# Patient Record
Sex: MALE | Race: WHITE | NOT HISPANIC OR LATINO | Employment: OTHER | URBAN - NONMETROPOLITAN AREA
[De-identification: names, ages, dates, MRNs, and addresses within clinical notes are randomized per-mention and may not be internally consistent; named-entity substitution may affect disease eponyms.]

---

## 2017-01-03 ENCOUNTER — OFFICE VISIT (OUTPATIENT)
Dept: CARDIAC SURGERY | Facility: CLINIC | Age: 67
End: 2017-01-03

## 2017-01-03 VITALS
SYSTOLIC BLOOD PRESSURE: 148 MMHG | WEIGHT: 234 LBS | OXYGEN SATURATION: 94 % | HEART RATE: 77 BPM | DIASTOLIC BLOOD PRESSURE: 64 MMHG | HEIGHT: 71 IN | TEMPERATURE: 97.1 F | BODY MASS INDEX: 32.76 KG/M2

## 2017-01-03 DIAGNOSIS — G62.9 NEUROPATHY: ICD-10-CM

## 2017-01-03 DIAGNOSIS — I73.9 PVD (PERIPHERAL VASCULAR DISEASE) (HCC): Primary | ICD-10-CM

## 2017-01-03 PROBLEM — I10 ESSENTIAL HYPERTENSION: Status: ACTIVE | Noted: 2017-01-03

## 2017-01-03 PROBLEM — F32.9 REACTIVE DEPRESSION: Status: ACTIVE | Noted: 2017-01-03

## 2017-01-03 PROBLEM — E78.2 MIXED HYPERLIPIDEMIA: Status: ACTIVE | Noted: 2017-01-03

## 2017-01-03 PROCEDURE — 99213 OFFICE O/P EST LOW 20 MIN: CPT | Performed by: NURSE PRACTITIONER

## 2017-01-03 RX ORDER — GABAPENTIN 100 MG/1
100 CAPSULE ORAL NIGHTLY
Qty: 30 CAPSULE | Refills: 0 | Status: SHIPPED | OUTPATIENT
Start: 2017-01-03 | End: 2017-01-30 | Stop reason: DRUGHIGH

## 2017-01-03 RX ORDER — ESCITALOPRAM OXALATE 20 MG/1
10 TABLET ORAL DAILY
COMMUNITY

## 2017-01-03 NOTE — LETTER
January 3, 2017     LAURA Bain  [address] [facility] [address]    Patient: Conrado Cazares   YOB: 1950   Date of Visit: 1/3/2017       Dear LAURA Juarez:    Conrado Cazares was in my office today. Below are the relevant portions of my assessment and plan of care.      Independent Review of Radiographic Studies:    01/03/17   ASHWIN:  RIGHT .82  triphasic.  LEFT .70  triphasic.  1. PVD (peripheral vascular disease)  Moderate stable disease.  Continue medical management.  Conrado Cazares is to continue beta blocker, antiplatelet, and statin therapy.  ACE/ARB Lisinopril   Pletal  FU 6 mths unless increase symptoms  If signs and symptoms of ischemia should occur including but not limited to pale/blue discoloration of limb, increasing pain with ambulation or at rest, or a non-healing wound. Patient is to notify [facility] center for immediate evaluation.    2. Neuropathy  Add low dose gabapentin.  30 days only  To notify us if effective.    Detailed discussion regarding risks, benefits, and treatment plan.  Patient understands, agrees, and wishes to proceed with plan.                 If you have questions, please do not hesitate to call me. I look forward to following Conrado along with you.         Sincerely,        LAURA Floyd        CC: No Recipients

## 2017-01-03 NOTE — PATIENT INSTRUCTIONS
Vascular Test:  ASHWIN:  Moderate disease with triphasic waveforms (normal)   Continue aspirin and Pletal  Lopressor & Zestril     For neuropathy:  Add Neurontin or gabapentin at bedtime.  Inform of effectiveness  FU 6 mths unless increase symptoms  If signs and symptoms of ischemia should occur including but not limited to pale/blue discoloration of limb, increasing pain with ambulation or at rest, or a non-healing wound. Patient is to notify [facility] center for immediate evaluation.

## 2017-01-03 NOTE — PROGRESS NOTES
Subjective   Patient ID: Conrado Cazares is a 66 y.o. male is here today for follow-up for PVD.  CC:  Denies claudication.  CO feet burning &  leg numbness with sensation intact Denies any non healing sores or color changes.   PCP:  Rae Deras  History of Present Illness  66yr man with HTN, dyslipidemia, PVD, GERD.  smokes Ecig.  mild difficulty walking, calves cramp at 89yds, hips lock up, rest few minutes and goes on, progressive over years.  burning feet.   smokes vapor cigarette.  no new problems.    Lower extremity Arterial physiologic report (THG):  ASHWIN:  RIGHT .66 tri/biphasic. LEFT .56 tri/biphasic.  12/21/2015 ASHWIN:  RIGHT .80 triphasic.  LEFT .88 triphasic.  6/27/2016 ASHWIN:  RIGHT .88 triphasic.  LEFT .79 triphasic.  01/03/17   ASHWIN:  RIGHT .82  triphasic.  LEFT .70  triphasic.    The following portions of the patient's history were reviewed and updated as appropriate: allergies, current medications, past family history, past medical history, past social history, past surgical history and problem list.    Current Outpatient Prescriptions:   •  amLODIPine (NORVASC) 10 MG tablet, Take 10 mg by mouth Daily., Disp: , Rfl:   •  aspirin 325 MG tablet, Take 325 mg by mouth Daily., Disp: , Rfl:   •  atorvastatin (LIPITOR) 20 MG tablet, Take 20 mg by mouth Every Night., Disp: , Rfl:   •  cilostazol (PLETAL) 100 MG tablet, TAKE ONE TABLET BY MOUTH TWICE DAILY, Disp: 60 tablet, Rfl: 5  •  escitalopram (LEXAPRO) 20 MG tablet, Take 20 mg by mouth Daily., Disp: , Rfl:   •  lisinopril (PRINIVIL,ZESTRIL) 20 MG tablet, Take 20 mg by mouth Daily., Disp: , Rfl:   •  metoprolol tartrate (LOPRESSOR) 25 MG tablet, Take 25 mg by mouth Daily., Disp: , Rfl:   •  omega-3 acid ethyl esters (LOVAZA) 1 G capsule, Take 2 g by mouth 2 (Two) Times a Day., Disp: , Rfl:   •  ranitidine (ZANTAC) 150 MG tablet, Take 150 mg by mouth Every Night., Disp: , Rfl:   •  gabapentin (NEURONTIN) 100 MG capsule, Take 1 capsule by  mouth Every Night., Disp: 30 capsule, Rfl: 0    Review of Systems   Constitution: Negative for fever, weakness and malaise/fatigue.   HENT: Negative for headaches, hoarse voice and nosebleeds.    Eyes: Negative for visual disturbance.   Respiratory: Negative for cough, hemoptysis and shortness of breath.    Hematologic/Lymphatic: Does not bruise/bleed easily.   Skin: Negative for color change and flushing.   Musculoskeletal: Positive for muscle weakness (hips and legs ) and myalgias. Negative for joint swelling.   Gastrointestinal: Negative for change in bowel habit, heartburn, hematemesis, melena and nausea.   Genitourinary: Negative for hematuria.   Neurological: Positive for numbness (feet ) and paresthesias (burning feet). Negative for brief paralysis, disturbances in coordination, light-headedness, loss of balance and sensory change.   Psychiatric/Behavioral: Positive for depression. Negative for altered mental status.        Objective   Physical Exam   Constitutional: He is oriented to person, place, and time. He appears well-nourished.   HENT:   Head: Normocephalic.   Eyes: Conjunctivae are normal. Pupils are equal, round, and reactive to light.   Neck: No JVD present. No tracheal deviation present.   Cardiovascular: Normal rate, regular rhythm, normal heart sounds and intact distal pulses.    Pulses:       Carotid pulses are 1+ on the right side with bruit, and 1+ on the left side.       Radial pulses are 2+ on the right side, and 2+ on the left side.        Dorsalis pedis pulses are 2+ on the right side, and 2+ on the left side.        Posterior tibial pulses are 2+ on the right side, and 2+ on the left side.   Triphasic waveforms    Pulmonary/Chest: Effort normal and breath sounds normal.   Musculoskeletal: He exhibits no edema or tenderness.   Neurological: He is alert and oriented to person, place, and time.   Skin: Skin is warm and dry. No erythema. No pallor.   LE:  W/d/p cap refil<3 sec no edema   Light pink  post toes  No paleness  Intact mobility and sensation      Nursing note and vitals reviewed.      Assessment/Plan   Independent Review of Radiographic Studies:    01/03/17   ASHWIN:  RIGHT .82  triphasic.  LEFT .70  triphasic.  1. PVD (peripheral vascular disease)  Moderate stable disease.  Continue medical management.  Conrado Cazares is to continue beta blocker, antiplatelet, and statin therapy.  ACE/ARB Lisinopril   Pletal  FU 6 mths unless increase symptoms  If signs and symptoms of ischemia should occur including but not limited to pale/blue discoloration of limb, increasing pain with ambulation or at rest, or a non-healing wound. Patient is to notify [facility] V[facility] center for immediate evaluation.    2. Neuropathy  Add low dose gabapentin.  30 days only  To notify us if effective.    Detailed discussion regarding risks, benefits, and treatment plan.  Patient understands, agrees, and wishes to proceed with plan.

## 2017-01-03 NOTE — MR AVS SNAPSHOT
Conrado Cazares   1/3/2017 1:20 PM   Office Visit    Dept Phone:  [phone]   Encounter #:  [id number]    Provider:  LAURA Qureshi   Department:  [facility] CARDIOTHORACIC AND VASCULAR SURGERY                Your Full Care Plan              Today's Medication Changes          These changes are accurate as of: 1/3/17  2:02 PM.  If you have any questions, ask your nurse or doctor.               New Medication(s)Ordered:     gabapentin 100 MG capsule   Commonly known as:  NEURONTIN   Take 1 capsule by mouth Every Night.   Started by:  LAURA Qureshi         Medication(s)that have changed:     escitalopram 20 MG tablet   Commonly known as:  LEXAPRO   Take 20 mg by mouth Daily.   What changed:  Another medication with the same name was removed. Continue taking this medication, and follow the directions you see here.   Changed by:  LAURA Qureshi            Where to Get Your Medications      These medications were sent to [address] [facility] [phone] [facility] [phone] [place] [address] [place] [address]     Phone:  [phone]     gabapentin 100 MG capsule                  Your Updated Medication List          This list is accurate as of: 1/3/17  2:02 PM.  Always use your most recent med list.                amLODIPine 10 MG tablet   Commonly known as:  NORVASC       aspirin 325 MG tablet       atorvastatin 20 MG tablet   Commonly known as:  LIPITOR       cilostazol 100 MG tablet   Commonly known as:  PLETAL   TAKE ONE TABLET BY MOUTH TWICE DAILY       escitalopram 20 MG tablet   Commonly known as:  LEXAPRO       gabapentin 100 MG capsule   Commonly known as:  NEURONTIN   Take 1 capsule by mouth Every Night.       lisinopril 20 MG tablet   Commonly known as:  PRINIVIL,ZESTRIL       LOVAZA 1 G capsule   Generic drug:  omega-3 acid ethyl esters       metoprolol tartrate 25 MG tablet   Commonly known as:   LOPRESSOR       raNITIdine 150 MG tablet   Commonly known as:  ZANTAC               We Performed the Following     Ankle Brachial Index       You Were Diagnosed With        Codes Comments    PVD (peripheral vascular disease)    -  Primary ICD-10-CM: I73.9  ICD-9-CM: 443.9     Neuropathy     ICD-10-CM: G62.9  ICD-9-CM: 355.9       Instructions    Vascular Test:  ASHWIN:  Moderate disease with triphasic waveforms (normal)   Continue aspirin and Pletal  Lopressor & Zestril     For neuropathy:  Add Neurontin or gabapentin at bedtime.  Inform of effectiveness  FU 6 mths unless increase symptoms  If signs and symptoms of ischemia should occur including but not limited to pale/blue discoloration of limb, increasing pain with ambulation or at rest, or a non-healing wound. Patient is to notify Heart and Vascular center for immediate evaluation.         Patient Instructions History      Upcoming Appointments     Visit Type Date Time Department    FOLLOW UP 1/3/2017  1:20 PM [facility]      [facility]     [facility] [employer] allows you to send messages to your doctor, view your test results, renew your prescriptions, schedule appointments, and more. To sign up, go to [employer] and click on the Sign Up Now link in the New User? box. Enter your [employer] Activation Code exactly as it appears below along with the last four digits of your Social Security Number and your Date of Birth () to complete the sign-up process. If you do not sign up before the expiration date, you must request a new code.    [employer] Activation Code: [email or web address]  Expires: 2017 12:59 PM    If you have questions, you can email [email or web address] or call [phone] to talk to our [employer] staff. Remember, [employer] is NOT to be used for urgent needs. For medical emergencies, dial 911.               Other Info from Your Visit           Allergies     No Known Allergies      Reason for Visit     Peripheral  "Vascular Disease 6 month f/u      Vital Signs     Blood Pressure Pulse Temperature Height Weight Oxygen Saturation    148/64 (BP Location: Left arm) 77 97.1 °F (36.2 °C) (Oral) 71\" (180.3 cm) 234 lb (106 kg) 94%    Body Mass Index Smoking Status                32.64 kg/m2 Current Every Day Smoker          Problems and Diagnoses Noted     High blood pressure    Mixed hyperlipidemia    Neuropathy    PVD (peripheral vascular disease)    Reactive depression        "

## 2017-01-13 DIAGNOSIS — I65.23 BILATERAL CAROTID ARTERY STENOSIS: Primary | ICD-10-CM

## 2017-01-30 RX ORDER — GABAPENTIN 100 MG
CAPSULE ORAL
Qty: 90 CAPSULE | Refills: 2 | Status: SHIPPED | OUTPATIENT
Start: 2017-01-30 | End: 2017-02-03 | Stop reason: SDUPTHER

## 2017-02-03 RX ORDER — GABAPENTIN 100 MG
CAPSULE ORAL
Qty: 90 CAPSULE | Refills: 2 | Status: SHIPPED | OUTPATIENT
Start: 2017-02-03 | End: 2017-05-04

## 2017-04-05 RX ORDER — CILOSTAZOL 100 MG/1
TABLET ORAL
Qty: 60 TABLET | Refills: 0 | Status: CANCELLED | OUTPATIENT
Start: 2017-04-05

## 2017-04-05 RX ORDER — CILOSTAZOL 100 MG/1
100 TABLET ORAL 2 TIMES DAILY
Qty: 60 TABLET | Refills: 5 | Status: SHIPPED | OUTPATIENT
Start: 2017-04-05 | End: 2017-11-01 | Stop reason: SDUPTHER

## 2017-05-11 RX ORDER — GABAPENTIN 100 MG/1
100 CAPSULE ORAL 2 TIMES DAILY
Qty: 90 CAPSULE | Refills: 2 | Status: SHIPPED | OUTPATIENT
Start: 2017-05-11 | End: 2018-05-11

## 2017-05-15 RX ORDER — GABAPENTIN 100 MG/1
CAPSULE ORAL
Qty: 90 CAPSULE | Refills: 0 | OUTPATIENT
Start: 2017-05-15

## 2017-07-05 DIAGNOSIS — I73.9 PVD (PERIPHERAL VASCULAR DISEASE) (HCC): Primary | ICD-10-CM

## 2017-07-06 ENCOUNTER — OFFICE VISIT (OUTPATIENT)
Dept: CARDIAC SURGERY | Facility: CLINIC | Age: 67
End: 2017-07-06

## 2017-07-06 VITALS
OXYGEN SATURATION: 98 % | HEART RATE: 55 BPM | WEIGHT: 231.2 LBS | HEIGHT: 71 IN | SYSTOLIC BLOOD PRESSURE: 140 MMHG | DIASTOLIC BLOOD PRESSURE: 65 MMHG | BODY MASS INDEX: 32.37 KG/M2 | TEMPERATURE: 96.9 F

## 2017-07-06 DIAGNOSIS — I73.9 PVD (PERIPHERAL VASCULAR DISEASE) (HCC): Primary | ICD-10-CM

## 2017-07-06 PROCEDURE — 99213 OFFICE O/P EST LOW 20 MIN: CPT | Performed by: NURSE PRACTITIONER

## 2017-07-06 RX ORDER — PRIMIDONE 250 MG/1
TABLET ORAL 2 TIMES DAILY
COMMUNITY
Start: 2017-07-01 | End: 2022-09-14

## 2017-07-06 RX ORDER — ROPINIROLE 1 MG/1
2 TABLET, FILM COATED ORAL NIGHTLY
COMMUNITY
Start: 2017-07-01 | End: 2022-09-14

## 2017-07-06 RX ORDER — CLONAZEPAM 1 MG/1
0.5 TABLET ORAL NIGHTLY PRN
Status: ON HOLD | COMMUNITY
Start: 2017-07-01 | End: 2020-02-04

## 2017-07-06 RX ORDER — PROPRANOLOL HYDROCHLORIDE 160 MG/1
160 CAPSULE, EXTENDED RELEASE ORAL DAILY
COMMUNITY
Start: 2017-07-01 | End: 2022-08-29

## 2017-07-06 RX ORDER — HYDROCHLOROTHIAZIDE 25 MG/1
TABLET ORAL
COMMUNITY
Start: 2017-07-03 | End: 2018-01-18 | Stop reason: ALTCHOICE

## 2017-07-06 RX ORDER — LISINOPRIL 5 MG/1
TABLET ORAL
COMMUNITY
Start: 2017-07-01 | End: 2018-01-18 | Stop reason: ALTCHOICE

## 2017-07-06 NOTE — PATIENT INSTRUCTIONS
Vascular Studies:  Stable Mild disease both legs with triphasic waveforms  RIGHT 0.86  Left 0.70  Medications that reduce vascular disease:  Aspirin Pletal Lipitor  Secondary medications:  Metoprolol and lisinopril  Gabapentin  If desire CVTS to manage will require every 90 day visit.  If desire neurologist, Dr Herrera may manage.   Repeat studies in 6 months unless symptoms occur  If signs and symptoms of ischemia should occur including but not limited to pale/blue discoloration of limb, increasing pain with ambulation or at rest, or a non-healing wound. Patient is to notify Heart and Vascular center for immediate evaluation.  Recommend vit D level be obtain

## 2017-07-08 LAB
BH CV LOWER ARTERIAL LEFT ABI RATIO: 0.7
BH CV LOWER ARTERIAL LEFT DORSALIS PEDIS SYS MAX: 102 MMHG
BH CV LOWER ARTERIAL LEFT POST TIBIAL SYS MAX: 103 MMHG
BH CV LOWER ARTERIAL RIGHT ABI RATIO: 0.86
BH CV LOWER ARTERIAL RIGHT DORSALIS PEDIS SYS MAX: 102 MMHG
BH CV LOWER ARTERIAL RIGHT POST TIBIAL SYS MAX: 127 MMHG
UPPER ARTERIAL LEFT ARM BRACHIAL SYS MAX: 145 MMHG
UPPER ARTERIAL RIGHT ARM BRACHIAL SYS MAX: 147 MMHG

## 2017-07-14 NOTE — PROGRESS NOTES
Subjective   Patient ID: Conrado Cazares is a 67 y.o. male is here today for follow-up for PVD.  CC:  Denies claudication.  CO feet burning &  leg numbness with sensation intact Denies any non healing sores or color changes.  CIFUENTES with 20 min ambulation.   PCP:  Rae Deras  History of Present Illness  67yr man with HTN, dyslipidemia, PVD, GERD.  smokes Ecig.  mild difficulty walking, calves cramp at 89yds, hips lock up, rest few minutes and goes on, progressive over years.  burning feet.   smokes vapor cigarette.  no new problems.    Lower extremity Arterial physiologic report (THG):  ASHWIN:  RIGHT .66 tri/biphasic. LEFT .56 tri/biphasic.  12/21/2015 ASHWIN:  RIGHT .80 triphasic.  LEFT .88 triphasic.  6/27/2016 ASHWIN:  RIGHT .88 triphasic.  LEFT .79 triphasic.  01/03/17   ASHWIN:  RIGHT .82  triphasic.  LEFT .70  Triphasic.  07/06/17  ASHWIN:  RIGHT .86  triphasic.  LEFT .70  Triphasic.    The following portions of the patient's history were reviewed and updated as appropriate: allergies, current medications, past family history, past medical history, past social history, past surgical history and problem list.    Current Outpatient Prescriptions:   •  amLODIPine (NORVASC) 10 MG tablet, Take 10 mg by mouth Daily., Disp: , Rfl:   •  aspirin 325 MG tablet, Take 325 mg by mouth Daily., Disp: , Rfl:   •  atorvastatin (LIPITOR) 20 MG tablet, Take 20 mg by mouth Every Night., Disp: , Rfl:   •  cilostazol (PLETAL) 100 MG tablet, Take 1 tablet by mouth 2 (Two) Times a Day., Disp: 60 tablet, Rfl: 5  •  clonazePAM (KlonoPIN) 1 MG tablet, , Disp: , Rfl:   •  escitalopram (LEXAPRO) 20 MG tablet, Take 20 mg by mouth Daily., Disp: , Rfl:   •  gabapentin (NEURONTIN) 100 MG capsule, Take 1 capsule by mouth 2 (Two) Times a Day. Take 1 capsule in am and 2 capsule at bedtime, Disp: 90 capsule, Rfl: 2  •  hydrochlorothiazide (HYDRODIURIL) 25 MG tablet, , Disp: , Rfl:   •  lisinopril (PRINIVIL,ZESTRIL) 5 MG tablet, , Disp: ,  Rfl:   •  metoprolol tartrate (LOPRESSOR) 25 MG tablet, Take 25 mg by mouth Daily., Disp: , Rfl:   •  omega-3 acid ethyl esters (LOVAZA) 1 G capsule, Take 2 g by mouth 2 (Two) Times a Day., Disp: , Rfl:   •  primidone (MYSOLINE) 250 MG tablet, , Disp: , Rfl:   •  propranolol LA (INDERAL LA) 120 MG 24 hr capsule, , Disp: , Rfl:   •  ranitidine (ZANTAC) 150 MG tablet, Take 150 mg by mouth Every Night., Disp: , Rfl:   •  rOPINIRole (REQUIP) 1 MG tablet, , Disp: , Rfl:     Review of Systems   Constitution: Negative for fever, weakness and malaise/fatigue.   HENT: Negative for headaches, hoarse voice and nosebleeds.    Eyes: Negative for visual disturbance.   Cardiovascular: Positive for dyspnea on exertion (after walking 20 minutes ).   Respiratory: Negative for cough, hemoptysis and shortness of breath.    Hematologic/Lymphatic: Does not bruise/bleed easily.   Skin: Negative for color change and flushing.   Musculoskeletal: Positive for muscle weakness (hips and legs ) and myalgias. Negative for joint swelling.   Gastrointestinal: Negative for change in bowel habit, heartburn, hematemesis, melena and nausea.   Genitourinary: Negative for hematuria.   Neurological: Positive for numbness (feet ) and paresthesias (burning feet). Negative for brief paralysis, disturbances in coordination, light-headedness, loss of balance and sensory change.   Psychiatric/Behavioral: Negative for altered mental status. Depression: with anger issues.         Objective   Physical Exam   Constitutional: He is oriented to person, place, and time. He appears well-nourished.   HENT:   Head: Normocephalic.   Eyes: Conjunctivae are normal. Pupils are equal, round, and reactive to light.   Neck: No JVD present. No tracheal deviation present.   Cardiovascular: Normal rate, regular rhythm, normal heart sounds and intact distal pulses.    Pulses:       Carotid pulses are 1+ on the right side with bruit, and 1+ on the left side.       Radial pulses are  2+ on the right side, and 2+ on the left side.        Dorsalis pedis pulses are 2+ on the right side, and 2+ on the left side.        Posterior tibial pulses are 2+ on the right side, and 2+ on the left side.   Triphasic waveforms    Pulmonary/Chest: Effort normal and breath sounds normal.   Musculoskeletal: He exhibits edema (dependent). He exhibits no tenderness.       Neurological Sensory Findings -  Unaltered sharp/dull right ankle/foot discrimination and unaltered sharp/dull left ankle/foot discrimination.    Vascular Status -  His exam exhibits right foot vasculature normal and right foot edema. His exam exhibits left foot edema. His exam exhibits left foot vasculature abnormal (mild to moderate reduction ).   Skin Integrity  -  His right foot skin is intact.     Conrado 's left foot skin is intact. .  Neurological: He is alert and oriented to person, place, and time.   Skin: Skin is warm and dry. No erythema. No pallor.   LE:  W/d/p cap refil<3 sec no edema  Light pink  post toes  No paleness Warm  Intact mobility and sensation      Nursing note and vitals reviewed.      Assessment/Plan   Independent Review of Radiographic Studies:    07/06/17  ASHWIN:  RIGHT .86  triphasic.  LEFT .70  Triphasic.  1. PVD (peripheral vascular disease)  Moderate stable disease.  Continue medical management.  Conrado Cazares is to continue beta blocker, antiplatelet, and statin therapy.  ACE/ARB Lisinopril   Pletal  FU 6 mths unless increase symptoms  If signs and symptoms of ischemia should occur including but not limited to pale/blue discoloration of limb, increasing pain with ambulation or at rest, or a non-healing wound. Patient is to notify [facility] V[facility] center for immediate evaluation.    2. Neuropathy  Add low dose gabapentin, is effective.  Now managed by neurologist, Dr Herrera.     Detailed discussion regarding risks, benefits, and treatment plan.  Patient understands, agrees, and wishes to proceed with plan.

## 2017-10-31 RX ORDER — CILOSTAZOL 100 MG/1
TABLET ORAL
Qty: 60 TABLET | Refills: 5 | Status: CANCELLED | OUTPATIENT
Start: 2017-10-31

## 2017-11-01 RX ORDER — CILOSTAZOL 100 MG/1
100 TABLET ORAL 2 TIMES DAILY
Qty: 60 TABLET | Refills: 5 | Status: SHIPPED | OUTPATIENT
Start: 2017-11-01 | End: 2018-05-04 | Stop reason: SDUPTHER

## 2018-01-18 ENCOUNTER — OFFICE VISIT (OUTPATIENT)
Dept: CARDIAC SURGERY | Facility: CLINIC | Age: 68
End: 2018-01-18

## 2018-01-18 VITALS
HEIGHT: 71 IN | HEART RATE: 55 BPM | WEIGHT: 231.4 LBS | OXYGEN SATURATION: 99 % | SYSTOLIC BLOOD PRESSURE: 140 MMHG | TEMPERATURE: 96.7 F | DIASTOLIC BLOOD PRESSURE: 74 MMHG | BODY MASS INDEX: 32.4 KG/M2

## 2018-01-18 DIAGNOSIS — I73.9 PVD (PERIPHERAL VASCULAR DISEASE) (HCC): Primary | ICD-10-CM

## 2018-01-18 PROCEDURE — 99213 OFFICE O/P EST LOW 20 MIN: CPT | Performed by: NURSE PRACTITIONER

## 2018-01-18 NOTE — PATIENT INSTRUCTIONS
Vascular Tests:  Triphasic or normal resting blood flow to both feet  Venous congestion and swelling of both feet.  Sheeba boots till Sunday or Monday  Then Rebecca hose  Start with Large and progress to medium  If swelling and weight increases notify Dr Deras or our office  Repeat vascular tests in 6 mths unless  If signs and symptoms of ischemia should occur including but not limited to pale/blue discoloration of limb, increasing pain with ambulation or at rest, or a non-healing wound. Patient is to notify [facility] and V[facility] center for immediate evaluation.  Recommend continue medical management with antiplatelet therapy, and statin therapy.  Secondary ACE/BB  Vitamin D has been shown to promote healthy lining of arteries.  Check level and replace as needed.   Recommend healthy life style:   Continue not smoking cigarettes. Walking total of 45 minutes per day in minimum of 15 minute intervals   Avoid  Going  barefoot.  Non perfumed cream for for dry skin  REBECCA Hose Large or medium   Elevate legs 4 x a day 10 minutes at a time Ankle bone higher than hip bone.  Do toe heel exercises with elevation

## 2018-01-18 NOTE — PROGRESS NOTES
Subjective   Patient ID: Conrado Cazares is a 67 y.o. male is here today for follow-up for PVD.  CC:  Denies claudication.  CO feet burning &  leg numbness with sensation intact Denies any non healing sores or color changes.  Having swelling of lower legs and feet.  Has had weight gain over holidays, did increase food intake.  PCP:  Taihr Johnson  [place]  History of Present Illness  67yr man with HTN, dyslipidemia, PVD, GERD.  smokes Ecig.  mild difficulty walking, calves cramp at 89yds, hips lock up, rest few minutes and goes on, progressive over years.  burning feet.   smokes vapor cigarette.  no new problems except leg swelling has increased.     Lower extremity Arterial physiologic report (THG):  ASHWIN:  RIGHT .66 tri/biphasic. LEFT .56 tri/biphasic.  12/21/2015 ASHWIN:  RIGHT .80 triphasic.  LEFT .88 triphasic.  6/27/2016 ASHWIN:  RIGHT .88 triphasic.  LEFT .79 triphasic.  01/03/17   ASHWIN:  RIGHT .82  triphasic.  LEFT .70  Triphasic.  07/06/17  ASHWIN:  RIGHT .86  triphasic.  LEFT .70  Triphasic.  01/18/18   ASHWIN:  RIGHT 1.01  Triphasic.  LEFT .88  Triphasic.      The following portions of the patient's history were reviewed and updated as appropriate: allergies, current medications, past family history, past medical history, past social history, past surgical history and problem list.  See HPI  Allergies   Allergen Reactions   • Dopamine Dizziness     Dropped BP            Current Outpatient Prescriptions:   •  amLODIPine (NORVASC) 10 MG tablet, Take 10 mg by mouth Daily., Disp: , Rfl:   •  aspirin 325 MG tablet, Take 325 mg by mouth Daily., Disp: , Rfl:   •  atorvastatin (LIPITOR) 20 MG tablet, Take 20 mg by mouth Every Night., Disp: , Rfl:   •  cilostazol (PLETAL) 100 MG tablet, Take 1 tablet by mouth 2 (Two) Times a Day., Disp: 60 tablet, Rfl: 5  •  clonazePAM (KlonoPIN) 1 MG tablet, , Disp: , Rfl:   •  escitalopram (LEXAPRO) 20 MG tablet, Take 20 mg by mouth Daily., Disp: , Rfl:   •  gabapentin  (NEURONTIN) 100 MG capsule, Take 1 capsule by mouth 2 (Two) Times a Day. Take 1 capsule in am and 2 capsule at bedtime, Disp: 90 capsule, Rfl: 2  •  omega-3 acid ethyl esters (LOVAZA) 1 G capsule, Take 2 g by mouth 2 (Two) Times a Day., Disp: , Rfl:   •  primidone (MYSOLINE) 250 MG tablet, Take  by mouth Every Night., Disp: , Rfl:   •  propranolol LA (INDERAL LA) 120 MG 24 hr capsule, 120 mg Daily., Disp: , Rfl:   •  ranitidine (ZANTAC) 150 MG tablet, Take 150 mg by mouth Every Night., Disp: , Rfl:   •  metoprolol tartrate (LOPRESSOR) 25 MG tablet, Take 25 mg by mouth Daily., Disp: , Rfl:   •  rOPINIRole (REQUIP) 1 MG tablet, Take 2 mg by mouth Every Night., Disp: , Rfl:     Review of Systems   Constitution: Positive for weight gain. Negative for fever, weakness and malaise/fatigue.   HENT: Negative for hoarse voice and nosebleeds.    Eyes: Negative for visual disturbance.   Cardiovascular: Negative for dyspnea on exertion.   Respiratory: Negative for cough, hemoptysis and shortness of breath.    Hematologic/Lymphatic: Does not bruise/bleed easily.   Skin: Negative for color change and flushing.   Musculoskeletal: Positive for muscle weakness and myalgias. Negative for joint swelling.   Gastrointestinal: Negative for change in bowel habit, heartburn, hematemesis, melena and nausea.   Genitourinary: Negative for hematuria.   Neurological: Positive for numbness (feet ) and paresthesias (burning feet). Negative for brief paralysis, disturbances in coordination, headaches, light-headedness, loss of balance and sensory change.   Psychiatric/Behavioral: Negative for altered mental status. Depression: with anger issues.    Allergic/Immunologic: Negative for hives.        Objective   Physical Exam   Constitutional: He is oriented to person, place, and time. He appears well-nourished.   HENT:   Head: Normocephalic.   Eyes: Conjunctivae are normal. Pupils are equal, round, and reactive to light.   Neck: No JVD present. No  tracheal deviation present.   Cardiovascular: Normal rate, regular rhythm, normal heart sounds and intact distal pulses.    Pulses:       Carotid pulses are 1+ on the right side with bruit, and 1+ on the left side.       Radial pulses are 2+ on the right side, and 2+ on the left side.        Dorsalis pedis pulses are 2+ on the right side, and 2+ on the left side.        Posterior tibial pulses are 2+ on the right side, and 2+ on the left side.   Triphasic waveforms    Pulmonary/Chest: Effort normal and breath sounds normal.   Abdominal: Soft. Bowel sounds are normal.   Musculoskeletal: He exhibits edema (dependent). He exhibits no tenderness.       Neurological Sensory Findings -  Unaltered sharp/dull right ankle/foot discrimination and unaltered sharp/dull left ankle/foot discrimination.    Vascular Status -  His exam exhibits right foot vasculature normal and right foot edema. His exam exhibits left foot edema. His exam exhibits left foot vasculature abnormal.   Skin Integrity  -  His right foot skin is intact.     Conrado 's left foot skin is intact. .  Neurological: He is alert and oriented to person, place, and time.   Skin: Skin is warm and dry. No erythema. No pallor.   LE:  W/d/p cap refil<3 sec no edema  Light pink anterior toes, mild cyanosis posterior toes   No paleness Warm  Intact mobility and sensation to moderate touch.  Does not feel tissue.      Psychiatric: His behavior is normal.   Nursing note and vitals reviewed.      Assessment/Plan   Independent Review of Radiographic Studies:    01/18/18   ASHWIN:  RIGHT 1.01  Triphasic.  LEFT .88  Triphasic.  1. PVD (peripheral vascular disease)  Stable mild  disease.  Continue medical management.  Conrado Cazares is to continue beta blocker, antiplatelet, and statin therapy.  ACE/ARB Lisinopril   Pletal  Increase walking  Not smoking  Consider reduction in vapor   FU 6 mths unless increase symptoms  If signs and symptoms of ischemia should occur including but  not limited to pale/blue discoloration of limb, increasing pain with ambulation or at rest, or a non-healing wound. Patient is to notify [facility] for immediate evaluation.    2.  Dependent Edema  Venous disease, weight gain, and norvasc therapy contributes.  Unna Boots till Sunday or Monday then REBECCA hose  Start with Large and progress to medium  If swelling and weight increases notify Dr Deras or our office  Elevate legs 4 x a day 10 minutes at a time Ankle bone higher than hip bone.  Do toe heel exercises with elevation      Detailed discussion regarding risks, benefits, and treatment plan.  Patient understands, agrees, and wishes to proceed with plan.

## 2018-05-07 RX ORDER — CILOSTAZOL 100 MG/1
TABLET ORAL
Qty: 60 TABLET | Refills: 5 | Status: SHIPPED | OUTPATIENT
Start: 2018-05-07 | End: 2022-05-13

## 2018-07-19 ENCOUNTER — OFFICE VISIT (OUTPATIENT)
Dept: CARDIAC SURGERY | Facility: CLINIC | Age: 68
End: 2018-07-19

## 2018-07-19 VITALS
TEMPERATURE: 98.5 F | WEIGHT: 231 LBS | DIASTOLIC BLOOD PRESSURE: 70 MMHG | OXYGEN SATURATION: 99 % | BODY MASS INDEX: 32.34 KG/M2 | HEART RATE: 53 BPM | SYSTOLIC BLOOD PRESSURE: 162 MMHG | HEIGHT: 71 IN

## 2018-07-19 DIAGNOSIS — I73.9 PVD (PERIPHERAL VASCULAR DISEASE) (HCC): Primary | ICD-10-CM

## 2018-07-19 PROCEDURE — 99213 OFFICE O/P EST LOW 20 MIN: CPT | Performed by: NURSE PRACTITIONER

## 2018-07-19 RX ORDER — LISINOPRIL 10 MG/1
20 TABLET ORAL DAILY
Status: ON HOLD | COMMUNITY
End: 2020-02-04

## 2018-07-19 RX ORDER — GABAPENTIN 100 MG/1
100 CAPSULE ORAL 3 TIMES DAILY
COMMUNITY

## 2018-07-19 NOTE — PATIENT INSTRUCTIONS
Vascular Tests:  Normal blood flow to R leg foot.  Minimal reduction to Left toes  Medications that reduce vascular disease:  Vit D, aspirin, Lipitor, pletal  Lisinopril and Inderal  Continue smoking reduction with vapor cigarettes.  All nicotine products can inflame arteries.  Edema  Elevate legs   Walk 3-4 times a day 10-15 minutes per day  Early morning exercise helps weight loss   Heart healthy diet  Low salt  Consistent carbohydrate   Repeat 6 mths unless  If signs and symptoms of ischemia should occur including but not limited to pale/blue discoloration of limb, increasing pain with ambulation or at rest, or a non-healing wound. Patient is to notify [facility] center for immediate evaluation.   Elevate legs 4 x a day 10 minutes at a time Ankle bone higher than hip bone.  Do toe heel exercises with elevation

## 2018-07-19 NOTE — PROGRESS NOTES
Subjective   Patient ID: Conrado Cazares is a 68 y.o. male is here today for follow-up for PVD.  CC:  Denies claudication.  CO feet burning &  leg numbness with sensation intact, mild improvement Denies any non healing sores or color changes.  Swelling of lower legs and feet.      PCP:  Tahir Deras  History of Present Illness  68yr man with HTN, dyslipidemia, PVD, GERD.  smokes Ecig.  mild difficulty walking, calves cramp at 89yds, hips lock up, rest few minutes and goes on, progressive over years.  burning feet.   smokes vapor cigarette.  Leg swelling has increased.  Waling without cane.   Thinks anxiety depression bettered controlled.      Lower extremity Arterial physiologic report (THG):  ASHWIN:  RIGHT .66 tri/biphasic. LEFT .56 tri/biphasic.  12/21/2015 ASHWIN:  RIGHT .80 triphasic.  LEFT .88 triphasic.  6/27/2016 ASHWIN:  RIGHT .88 triphasic.  LEFT .79 triphasic.  01/03/17   ASHWIN:  RIGHT .82  triphasic.  LEFT .70  Triphasic.  07/06/17  ASHWIN:  RIGHT .86  triphasic.  LEFT .70  Triphasic.  01/18/18   ASHWIN:  RIGHT 1.01  Triphasic.  LEFT .88  Triphasic.  07/19/18   ASHWIN:  RIGHT 0.96  Triphasic.  LEFT .79  Triphasic.      The following portions of the patient's history were reviewed and updated as appropriate: allergies, current medications, past family history, past medical history, past social history, past surgical history and problem list.  See HPI  Allergies   Allergen Reactions   • Dopamine Dizziness     Dropped BP            Current Outpatient Prescriptions:   •  aspirin 325 MG tablet, Take 325 mg by mouth Daily., Disp: , Rfl:   •  atorvastatin (LIPITOR) 20 MG tablet, Take 20 mg by mouth Every Night., Disp: , Rfl:   •  Cholecalciferol (VITAMIN D3) 1000 units capsule, Take  by mouth., Disp: , Rfl:   •  cilostazol (PLETAL) 100 MG tablet, TAKE ONE TABLET BY MOUTH TWICE DAILY, Disp: 60 tablet, Rfl: 5  •  clonazePAM (KlonoPIN) 1 MG tablet, , Disp: , Rfl:   •  escitalopram (LEXAPRO) 20 MG tablet, Take  20 mg by mouth Daily., Disp: , Rfl:   •  gabapentin (NEURONTIN) 100 MG capsule, Take 100 mg by mouth 3 (Three) Times a Day., Disp: , Rfl:   •  lisinopril (PRINIVIL,ZESTRIL) 10 MG tablet, Take 10 mg by mouth Daily., Disp: , Rfl:   •  omega-3 acid ethyl esters (LOVAZA) 1 G capsule, Take 2 g by mouth 2 (Two) Times a Day., Disp: , Rfl:   •  primidone (MYSOLINE) 250 MG tablet, Take  by mouth Every Night. 1/2 tab. AM 1 tab. PM, Disp: , Rfl:   •  propranolol LA (INDERAL LA) 120 MG 24 hr capsule, 120 mg Daily., Disp: , Rfl:   •  ranitidine (ZANTAC) 150 MG tablet, Take 150 mg by mouth Every Night., Disp: , Rfl:   •  rOPINIRole (REQUIP) 1 MG tablet, Take 2 mg by mouth Every Night., Disp: , Rfl:     Review of Systems   Constitution: Negative for fever, weakness, malaise/fatigue and weight gain.   HENT: Negative for hoarse voice and nosebleeds.    Eyes: Negative for visual disturbance.   Cardiovascular: Positive for leg swelling. Negative for chest pain, claudication, cyanosis and dyspnea on exertion.        LE:  N Open sores  N color changes  N coldness.           Y numbness or paresthesias     Respiratory: Negative for cough, hemoptysis and shortness of breath.    Hematologic/Lymphatic: Does not bruise/bleed easily.   Skin: Negative for color change and flushing.   Musculoskeletal: Positive for muscle weakness and myalgias. Negative for joint swelling.   Gastrointestinal: Negative for abdominal pain, anorexia, change in bowel habit, heartburn, hematemesis, melena and nausea.   Genitourinary: Negative for hematuria.   Neurological: Positive for numbness (feet ) and paresthesias (burning feet). Negative for brief paralysis, disturbances in coordination, headaches, light-headedness, loss of balance, sensory change and tremors.   Psychiatric/Behavioral: Negative for altered mental status. Depression: with anger issues.    Allergic/Immunologic: Negative for hives.        Objective   Physical Exam   Constitutional: He is oriented  to person, place, and time. He appears well-nourished.   Body mass index is 32.22 kg/m².     HENT:   Head: Normocephalic.   Mouth/Throat: Oropharynx is clear and moist.   Eyes: Pupils are equal, round, and reactive to light. Conjunctivae are normal.   Neck: Neck supple. No JVD present. No tracheal deviation present.   Cardiovascular: Normal rate, regular rhythm, normal heart sounds and intact distal pulses.    Pulses:       Carotid pulses are 1+ on the right side, and 1+ on the left side.       Radial pulses are 2+ on the right side, and 2+ on the left side.        Dorsalis pedis pulses are 2+ on the right side, and 2+ on the left side.        Posterior tibial pulses are 2+ on the right side, and 2+ on the left side.   Triphasic waveforms    Pulmonary/Chest: Effort normal and breath sounds normal. No respiratory distress.   Abdominal: Soft. Bowel sounds are normal. He exhibits no distension.   Musculoskeletal: He exhibits edema (dependent). He exhibits no tenderness.       Neurological Sensory Findings -  Altered sharp/dull right ankle/foot discrimination and altered sharp/dull left ankle/foot discrimination.  Vascular Status -  His right foot exhibits abnormal foot edema. His right foot exhibits normal foot vasculature . His left foot exhibits abnormal foot edema. His left foot exhibits normal foot vasculature .  Skin Integrity  -  His right foot skin is intact.His left foot skin is intact..  Neurological: He is alert and oriented to person, place, and time. No cranial nerve deficit.   Skin: Skin is warm and dry. Capillary refill takes less than 2 seconds. No erythema. No pallor.   LE:  W/d/p cap refil<3 sec no edema  Light pink anterior toes, mild cyanosis posterior toes   No paleness Warm  Intact mobility and sensation to moderate touch.  Does not feel light touch      Psychiatric: His behavior is normal.   Nursing note and vitals reviewed.    Vitals:    07/19/18 1043   BP: 162/70   Pulse: 53   Temp: 98.5 °F  (36.9 °C)   SpO2: 99%     1    07/19/18  1043   Weight: 105 kg (231 lb)         Assessment/Plan   Independent Review of Radiographic Studies:    07/19/18   ASHWIN:  RIGHT 0.96  Triphasic.  LEFT .79  Triphasic.  1. PVD (peripheral vascular disease)  Stable mild  disease.  Continue medical management.  Conrado Cazares is to continue beta blocker, antiplatelet, and statin therapy.  ACE/ARB Lisinopril   Pletal  Increase walking  Not smoking  Consider reduction in vapor  All nicotine products can inflame arteries.  I advised Conrado of the risks of continuing to use tobacco, and I provided him with tobacco cessation educational materials in the After Visit Summary.   During this visit, I spent 2 minutes counseling the patient regarding tobacco cessation.    FU 6 mths unless increase symptoms  If signs and symptoms of ischemia should occur including but not limited to pale/blue discoloration of limb, increasing pain with ambulation or at rest, or a non-healing wound. Patient is to notify [facility] center for immediate evaluation.    2.  Dependent Edema  Unna boots and hose ineffective he reports.   Seeing Dr Deras today regarding edema    Elevate legs 4 x a day 10 minutes at a time Ankle bone higher than hip bone.  Do toe heel exercises with elevation    3.  Obesity Class I  Patient's Body mass index is 32.22 kg/m². BMI is above normal parameters. Recommendations include: nutrition counseling   Heart healthy diet  Low salt  Consistent carbohydrate .        Detailed discussion regarding risks, benefits, and treatment plan.  Patient understands, agrees, and wishes to proceed with plan.

## 2019-01-24 ENCOUNTER — OFFICE VISIT (OUTPATIENT)
Dept: CARDIAC SURGERY | Facility: CLINIC | Age: 69
End: 2019-01-24

## 2019-01-24 VITALS
HEIGHT: 71 IN | HEART RATE: 54 BPM | SYSTOLIC BLOOD PRESSURE: 135 MMHG | TEMPERATURE: 97.4 F | DIASTOLIC BLOOD PRESSURE: 80 MMHG | WEIGHT: 237 LBS | OXYGEN SATURATION: 98 % | BODY MASS INDEX: 33.18 KG/M2

## 2019-01-24 DIAGNOSIS — I73.9 PVD (PERIPHERAL VASCULAR DISEASE) (HCC): Primary | ICD-10-CM

## 2019-01-24 DIAGNOSIS — E78.2 MIXED HYPERLIPIDEMIA: ICD-10-CM

## 2019-01-24 PROCEDURE — 99214 OFFICE O/P EST MOD 30 MIN: CPT | Performed by: NURSE PRACTITIONER

## 2019-01-24 RX ORDER — BUMETANIDE 2 MG/1
2 TABLET ORAL DAILY
COMMUNITY
End: 2021-03-19

## 2019-01-24 NOTE — PATIENT INSTRUCTIONS
The results of your vascular studies of your right leg shows mild disease with good  circulation, in the left leg shows moderatedisease with fair circulation.  Continue aspirin, atorvastatin, Vit D3, cilostezal, lisinopril, and pronanolol for vascular disease.  If signs and symptoms of ischemia should occur including but not limited to pale/blue discoloration of limb, increasing pain with ambulation or at rest, or a non-healing wound. Patient is to notify [facility] center for immediate evaluation.

## 2019-01-28 NOTE — PROGRESS NOTES
CVTS Office Progress Note     Subjective   Patient ID: Conrado Cazares is a 68 y.o. male is here today for follow-up PVD.     Chief Complaint:    Chief Complaint   Patient presents with   • Peripheral Vascular Disease     6 month follow up        PCP:  Yohana Saenz APRN  Cardiology:  Dr. Menon  Nephrology:  Dr. Acosta     History of Present Illness  Mr. Cazares is a pleasant 68 year old male with a history of PVD, HTN, HLD, Neuropathy, depression, Vitamin D deficiency, GERD, and Tobacco Depedendence Syndrome.  He currently uses vaporizer in place of cigarettes.  He established with [facility] for lifestyle limiting claudication in 2015.  Initial ASHWIN showed 0.66 tri/bi of the RIGHT, and 0.56 tri/bi of the LEFT.  Has since, imroved considerably with medical management of vascular disease with the addition of pletal.  He currently does not report walking distance limitations or claudication at this time.  He has carotid bruit on exam but states he has had carotid duplex in [place] with minimal stenosis.      Peripheral Vascular Studies  06/2014 ASHWIN: 0.66 tri/bi of the RIGHT, and 0.56 tri/bi of the LEFT  12/21/2015 ASHWIN:  RIGHT .80 triphasic.  LEFT .88 triphasic.  6/27/2016 ASHWIN:  RIGHT .88 triphasic.  LEFT .79 triphasic.  01/03/17   ASHWIN:  RIGHT .82  triphasic.  LEFT .70  Triphasic.  07/06/17  ASHWIN:  RIGHT .86  triphasic.  LEFT .70  Triphasic.  01/18/18   ASHWIN:  RIGHT 1.01  Triphasic.  LEFT .88  Triphasic.  07/19/18   ASHWIN:  RIGHT 0.96  Triphasic.  LEFT .79  Triphasic.  01/24/19 ASHWIN: RIGHT 1.08 Triphasic. LEFT 0.78 Biphasic from femoral to distal       The following portions of the patient's history were reviewed and updated as appropriate: allergies, current medications, past family history, past medical history, past social history, past surgical history and problem list.  Recent images independently reviewed.  Available laboratory values reviewed.      Past Medical History:   Diagnosis Date   • Dyslipidemia     • Essential (primary) hypertension    • Essential hypertension    • Hyperlipidemia    • Nicotine dependence      other tobacco product, uncomplicated - vapor cig      • Other atherosclerosis of native arteries of extremities, bilateral legs (CMS/HCC)     with intermittent claudication   • Tobacco dependence syndrome      History reviewed. No pertinent surgical history.  Family History   Problem Relation Age of Onset   • Heart disease Other      Social History     Tobacco Use   • Smoking status: Current Every Day Smoker     Types: Electronic Cigarette   • Smokeless tobacco: Never Used   Substance Use Topics   • Alcohol use: No   • Drug use: No       ALLERGIES:   Dopamine    MEDICATIONS:      Current Outpatient Medications:   •  aspirin 325 MG tablet, Take 325 mg by mouth Daily., Disp: , Rfl:   •  atorvastatin (LIPITOR) 20 MG tablet, Take 20 mg by mouth Every Night., Disp: , Rfl:   •  bumetanide (BUMEX) 2 MG tablet, Take 2 mg by mouth Daily., Disp: , Rfl:   •  Cholecalciferol (VITAMIN D3) 1000 units capsule, Take  by mouth., Disp: , Rfl:   •  cilostazol (PLETAL) 100 MG tablet, TAKE ONE TABLET BY MOUTH TWICE DAILY, Disp: 60 tablet, Rfl: 5  •  clonazePAM (KlonoPIN) 1 MG tablet, , Disp: , Rfl:   •  escitalopram (LEXAPRO) 20 MG tablet, Take 20 mg by mouth Daily., Disp: , Rfl:   •  gabapentin (NEURONTIN) 100 MG capsule, Take 100 mg by mouth 3 (Three) Times a Day., Disp: , Rfl:   •  lisinopril (PRINIVIL,ZESTRIL) 10 MG tablet, Take 10 mg by mouth Daily., Disp: , Rfl:   •  omega-3 acid ethyl esters (LOVAZA) 1 G capsule, Take 2 g by mouth 2 (Two) Times a Day., Disp: , Rfl:   •  primidone (MYSOLINE) 250 MG tablet, Take  by mouth Every Night. 1/2 tab. AM 1 tab. PM, Disp: , Rfl:   •  propranolol LA (INDERAL LA) 120 MG 24 hr capsule, 120 mg Daily., Disp: , Rfl:   •  ranitidine (ZANTAC) 150 MG tablet, Take 150 mg by mouth Every Night., Disp: , Rfl:   •  rOPINIRole (REQUIP) 1 MG tablet, Take 2 mg by mouth Every Night., Disp: ,  Rfl:     Review of Systems   Constitution: Negative for chills, decreased appetite, fever, weakness and weight loss.   HENT: Negative for congestion, nosebleeds and sore throat.    Eyes: Negative for blurred vision, visual disturbance and visual halos.   Cardiovascular: Positive for leg swelling. Negative for chest pain, claudication and dyspnea on exertion.   Respiratory: Negative for cough, shortness of breath, sputum production and wheezing.    Endocrine: Negative for cold intolerance and polyuria.   Hematologic/Lymphatic: Negative for bleeding problem. Does not bruise/bleed easily.   Skin: Positive for unusual hair distribution. Negative for flushing and nail changes.   Musculoskeletal: Positive for arthritis and joint pain. Negative for back pain.   Gastrointestinal: Negative for bloating, abdominal pain, hematemesis, melena, nausea and vomiting.   Genitourinary: Negative for flank pain and hematuria.   Neurological: Negative for brief paralysis, difficulty with concentration, focal weakness, light-headedness, loss of balance, numbness and paresthesias.        No amaurosis fugax, TIA, or CVA.     Psychiatric/Behavioral: Negative for altered mental status, depression, substance abuse and suicidal ideas.   Allergic/Immunologic: Negative for hives and persistent infections.     Vitals:    01/24/19 1402   BP: 135/80   Pulse: 54   Temp: 97.4 °F (36.3 °C)   SpO2: 98%      Objective      Body mass index is 33.05 kg/m².  Physical Exam   Constitutional: He is oriented to person, place, and time. He appears well-developed and well-nourished.   HENT:   Head: Normocephalic and atraumatic.   Mouth/Throat: Oropharynx is clear and moist.   Eyes: Conjunctivae and EOM are normal. Pupils are equal, round, and reactive to light.   Neck: Normal range of motion. Neck supple. No JVD present.   Cardiovascular: Normal rate, regular rhythm, normal heart sounds and intact distal pulses.   Pulses:       Carotid pulses are 1+ on the right  side with bruit, and 1+ on the left side.       Dorsalis pedis pulses are 1+ on the right side, and 1+ on the left side.        Posterior tibial pulses are 1+ on the right side, and 1+ on the left side.   Venous stasis   Pulmonary/Chest: Effort normal and breath sounds normal. No stridor. No respiratory distress.   Abdominal: Soft. Bowel sounds are normal.   Musculoskeletal: Normal range of motion. He exhibits no edema or tenderness.   Lymphadenopathy:     He has no cervical adenopathy.   Neurological: He is alert and oriented to person, place, and time. No cranial nerve deficit. Coordination normal.   Skin: Skin is warm and dry. Capillary refill takes less than 2 seconds.   Venous staining present   Psychiatric: He has a normal mood and affect. His behavior is normal. Judgment normal.   Nursing note and vitals reviewed.        Assessment & Plan     Independent Review of Studies  01/24/19 ASHWIN: RIGHT 1.08 Triphasic. LEFT 0.78 Biphasic from femoral to distal    Peripheral Vascular Disease  Results of todays ASHWIN shows moderate disease of the left leg and mild of the right.  Currently, patient does not complain of claudication, and can ambulate unhindered.  Would recommend repeat ASHWIN in 12 months for surveillance of PVD.  Medical management includes ACE/ARB, BB, STATIN, Anti-Platelet, and VIT D if known deficiency.  We have discussed the benefit of improving conditioning as a means to improve walking distance and claudication symptoms.  Lifestyle changes as below.    Venous Stasis  Would recommend compression stockings, weight reduction, and routine ambulation/exercise    Lifestyle Modifications  Recommended lifestyle modifications to reduce the progression of vascular disease include increasing moderate activity to 150 minutes per week, avoidance of smoking, optimizing blood pressure control, strict control of diabetes, and management of hyperlipidemia.  Choose a diet that emphasizes intake of vegetables, fruits, and  whole grains; includes low-fat dairy products, poultry, fish, legumes, nontropical vegetable oils, and nuts; and limits intake of sweets, sugar-sweetened beverages, and red meats.  Limit alcohol intake to one drink per day in females and 2 drinks per day in men.      Class 1 Obesity  We have discussed the benefits of weight loss as it relates to long term morbidity and disease prevention.  Interventions discussed included self-monitoring of caloric intake, increasing physical activity/exercise, goal setting, stimulus control, consultation with dietitian, and non-food rewards.          Detailed discussion regarding risks, benefits, and treatment plan. Images independently reviewed. Patient understands, agrees, and wishes to proceed with plan.

## 2019-08-01 ENCOUNTER — OFFICE VISIT (OUTPATIENT)
Dept: CARDIAC SURGERY | Facility: CLINIC | Age: 69
End: 2019-08-01

## 2019-08-01 VITALS
DIASTOLIC BLOOD PRESSURE: 82 MMHG | SYSTOLIC BLOOD PRESSURE: 168 MMHG | BODY MASS INDEX: 31.84 KG/M2 | TEMPERATURE: 98.1 F | HEART RATE: 50 BPM | OXYGEN SATURATION: 98 % | WEIGHT: 227.4 LBS | HEIGHT: 71 IN

## 2019-08-01 DIAGNOSIS — Z78.9 ELECTRONIC CIGARETTE USE: ICD-10-CM

## 2019-08-01 DIAGNOSIS — Z87.891 STOPPED SMOKING WITH GREATER THAN 40 PACK YEAR HISTORY: ICD-10-CM

## 2019-08-01 DIAGNOSIS — I25.10 ARTERIOSCLEROTIC CARDIOVASCULAR DISEASE (ASCVD): ICD-10-CM

## 2019-08-01 DIAGNOSIS — E78.2 MIXED HYPERLIPIDEMIA: ICD-10-CM

## 2019-08-01 DIAGNOSIS — I73.9 PVD (PERIPHERAL VASCULAR DISEASE) (HCC): Primary | ICD-10-CM

## 2019-08-01 DIAGNOSIS — R09.89 LEFT CAROTID BRUIT: ICD-10-CM

## 2019-08-01 DIAGNOSIS — Z12.2 ENCOUNTER FOR SCREENING FOR LUNG CANCER: ICD-10-CM

## 2019-08-01 PROCEDURE — 99214 OFFICE O/P EST MOD 30 MIN: CPT | Performed by: NURSE PRACTITIONER

## 2019-08-01 RX ORDER — NITROGLYCERIN 0.4 MG/1
0.4 TABLET SUBLINGUAL
COMMUNITY

## 2019-08-01 RX ORDER — CLONIDINE HYDROCHLORIDE 0.1 MG/1
0.1 TABLET ORAL
COMMUNITY
End: 2020-09-23

## 2019-08-01 RX ORDER — AMLODIPINE BESYLATE 5 MG/1
5 TABLET ORAL DAILY
COMMUNITY
End: 2021-03-19

## 2019-08-01 NOTE — PATIENT INSTRUCTIONS
The results of your vascular studies of your right leg shows mild disease with good  circulation, in the left leg shows milddisease with good circulation.  Follow up in one year for carotid ultrasound and repeat ASHWIN.  Establish with Dr. Browning.   Recommended lifestyle modifications to reduce the progression of vascular disease include increasing moderate activity to 150 minutes per week, avoidance of smoking, optimizing blood pressure control, strict control of diabetes, and management of hyperlipidemia.  Choose a diet that emphasizes intake of vegetables, fruits, and whole grains; includes low-fat dairy products, poultry, fish, legumes, nontropical vegetable oils, and nuts; and limits intake of sweets, sugar-sweetened beverages, and red meats.  Limit alcohol intake to one drink per day in females and 2 drinks per day in men.  Consider low dose ct scan for lung cancer screening.

## 2019-08-01 NOTE — PROGRESS NOTES
CVTS Office Progress Note     Subjective   Patient ID: Conrado Cazares is a 69 y.o. male is here today for follow-up PVD.     Chief Complaint:    Chief Complaint   Patient presents with   • Peripheral Vascular Disease     6 mo f/u       PCP:  Yohana Saenz APRN  Cardiology:  Dr. Menon  Nephrology:  Dr. Acosta     History of Present Illness  Mr. Cazares is a pleasant 68 year old male with a history of PVD, HTN, HLD, Neuropathy, depression, Vitamin D deficiency, GERD, and Tobacco Depedendence Syndrome, CAD with history of CABG, he denies recent PCI.  Recently underwent ischemic evaluation with stress test by cardiologist in [place] patient reports studies were negative.  He currently uses vaporizer in place of cigarettes.  He established with [facility] for lifestyle limiting claudication in 2015.  Initial ASHWIN showed 0.66 tri/bi of the RIGHT, and 0.56 tri/bi of the LEFT.  Has since, imroved considerably with medical management of vascular disease with the addition of pletal.  He currently does not report walking distance limitations or claudication at this time.  He has carotid bruit on exam but states he has had carotid duplex in [place] with minimal stenosis will plan follow up in one year.    [facility]    06/2014 ASHWIN: 0.66 tri/bi of the RIGHT, and 0.56 tri/bi of the LEFT  12/21/2015 ASHWIN:  RIGHT .80 triphasic.  LEFT .88 triphasic.  6/27/2016 ASHWIN:  RIGHT .88 triphasic.  LEFT .79 triphasic.  01/03/17   ASHWIN:  RIGHT .82  triphasic.  LEFT .70  Triphasic.  07/06/17  ASHWIN:  RIGHT .86  triphasic.  LEFT .70  Triphasic.  01/18/18   ASHWIN:  RIGHT 1.01  Triphasic.  LEFT .88  Triphasic.  07/19/18   ASHWIN:  RIGHT 0.96  Triphasic.  LEFT .79  Triphasic.  01/24/19 ASHWIN: RIGHT 1.08 Triphasic. LEFT 0.78 Biphasic from femoral to distal  8/1/2019 ASHWIN: Right 0.91 triphasic.  Left 0.80 triphasic.       The following portions of the patient's history were reviewed and updated as appropriate: allergies, current  medications, past family history, past medical history, past social history, past surgical history and problem list.  Recent images independently reviewed.  Available laboratory values reviewed.      Past Medical History:   Diagnosis Date   • Dyslipidemia    • Essential (primary) hypertension    • Essential hypertension    • Hyperlipidemia    • Nicotine dependence      other tobacco product, uncomplicated - vapor cig      • Other atherosclerosis of native arteries of extremities, bilateral legs (CMS/HCC)     with intermittent claudication   • Tobacco dependence syndrome      History reviewed. No pertinent surgical history.  Family History   Problem Relation Age of Onset   • Heart disease Other      Social History     Tobacco Use   • Smoking status: Current Every Day Smoker     Types: Electronic Cigarette   • Smokeless tobacco: Never Used   Substance Use Topics   • Alcohol use: No   • Drug use: No       ALLERGIES:   Dopamine    MEDICATIONS:      Current Outpatient Medications:   •  amLODIPine (NORVASC) 5 MG tablet, Take 5 mg by mouth Daily., Disp: , Rfl:   •  aspirin 325 MG tablet, Take 325 mg by mouth Daily., Disp: , Rfl:   •  atorvastatin (LIPITOR) 20 MG tablet, Take 20 mg by mouth Every Night., Disp: , Rfl:   •  bumetanide (BUMEX) 2 MG tablet, Take 2 mg by mouth Daily., Disp: , Rfl:   •  Cholecalciferol (VITAMIN D3) 1000 units capsule, Take  by mouth., Disp: , Rfl:   •  cilostazol (PLETAL) 100 MG tablet, TAKE ONE TABLET BY MOUTH TWICE DAILY, Disp: 60 tablet, Rfl: 5  •  clonazePAM (KlonoPIN) 1 MG tablet, , Disp: , Rfl:   •  CloNIDine (CATAPRES) 0.1 MG tablet, Take 0.1 mg by mouth every night at bedtime. Take 1 tablet by mouth nightly for bp over 150, Disp: , Rfl:   •  escitalopram (LEXAPRO) 20 MG tablet, Take 20 mg by mouth Daily., Disp: , Rfl:   •  gabapentin (NEURONTIN) 100 MG capsule, Take 100 mg by mouth 3 (Three) Times a Day., Disp: , Rfl:   •  lisinopril (PRINIVIL,ZESTRIL) 10 MG tablet, Take 20 mg by mouth  Daily., Disp: , Rfl:   •  nitroglycerin (NITROSTAT) 0.4 MG SL tablet, Place 0.4 mg under the tongue Every 5 (Five) Minutes As Needed for Chest Pain. Take no more than 3 doses in 15 minutes., Disp: , Rfl:   •  omega-3 acid ethyl esters (LOVAZA) 1 G capsule, Take 2 g by mouth 2 (Two) Times a Day., Disp: , Rfl:   •  primidone (MYSOLINE) 250 MG tablet, Take  by mouth Every Night. 1/2 tab. AM 1 tab. PM, Disp: , Rfl:   •  propranolol LA (INDERAL LA) 160 MG 24 hr capsule, 120 mg Daily., Disp: , Rfl:   •  ranitidine (ZANTAC) 150 MG tablet, Take 150 mg by mouth Every Night., Disp: , Rfl:   •  rOPINIRole (REQUIP) 1 MG tablet, Take 2 mg by mouth Every Night., Disp: , Rfl:     Review of Systems   Constitution: Negative for chills, decreased appetite, fever, weakness and weight loss.   HENT: Negative for congestion, nosebleeds and sore throat.    Eyes: Negative for blurred vision, visual disturbance and visual halos.   Cardiovascular: Positive for leg swelling. Negative for chest pain, claudication and dyspnea on exertion.   Respiratory: Negative for cough, shortness of breath, sputum production and wheezing.    Endocrine: Negative for cold intolerance and polyuria.   Hematologic/Lymphatic: Negative for bleeding problem. Does not bruise/bleed easily.   Skin: Positive for unusual hair distribution. Negative for flushing and nail changes.   Musculoskeletal: Positive for arthritis and joint pain. Negative for back pain.   Gastrointestinal: Negative for bloating, abdominal pain, hematemesis, melena, nausea and vomiting.   Genitourinary: Negative for flank pain and hematuria.   Neurological: Negative for brief paralysis, difficulty with concentration, focal weakness, light-headedness, loss of balance, numbness and paresthesias.        No amaurosis fugax, TIA, or CVA.     Psychiatric/Behavioral: Negative for altered mental status, depression, substance abuse and suicidal ideas.   Allergic/Immunologic: Negative for hives and persistent  infections.     Vitals:    08/01/19 1326   BP: 168/82   Pulse: 50   Temp: 98.1 °F (36.7 °C)   SpO2: 98%      Objective   Temp:  [98.1 °F (36.7 °C)] 98.1 °F (36.7 °C)  Heart Rate:  [50] 50  BP: (168)/(82) 168/82  Body mass index is 31.72 kg/m².  Physical Exam   Constitutional: He is oriented to person, place, and time. He appears well-developed and well-nourished.   HENT:   Head: Normocephalic and atraumatic.   Mouth/Throat: Oropharynx is clear and moist.   Eyes: Conjunctivae and EOM are normal. Pupils are equal, round, and reactive to light.   Neck: Normal range of motion. Neck supple. No JVD present.   Cardiovascular: Normal rate, regular rhythm, normal heart sounds and intact distal pulses.   Pulses:       Carotid pulses are 1+ on the right side with bruit, and 1+ on the left side.       Dorsalis pedis pulses are 1+ on the right side, and 1+ on the left side.        Posterior tibial pulses are 1+ on the right side, and 1+ on the left side.   Venous stasis   Pulmonary/Chest: Effort normal and breath sounds normal. No stridor. No respiratory distress.   Abdominal: Soft. Bowel sounds are normal.   Musculoskeletal: Normal range of motion. He exhibits no edema or tenderness.   Lymphadenopathy:     He has no cervical adenopathy.   Neurological: He is alert and oriented to person, place, and time. No cranial nerve deficit. Coordination normal.   Skin: Skin is warm and dry. Capillary refill takes less than 2 seconds.   Venous staining present   Psychiatric: He has a normal mood and affect. His behavior is normal. Judgment normal.   Nursing note and vitals reviewed.        Assessment & Plan     Independent Review of Studies  8/1/2019 ASHWIN: Right 0.91 triphasic.  Left 0.80 triphasic.    Peripheral Vascular Disease  Results of todays ASHWIN shows moderate disease of the left leg and mild of the right.  Currently, patient does not complain of claudication, and can ambulate unhindered.  Would recommend repeat ASHWIN in 12 months for  surveillance of PVD.  Medical management includes ACE/ARB, BB, STATIN, Anti-Platelet, and VIT D if known deficiency.  We have discussed the benefit of improving conditioning as a means to improve walking distance and claudication symptoms.  Lifestyle changes as below.    Right Carotid Bruit  Carotid Duplex at next visit    ASCVD  Patient reports history of CAD.  Patient had CABG in 2007 at [facility], will obtain records.  Patient previously established with cardiology in [place] and recently underwent stress test which patient reported was negative.  However he reports his cardiologist is retiring.  Patient does report recent history of exertional dyspnea, and chest tightness.  We reviewed the symptoms of acute coronary syndrome and patient was instructed to visit the emergency department if the symptoms do not resolve with rest.  Established with Dr. Browning for new cardiologist.    Lung Cancer Screening  >30PY smoking history.  Meets criteria for LDCT, patient understands and wishes to enroll in annual surveillance for lung cancer.      Asymptomatic bradycardia  Continue to monitor heart rate with administration of beta-blockers.  Follow-up with cardiology as necessary.    Lifestyle Modifications  Recommended lifestyle modifications to reduce the progression of vascular disease include increasing moderate activity to 150 minutes per week, avoidance of smoking, optimizing blood pressure control, strict control of diabetes, and management of hyperlipidemia.  Choose a diet that emphasizes intake of vegetables, fruits, and whole grains; includes low-fat dairy products, poultry, fish, legumes, nontropical vegetable oils, and nuts; and limits intake of sweets, sugar-sweetened beverages, and red meats.  Limit alcohol intake to one drink per day in females and 2 drinks per day in men.      Class 1 Obesity  We have discussed the benefits of weight loss as it relates to long term morbidity and disease prevention.   Interventions discussed included self-monitoring of caloric intake, increasing physical activity/exercise, goal setting, stimulus control, consultation with dietitian, and non-food rewards.      Tobacco Dependence Syndrome  Currently no longer smoking cigarettes.  Patient is using e-cigarette.  We have discussed that E cigarettes are not well studied in the long-term health benefits are unknown.  Advised cessation and the use of nicotine replacement therapy in the form of patches gum or lozenges        Detailed discussion regarding risks, benefits, and treatment plan. Images independently reviewed. Patient understands, agrees, and wishes to proceed with plan.

## 2019-08-06 ENCOUNTER — HOSPITAL ENCOUNTER (OUTPATIENT)
Dept: CT IMAGING | Facility: HOSPITAL | Age: 69
Discharge: HOME | End: 2019-08-06
Admitting: NURSE PRACTITIONER

## 2019-08-06 DIAGNOSIS — Z87.891 STOPPED SMOKING WITH GREATER THAN 40 PACK YEAR HISTORY: ICD-10-CM

## 2019-08-06 PROCEDURE — G0297 LDCT FOR LUNG CA SCREEN: HCPCS

## 2019-09-06 ENCOUNTER — OFFICE VISIT (OUTPATIENT)
Dept: CARDIOLOGY | Facility: CLINIC | Age: 69
End: 2019-09-06

## 2019-09-06 VITALS
OXYGEN SATURATION: 98 % | HEART RATE: 58 BPM | BODY MASS INDEX: 32.13 KG/M2 | SYSTOLIC BLOOD PRESSURE: 150 MMHG | DIASTOLIC BLOOD PRESSURE: 70 MMHG | HEIGHT: 71 IN | WEIGHT: 229.5 LBS

## 2019-09-06 DIAGNOSIS — Z87.891 FORMER SMOKER: ICD-10-CM

## 2019-09-06 DIAGNOSIS — E66.09 CLASS 1 OBESITY DUE TO EXCESS CALORIES WITHOUT SERIOUS COMORBIDITY WITH BODY MASS INDEX (BMI) OF 32.0 TO 32.9 IN ADULT: ICD-10-CM

## 2019-09-06 DIAGNOSIS — E78.2 MIXED HYPERLIPIDEMIA: ICD-10-CM

## 2019-09-06 DIAGNOSIS — Z78.9 ELECTRONIC CIGARETTE USE: ICD-10-CM

## 2019-09-06 DIAGNOSIS — I25.10 CORONARY ARTERY DISEASE INVOLVING NATIVE CORONARY ARTERY OF NATIVE HEART WITHOUT ANGINA PECTORIS: Primary | ICD-10-CM

## 2019-09-06 DIAGNOSIS — I25.10 ARTERIOSCLEROTIC CARDIOVASCULAR DISEASE (ASCVD): Primary | ICD-10-CM

## 2019-09-06 DIAGNOSIS — R06.09 DYSPNEA ON EXERTION: ICD-10-CM

## 2019-09-06 PROCEDURE — 99204 OFFICE O/P NEW MOD 45 MIN: CPT | Performed by: INTERNAL MEDICINE

## 2019-09-06 PROCEDURE — 93005 ELECTROCARDIOGRAM TRACING: CPT | Performed by: INTERNAL MEDICINE

## 2019-09-06 NOTE — PROGRESS NOTES
Cardiovascular Medicine      Vinay Browning M.D., Ph.D., [facility]           Thank you for asking me to see Conrado Cazares for ASCAD.    History of Present Illness  This is a 69 y.o. male with:    1. ASCAD with remote CABG, 2007  2. Risks: Obese, former smoker, PAD, HLD, HTN, CKD    Conrado Cazraes is a 69 y.o. male who presents for evaluation of atherosclerotic coronary artery disease. He does have known ASCAD. He had CABG in 2007. He did not have a MI. He was laving left arm pain and left shoulder pain. He was a . He attributed the pain to this. Nevertheless, he did see cardiology in 2007. He saw a cardiologist who ordered an ischemia evaluation. He started on a TST, but he tells me this was discontinued. He had a LHC and was told he had MV ASCAD. He tells me he had a LIMA and possibly a SVG to circumflex. Recent history: taking medications as instructed, no medication side effects noted, no TIA's, no chest pain on exertion, no dyspnea on exertion and no swelling of ankles. Patient's symptoms have been stable. Medication side effects include: none. He has very mild dyspnea and LE edema. He is pretty physically active. He likes to be outside. He presents today because his cardiologist is retiring. He has not had a recent TTE. He has had two stress tests since being seen in [place]. These were normal. He does have PAD. He does use Pletal. He does have CKD and does have proteinuria. This has been attributed to the cause of his LE edema. He is on Bumex with nephrology.     The following portions of the patient's history were reviewed and updated as appropriate: allergies, current medications, past family history, past medical history, past social history, past surgical history and problem list.      Review of Systems - Review of Systems   Cardiovascular: Positive for dyspnea on exertion and leg swelling. Negative for chest pain, claudication, cyanosis, irregular heartbeat, near-syncope, orthopnea,  palpitations, paroxysmal nocturnal dyspnea and syncope.   Respiratory: Positive for shortness of breath. Negative for cough, hemoptysis and sleep disturbances due to breathing.         All other systems were reviewed and were negative.    family history includes Heart disease in his other.     reports that he has been smoking electronic cigarette.  He has never used smokeless tobacco. He reports that he does not drink alcohol or use drugs.    Allergies   Allergen Reactions   • Dopamine Dizziness     Dropped BP          Current Outpatient Medications:   •  amLODIPine (NORVASC) 5 MG tablet, Take 5 mg by mouth Daily., Disp: , Rfl:   •  aspirin 325 MG tablet, Take 325 mg by mouth Daily., Disp: , Rfl:   •  atorvastatin (LIPITOR) 20 MG tablet, Take 20 mg by mouth Every Night., Disp: , Rfl:   •  bumetanide (BUMEX) 2 MG tablet, Take 2 mg by mouth Daily., Disp: , Rfl:   •  Cholecalciferol (VITAMIN D3) 1000 units capsule, Take  by mouth., Disp: , Rfl:   •  cilostazol (PLETAL) 100 MG tablet, TAKE ONE TABLET BY MOUTH TWICE DAILY, Disp: 60 tablet, Rfl: 5  •  clonazePAM (KlonoPIN) 1 MG tablet, , Disp: , Rfl:   •  CloNIDine (CATAPRES) 0.1 MG tablet, Take 0.1 mg by mouth every night at bedtime. Take 1 tablet by mouth nightly for bp over 150, Disp: , Rfl:   •  escitalopram (LEXAPRO) 20 MG tablet, Take 20 mg by mouth Daily., Disp: , Rfl:   •  gabapentin (NEURONTIN) 100 MG capsule, Take 100 mg by mouth 3 (Three) Times a Day., Disp: , Rfl:   •  lisinopril (PRINIVIL,ZESTRIL) 10 MG tablet, Take 20 mg by mouth Daily., Disp: , Rfl:   •  nitroglycerin (NITROSTAT) 0.4 MG SL tablet, Place 0.4 mg under the tongue Every 5 (Five) Minutes As Needed for Chest Pain. Take no more than 3 doses in 15 minutes., Disp: , Rfl:   •  omega-3 acid ethyl esters (LOVAZA) 1 G capsule, Take 2 g by mouth 2 (Two) Times a Day., Disp: , Rfl:   •  primidone (MYSOLINE) 250 MG tablet, Take  by mouth Every Night. 1/2 tab. AM 1 tab. PM, Disp: , Rfl:   •  propranolol LA  "(INDERAL LA) 160 MG 24 hr capsule, 120 mg Daily., Disp: , Rfl:   •  ranitidine (ZANTAC) 150 MG tablet, Take 150 mg by mouth Every Night., Disp: , Rfl:   •  rOPINIRole (REQUIP) 1 MG tablet, Take 2 mg by mouth Every Night., Disp: , Rfl:       Physical Exam:  Vitals:    09/06/19 0905   BP: 150/70   BP Location: Left arm   Patient Position: Sitting   Cuff Size: Adult   Pulse: 58   SpO2: 98%   Weight: 104 kg (229 lb 8 oz)   Height: 180.3 cm (71\")   PainSc: 0-No pain     Pulse Ox: Normal  on room air  General: alert, appears stated age and cooperative     Body Habitus: obese    HEENT: Head: Normocephalic, no lesions, without obvious abnormality. No arcus senilis, xanthelasma or xanthomas.    Neuro: alert, oriented x3  speech normal in context and clarity  memory intact grossly  Pulses: 2+ and symmetric  JVP: Volume/Pulsation: Normal.  Normal waveforms.   Appropriate inspiratory decrease.  No Kussmaul's. No Janis's.   Carotid Exam: no bruit normal pulsation bilaterally   Carotid Volume: normal.     Subclavian Bruit: absent  Vertebral Bruit: absent  Respirations: no increased work of breathing   Chest:  scar midline sternotomy    Pulmonary:Normal     Precordium: Normal impulses. P2 is not palpable.  RV Heave: absent  LV Heave: absent  [place]:  normal size and placement  Palpable S4: absent.  Heart rate: bradycardic    Heart Rhythm: regular     Heart Sounds: S1: normal intensity  S2: normal intensity  S3: absent   S4: absent  Opening Snap: absent    A2-OS:  no  Pericardial Rub:  Absent   Ejection click: None      Murmurs:  absent   Extremity: moves all extremities equally.       DATA REVIEWED:     EKG. I personally reviewed and interpreted the EKG.           Lab Results   Component Value Date    HGBA1C 6.7 (H) 07/15/2019     No results found for: DDIMER  No results found for: ALT  Lab Results   Component Value Date    HGBA1C 6.7 (H) 07/15/2019    HGBA1C 7.1 (H) 04/17/2019       Assessment/Plan     1. Arteriosclerotic " "cardiovascular disease (ASCVD). No anginal symptoms. The patient has been revascularized.  Revascularization:  CABG.   -Propranolol  -ASA--DOSE reduce 81mg  -Lipitor (atorvastatin)  -SL nitro prn  -Call 911 immediately for concerning symptoms.    2. Cardiac Risk Assessments:  -Continue follow-up visits with PCP for monitoring of labs; Dietary changes: Increase soluble fiber: A printed copy of a low fat, low cholesterol diet was given; Reduce saturated fat, \"trans\" monounsaturated fatty acids, and cholesterol; Exercise changes:  Encouraged daily aerobic activity.    -Essential HTN is a significant risk factor for stroke, heart disease and vascular disease. I've recommended the patient continue current medications, if any, as prescribed by the primary care provider. I recommended they have close follow-up for ongoing mgmt of this and the medical comorbidities associated with HTN with their PCP.    The patient's BMI is Body mass index is 32.01 kg/m²..  This places the patient in weight class:  Obese Class I: 30-34.9kg/m2.   -Weight loss hand-out  -Exercise intervention:   Hand out, increase aerobic activity  cut out extra servings, decrease soda or juice intake, eat breakfast, eat more fruits and vegetables, family to eat at dinner table more often, have 3 meals a day, increase physical activity, increase water intake, keep TV off during meals, plan meals, reduce fast food intake, reduce portion size and reduce screen time  -Close PCP follow-up for Body mass index is 32.01 kg/m²..       -Conrado Cazares is a current electronic cigarettes user.  He uses a bottle of liquid per month. This does contain nicotine.  I have educated him on the risk of diseases from using tobacco products such as cancer, COPD and heart diease.   I advised him to quit and he is not willing to quit. I spent 3  minutes.  -1-800-QUIT-NOW information was provided.     Screening:  -AAA screening recommended at 65.    3. Dyspnea and LE edema. No " overt elevated filling pressures on exam today. I would like to assess his LV EF.   -TTE    Return in about 1 month (around 10/6/2019).

## 2019-09-06 NOTE — PATIENT INSTRUCTIONS
Preventing Unhealthy Weight Gain, Adult  Staying at a healthy weight is important to your overall health. When fat builds up in your body, you may become overweight or obese. Being overweight or obese increases your risk of developing certain health problems, such as heart disease, diabetes, sleeping problems, joint problems, and some types of cancer.  Unhealthy weight gain is often the result of making unhealthy food choices or not getting enough exercise. You can make changes to your lifestyle to prevent obesity and stay as healthy as possible.  What nutrition changes can be made?    Eat only as much as your body needs. To do this:  Pay attention to signs that you are hungry or full. Stop eating as soon as you feel full.  If you feel hungry, try drinking water first before eating. Drink enough water so your urine is clear or pale yellow.  Eat smaller portions. Pay attention to portion sizes when eating out.  Look at serving sizes on food labels. Most foods contain more than one serving per container.  Eat the recommended number of calories for your gender and activity level. For most active people, a daily total of 2,000 calories is appropriate. If you are trying to lose weight or are not very active, you may need to eat fewer calories. Talk with your health care provider or a diet and nutrition specialist (dietitian) about how many calories you need each day.  Choose healthy foods, such as:  Fruits and vegetables. At each meal, try to fill at least half of your plate with fruits and vegetables.  Whole grains, such as whole-wheat bread, brown rice, and quinoa.  Lean meats, such as chicken or fish.  Other healthy proteins, such as beans, eggs, or tofu.  Healthy fats, such as nuts, seeds, fatty fish, and olive oil.  Low-fat or fat-free dairy products.  Check food labels, and avoid food and drinks that:  Are high in calories.  Have added sugar.  Are high in sodium.  Have saturated fats or trans fats.  Cook foods in  healthier ways, such as by baking, broiling, or grilling.  Make a meal plan for the week, and shop with a grocery list to help you stay on track with your purchases. Try to avoid going to the grocery store when you are hungry.  When grocery shopping, try to shop around the outside of the store first, where the fresh foods are. Doing this helps you to avoid prepackaged foods, which can be high in sugar, salt (sodium), and fat.  What lifestyle changes can be made?    Exercise for 30 or more minutes on 5 or more days each week. Exercising may include brisk walking, yard work, biking, running, swimming, and team sports like basketball and soccer. Ask your health care provider which exercises are safe for you.  Do muscle-strengthening activities, such as lifting weights or using resistance bands, on 2 or more days a week.  Do not use any products that contain nicotine or tobacco, such as cigarettes and e-cigarettes. If you need help quitting, ask your health care provider.  Limit alcohol intake to no more than 1 drink a day for nonpregnant women and 2 drinks a day for men. One drink equals 12 oz of beer, 5 oz of wine, or 1½ oz of hard liquor.  Try to get 7-9 hours of sleep each night.  What other changes can be made?  Keep a food and activity journal to keep track of:  What you ate and how many calories you had. Remember to count the calories in sauces, dressings, and side dishes.  Whether you were active, and what exercises you did.  Your calorie, weight, and activity goals.  Check your weight regularly. Track any changes. If you notice you have gained weight, make changes to your diet or activity routine.  Avoid taking weight-loss medicines or supplements. Talk to your health care provider before starting any new medicine or supplement.  Talk to your health care provider before trying any new diet or exercise plan.  Why are these changes important?  Eating healthy, staying active, and having healthy habits can help you  to prevent obesity. Those changes also:  Help you manage stress and emotions.  Help you connect with friends and family.  Improve your self-esteem.  Improve your sleep.  Prevent long-term health problems.  What can happen if changes are not made?  Being obese or overweight can cause you to develop joint or bone problems, which can make it hard for you to stay active or do activities you enjoy. Being obese or overweight also puts stress on your heart and lungs and can lead to health problems like diabetes, heart disease, and some cancers.  Where to find more information  Talk with your health care provider or a dietitian about healthy eating and healthy lifestyle choices. You may also find information from:  [place] Department of Agriculture, MyPlate: www.choosemyplate.gov  American Heart Association: www.heart.org  Centers for Disease Control and Prevention: www.cdc.gov  Summary  Staying at a healthy weight is important to your overall health. It helps you to prevent certain diseases and health problems, such as heart disease, diabetes, joint problems, sleep disorders, and some types of cancer.  Being obese or overweight can cause you to develop joint or bone problems, which can make it hard for you to stay active or do activities you enjoy.  You can prevent unhealthy weight gain by eating a healthy diet, exercising regularly, not smoking, limiting alcohol, and getting enough sleep.  Talk with your health care provider or a dietitian for guidance about healthy eating and healthy lifestyle choices.  This information is not intended to replace advice given to you by your health care provider. Make sure you discuss any questions you have with your health care provider.  Document Released: 12/19/2017 Document Revised: 09/28/2018 Document Reviewed: 01/24/2018  [employer] Interactive Patient Education © 2019 [employer]  Steps to Quit Smoking    Smoking tobacco can be harmful to your health and can affect almost every organ  in your body. Smoking puts you, and those around you, at risk for developing many serious chronic diseases. Quitting smoking is difficult, but it is one of the best things that you can do for your health. It is never too late to quit.  What are the benefits of quitting smoking?  When you quit smoking, you lower your risk of developing serious diseases and conditions, such as:  Lung cancer or lung disease, such as COPD.  Heart disease.  Stroke.  Heart attack.  Infertility.  Osteoporosis and bone fractures.  Additionally, symptoms such as coughing, wheezing, and shortness of breath may get better when you quit. You may also find that you get sick less often because your body is stronger at fighting off colds and infections. If you are pregnant, quitting smoking can help to reduce your chances of having a baby of low birth weight.  How do I get ready to quit?  When you decide to quit smoking, create a plan to make sure that you are successful. Before you quit:  Pick a date to quit. Set a date within the next two weeks to give you time to prepare.  Write down the reasons why you are quitting. Keep this list in places where you will see it often, such as on your bathroom mirror or in your car or wallet.  Identify the people, places, things, and activities that make you want to smoke (triggers) and avoid them. Make sure to take these actions:  Throw away all cigarettes at home, at work, and in your car.  Throw away smoking accessories, such as ashtrays and lighters.  Clean your car and make sure to empty the ashtray.  Clean your home, including curtains and carpets.  Tell your family, friends, and coworkers that you are quitting. Support from your loved ones can make quitting easier.  Talk with your health care provider about your options for quitting smoking.  Find out what treatment options are covered by your health insurance.  What strategies can I use to quit smoking?  Talk with your healthcare provider about  different strategies to quit smoking. Some strategies include:  Quitting smoking altogether instead of gradually lessening how much you smoke over a period of time. Research shows that quitting “cold turkey” is more successful than gradually quitting.  Attending in-person counseling to help you build problem-solving skills. You are more likely to have success in quitting if you attend several counseling sessions. Even short sessions of 10 minutes can be effective.  Finding resources and support systems that can help you to quit smoking and remain smoke-free after you quit. These resources are most helpful when you use them often. They can include:  Online chats with a counselor.  Telephone quitlines.  Printed self-help materials.  Support groups or group counseling.  Text messaging programs.  Mobile phone applications.  Taking medicines to help you quit smoking. (If you are pregnant or breastfeeding, talk with your health care provider first.) Some medicines contain nicotine and some do not. Both types of medicines help with cravings, but the medicines that include nicotine help to relieve withdrawal symptoms. Your health care provider may recommend:  Nicotine patches, gum, or lozenges.  Nicotine inhalers or sprays.  Non-nicotine medicine that is taken by mouth.  Talk with your health care provider about combining strategies, such as taking medicines while you are also receiving in-person counseling. Using these two strategies together makes you more likely to succeed in quitting than if you used either strategy on its own.  If you are pregnant or breastfeeding, talk with your health care provider about finding counseling or other support strategies to quit smoking. Do not take medicine to help you quit smoking unless told to do so by your health care provider.  What things can I do to make it easier to quit?  Quitting smoking might feel overwhelming at first, but there is a lot that you can do to make it easier.  Take these important actions:  Reach out to your family and friends and ask that they support and encourage you during this time. Call telephone quitlines, reach out to support groups, or work with a counselor for support.  Ask people who smoke to avoid smoking around you.  Avoid places that trigger you to smoke, such as bars, parties, or smoke-break areas at work.  Spend time around people who do not smoke.  Lessen stress in your life, because stress can be a smoking trigger for some people. To lessen stress, try:  Exercising regularly.  Deep-breathing exercises.  Yoga.  Meditating.  Performing a body scan. This involves closing your eyes, scanning your body from head to toe, and noticing which parts of your body are particularly tense. Purposefully relax the muscles in those areas.  Download or purchase mobile phone or tablet apps (applications) that can help you stick to your quit plan by providing reminders, tips, and encouragement. There are many free apps, such as QuitGuide from the CDC (Centers for Disease Control and Prevention). You can find other support for quitting smoking (smoking cessation) through smokefree.gov and other websites.  How will I feel when I quit smoking?  Within the first 24 hours of quitting smoking, you may start to feel some withdrawal symptoms. These symptoms are usually most noticeable 2-3 days after quitting, but they usually do not last beyond 2-3 weeks. Changes or symptoms that you might experience include:  Mood swings.  Restlessness, anxiety, or irritation.  Difficulty concentrating.  Dizziness.  Strong cravings for sugary foods in addition to nicotine.  Mild weight gain.  Constipation.  Nausea.  Coughing or a sore throat.  Changes in how your medicines work in your body.  A depressed mood.  Difficulty sleeping (insomnia).  After the first 2-3 weeks of quitting, you may start to notice more positive results, such as:  Improved sense of smell and taste.  Decreased coughing and  sore throat.  Slower heart rate.  Lower blood pressure.  Clearer skin.  The ability to breathe more easily.  Fewer sick days.  Quitting smoking is very challenging for most people. Do not get discouraged if you are not successful the first time. Some people need to make many attempts to quit before they achieve long-term success. Do your best to stick to your quit plan, and talk with your health care provider if you have any questions or concerns.  This information is not intended to replace advice given to you by your health care provider. Make sure you discuss any questions you have with your health care provider.  Document Released: 12/12/2002 Document Revised: 07/24/2018 Document Reviewed: 05/03/2016  [employer] Interactive Patient Education © 2019 [employer]  Echocardiogram  An echocardiogram is a procedure that uses painless sound waves (ultrasound) to produce an image of the heart. Images from an echocardiogram can provide important information about:  · Signs of coronary artery disease (CAD).  · Aneurysm detection. An aneurysm is a weak or damaged part of an artery wall that bulges out from the normal force of blood pumping through the body.  · Heart size and shape. Changes in the size or shape of the heart can be associated with certain conditions, including heart failure, aneurysm, and CAD.  · Heart muscle function.  · Heart valve function.  · Signs of a past heart attack.  · Fluid buildup around the heart.  · Thickening of the heart muscle.  · A tumor or infectious growth around the heart valves.  Tell a health care provider about:  · Any allergies you have.  · All medicines you are taking, including vitamins, herbs, eye drops, creams, and over-the-counter medicines.  · Any blood disorders you have.  · Any surgeries you have had.  · Any medical conditions you have.  · Whether you are pregnant or may be pregnant.  What are the risks?  Generally, this is a safe procedure. However, problems may occur,  including:  · Allergic reaction to dye (contrast) that may be used during the procedure.  What happens before the procedure?  No specific preparation is needed. You may eat and drink normally.  What happens during the procedure?    · An IV tube may be inserted into one of your veins.  · You may receive contrast through this tube. A contrast is an injection that improves the quality of the pictures from your heart.  · A gel will be applied to your chest.  · A wand-like tool (transducer) will be moved over your chest. The gel will help to transmit the sound waves from the transducer.  · The sound waves will harmlessly bounce off of your heart to allow the heart images to be captured in real-time motion. The images will be recorded on a computer.  The procedure may vary among health care providers and hospitals.  What happens after the procedure?  · You may return to your normal, everyday life, including diet, activities, and medicines, unless your health care provider tells you not to do that.  Summary  · An echocardiogram is a procedure that uses painless sound waves (ultrasound) to produce an image of the heart.  · Images from an echocardiogram can provide important information about the size and shape of your heart, heart muscle function, heart valve function, and fluid buildup around your heart.  · You do not need to do anything to prepare before this procedure. You may eat and drink normally.  · After the echocardiogram is completed, you may return to your normal, everyday life, unless your health care provider tells you not to do that.  This information is not intended to replace advice given to you by your health care provider. Make sure you discuss any questions you have with your health care provider.  Document Released: 12/15/2001 Document Revised: 01/20/2018 Document Reviewed: 01/20/2018  [employer] Interactive Patient Education © 2019 [employer]

## 2019-09-13 LAB
BH CV ECHO MEAS - ACS: 2 CM
BH CV ECHO MEAS - AO ISTHMUS: 2.8 CM
BH CV ECHO MEAS - AO MAX PG (FULL): 2.6 MMHG
BH CV ECHO MEAS - AO MAX PG: 5 MMHG
BH CV ECHO MEAS - AO MEAN PG (FULL): 2 MMHG
BH CV ECHO MEAS - AO MEAN PG: 3 MMHG
BH CV ECHO MEAS - AO ROOT AREA (BSA CORRECTED): 1.7
BH CV ECHO MEAS - AO ROOT AREA: 10.8 CM^2
BH CV ECHO MEAS - AO ROOT DIAM: 3.7 CM
BH CV ECHO MEAS - AO V2 MAX: 112 CM/SEC
BH CV ECHO MEAS - AO V2 MEAN: 75.1 CM/SEC
BH CV ECHO MEAS - AO V2 VTI: 29.3 CM
BH CV ECHO MEAS - ASC AORTA: 3.7 CM
BH CV ECHO MEAS - AVA(I,A): 2.6 CM^2
BH CV ECHO MEAS - AVA(I,D): 2.6 CM^2
BH CV ECHO MEAS - AVA(V,A): 2.6 CM^2
BH CV ECHO MEAS - AVA(V,D): 2.6 CM^2
BH CV ECHO MEAS - BSA(HAYCOCK): 2.3 M^2
BH CV ECHO MEAS - BSA: 2.2 M^2
BH CV ECHO MEAS - BZI_BMI: 31.9 KILOGRAMS/M^2
BH CV ECHO MEAS - BZI_METRIC_HEIGHT: 180.3 CM
BH CV ECHO MEAS - BZI_METRIC_WEIGHT: 103.9 KG
BH CV ECHO MEAS - EDV(CUBED): 105.2 ML
BH CV ECHO MEAS - EDV(MOD-SP2): 78.2 ML
BH CV ECHO MEAS - EDV(MOD-SP4): 94.7 ML
BH CV ECHO MEAS - EDV(TEICH): 103.4 ML
BH CV ECHO MEAS - EF(CUBED): 44.5 %
BH CV ECHO MEAS - EF(MOD-SP2): 51 %
BH CV ECHO MEAS - EF(MOD-SP4): 43.3 %
BH CV ECHO MEAS - EF(TEICH): 37 %
BH CV ECHO MEAS - EPSS: 0.5 CM
BH CV ECHO MEAS - ESV(CUBED): 58.4 ML
BH CV ECHO MEAS - ESV(MOD-SP2): 38.3 ML
BH CV ECHO MEAS - ESV(MOD-SP4): 53.7 ML
BH CV ECHO MEAS - ESV(TEICH): 65.1 ML
BH CV ECHO MEAS - FS: 17.8 %
BH CV ECHO MEAS - IVS/LVPW: 0.78
BH CV ECHO MEAS - IVSD: 1 CM
BH CV ECHO MEAS - LA DIMENSION: 4.2 CM
BH CV ECHO MEAS - LA/AO: 1.1
BH CV ECHO MEAS - LV DIASTOLIC VOL/BSA (35-75): 42.4 ML/M^2
BH CV ECHO MEAS - LV MASS(C)D: 204.6 GRAMS
BH CV ECHO MEAS - LV MASS(C)DI: 91.6 GRAMS/M^2
BH CV ECHO MEAS - LV MAX PG: 2.4 MMHG
BH CV ECHO MEAS - LV MEAN PG: 1 MMHG
BH CV ECHO MEAS - LV SYSTOLIC VOL/BSA (12-30): 24 ML/M^2
BH CV ECHO MEAS - LV V1 MAX: 77.6 CM/SEC
BH CV ECHO MEAS - LV V1 MEAN: 55.4 CM/SEC
BH CV ECHO MEAS - LV V1 VTI: 20.3 CM
BH CV ECHO MEAS - LVIDD: 4.7 CM
BH CV ECHO MEAS - LVIDS: 3.9 CM
BH CV ECHO MEAS - LVLD AP2: 7.6 CM
BH CV ECHO MEAS - LVLD AP4: 7.4 CM
BH CV ECHO MEAS - LVLS AP2: 6.1 CM
BH CV ECHO MEAS - LVLS AP4: 6.4 CM
BH CV ECHO MEAS - LVOT AREA (M): 3.8 CM^2
BH CV ECHO MEAS - LVOT AREA: 3.8 CM^2
BH CV ECHO MEAS - LVOT DIAM: 2.2 CM
BH CV ECHO MEAS - LVPWD: 1.3 CM
BH CV ECHO MEAS - MR MAX PG: 89.9 MMHG
BH CV ECHO MEAS - MR MAX VEL: 474 CM/SEC
BH CV ECHO MEAS - MV A MAX VEL: 93.1 CM/SEC
BH CV ECHO MEAS - MV DEC SLOPE: 570 CM/SEC^2
BH CV ECHO MEAS - MV E MAX VEL: 111 CM/SEC
BH CV ECHO MEAS - MV E/A: 1.2
BH CV ECHO MEAS - MV MAX PG: 5.3 MMHG
BH CV ECHO MEAS - MV MEAN PG: 1 MMHG
BH CV ECHO MEAS - MV P1/2T MAX VEL: 113 CM/SEC
BH CV ECHO MEAS - MV P1/2T: 58.1 MSEC
BH CV ECHO MEAS - MV V2 MAX: 115 CM/SEC
BH CV ECHO MEAS - MV V2 MEAN: 54.4 CM/SEC
BH CV ECHO MEAS - MV V2 VTI: 34.5 CM
BH CV ECHO MEAS - MVA P1/2T LCG: 1.9 CM^2
BH CV ECHO MEAS - MVA(P1/2T): 3.8 CM^2
BH CV ECHO MEAS - MVA(VTI): 2.2 CM^2
BH CV ECHO MEAS - PA MAX PG: 3.8 MMHG
BH CV ECHO MEAS - PA MEAN PG: 2 MMHG
BH CV ECHO MEAS - PA V2 MAX: 97 CM/SEC
BH CV ECHO MEAS - PA V2 MEAN: 60.1 CM/SEC
BH CV ECHO MEAS - PA V2 VTI: 21.5 CM
BH CV ECHO MEAS - PI END-D VEL: 168 CM/SEC
BH CV ECHO MEAS - RAP SYSTOLE: 3 MMHG
BH CV ECHO MEAS - RVSP: 45 MMHG
BH CV ECHO MEAS - SI(AO): 141 ML/M^2
BH CV ECHO MEAS - SI(CUBED): 20.9 ML/M^2
BH CV ECHO MEAS - SI(LVOT): 34.5 ML/M^2
BH CV ECHO MEAS - SI(MOD-SP2): 17.9 ML/M^2
BH CV ECHO MEAS - SI(MOD-SP4): 18.4 ML/M^2
BH CV ECHO MEAS - SI(TEICH): 17.1 ML/M^2
BH CV ECHO MEAS - SV(AO): 315 ML
BH CV ECHO MEAS - SV(CUBED): 46.7 ML
BH CV ECHO MEAS - SV(LVOT): 77.2 ML
BH CV ECHO MEAS - SV(MOD-SP2): 39.9 ML
BH CV ECHO MEAS - SV(MOD-SP4): 41 ML
BH CV ECHO MEAS - SV(TEICH): 38.3 ML
BH CV ECHO MEAS - TR MAX VEL: 280 CM/SEC

## 2019-10-04 ENCOUNTER — OFFICE VISIT (OUTPATIENT)
Dept: CARDIOLOGY | Facility: CLINIC | Age: 69
End: 2019-10-04

## 2019-10-04 VITALS
WEIGHT: 223.3 LBS | OXYGEN SATURATION: 99 % | HEART RATE: 56 BPM | SYSTOLIC BLOOD PRESSURE: 140 MMHG | BODY MASS INDEX: 31.26 KG/M2 | DIASTOLIC BLOOD PRESSURE: 72 MMHG | HEIGHT: 71 IN

## 2019-10-04 DIAGNOSIS — I36.1 NONRHEUMATIC TRICUSPID VALVE REGURGITATION: ICD-10-CM

## 2019-10-04 DIAGNOSIS — I37.1 NONRHEUMATIC PULMONARY VALVE INSUFFICIENCY: ICD-10-CM

## 2019-10-04 DIAGNOSIS — I34.0 NONRHEUMATIC MITRAL VALVE REGURGITATION: ICD-10-CM

## 2019-10-04 DIAGNOSIS — I27.20 PULMONARY HYPERTENSION (HCC): Primary | ICD-10-CM

## 2019-10-04 DIAGNOSIS — I31.39 PERICARDIAL EFFUSION: ICD-10-CM

## 2019-10-04 DIAGNOSIS — I25.10 CORONARY ARTERY DISEASE INVOLVING NATIVE CORONARY ARTERY OF NATIVE HEART WITHOUT ANGINA PECTORIS: ICD-10-CM

## 2019-10-04 PROCEDURE — 99214 OFFICE O/P EST MOD 30 MIN: CPT | Performed by: INTERNAL MEDICINE

## 2019-10-04 NOTE — PROGRESS NOTES
Cardiovascular Medicine      Vinay Browning M.D., Ph.D., [facility]             History of Present Illness  This is a 69 y.o. male with:    1. PAH  2. MR  3. TR  4. PI  5. Pericardial effusion  6. ASCAD with remote CABG, 2007  7. Risks: Obese, former smoker, PAD, HLD, HTN, CKD    Conrado Cazares is a 69 y.o. male who presents in follow-up.  I saw him to establish care for coronary artery disease.  Patient had a abnormal ischemia evaluation in the past after he complained of left arm pain and left shoulder pain.  Invasive coronary angiography in 2007 showed multivessel coronary artery disease.  He believes that he has a LIMA and possibly a saphenous vein graft to the circumflex.  When I last saw him he was prescribed aspirin, atorvastatin and propranolol.  He is had no resting, exertional or nocturnal angina.  He was complaining of significant exertional dyspnea and lower extremity edema.  He was sent for a 2D echocardiogram which showed pulmonary hypertension.  PA pressures were elevated.  The patient's right ventricle was dilated, but had normal RVEF.  He was found to have significant pulmonary insufficiency and tricuspid regurgitation.  He was also found to have mitral regurgitation.  He was also found to have trace pericardial effusion.  He is a heavy former smoker.  Unfortunately, he continues to vape a product with nicotine.  He does have CKD with proteinuria and this has been attributed as a cause of his lower extremity edema.  He remains on Bumex with nephrology.      The following portions of the patient's history were reviewed and updated as appropriate: allergies, current medications, past family history, past medical history, past social history, past surgical history and problem list.      Review of Systems - Review of Systems   Cardiovascular: Positive for dyspnea on exertion and leg swelling. Negative for chest pain, claudication, cyanosis, irregular heartbeat, near-syncope, orthopnea, palpitations,  paroxysmal nocturnal dyspnea and syncope.   Respiratory: Positive for shortness of breath. Negative for cough, hemoptysis and sleep disturbances due to breathing.         All other systems were reviewed and were negative.    family history includes Heart disease in an other family member.     reports that he has been smoking electronic cigarette.  He has never used smokeless tobacco. He reports that he does not drink alcohol or use drugs.    Allergies   Allergen Reactions   • Dopamine Dizziness     Dropped BP          Current Outpatient Medications:   •  amLODIPine (NORVASC) 5 MG tablet, Take 5 mg by mouth Daily., Disp: , Rfl:   •  aspirin 325 MG tablet, Take 325 mg by mouth Daily., Disp: , Rfl:   •  atorvastatin (LIPITOR) 20 MG tablet, Take 20 mg by mouth Every Night., Disp: , Rfl:   •  bumetanide (BUMEX) 2 MG tablet, Take 2 mg by mouth Daily., Disp: , Rfl:   •  Cholecalciferol (VITAMIN D3) 1000 units capsule, Take  by mouth., Disp: , Rfl:   •  cilostazol (PLETAL) 100 MG tablet, TAKE ONE TABLET BY MOUTH TWICE DAILY, Disp: 60 tablet, Rfl: 5  •  clonazePAM (KlonoPIN) 1 MG tablet, 0.5 mg At Night As Needed., Disp: , Rfl:   •  CloNIDine (CATAPRES) 0.1 MG tablet, Take 0.1 mg by mouth every night at bedtime. Take 1 tablet by mouth nightly for bp over 150, Disp: , Rfl:   •  escitalopram (LEXAPRO) 20 MG tablet, Take 10 mg by mouth Daily. 3 tablets by mouth daily, Disp: , Rfl:   •  gabapentin (NEURONTIN) 100 MG capsule, Take 100 mg by mouth. 1 am 2 pm, Disp: , Rfl:   •  lisinopril (PRINIVIL,ZESTRIL) 10 MG tablet, Take 20 mg by mouth Daily., Disp: , Rfl:   •  nitroglycerin (NITROSTAT) 0.4 MG SL tablet, Place 0.4 mg under the tongue Every 5 (Five) Minutes As Needed for Chest Pain. Take no more than 3 doses in 15 minutes., Disp: , Rfl:   •  omega-3 acid ethyl esters (LOVAZA) 1 G capsule, Take 2 g by mouth 2 (Two) Times a Day., Disp: , Rfl:   •  primidone (MYSOLINE) 250 MG tablet, Take  by mouth 2 (Two) Times a Day., Disp: ,  "Rfl:   •  propranolol LA (INDERAL LA) 160 MG 24 hr capsule, 160 mg Daily., Disp: , Rfl:   •  ranitidine (ZANTAC) 150 MG tablet, Take 150 mg by mouth Every Night., Disp: , Rfl:   •  rOPINIRole (REQUIP) 1 MG tablet, Take 2 mg by mouth Every Night., Disp: , Rfl:       Physical Exam:  Vitals:    10/04/19 1351   BP: 140/72   BP Location: Left arm   Patient Position: Sitting   Cuff Size: Adult   Pulse: 56   SpO2: 99%   Weight: 101 kg (223 lb 4.8 oz)   Height: 180.3 cm (71\")   PainSc: 0-No pain     Pulse Ox: Normal  on room air  General: alert, appears stated age and cooperative     Body Habitus: obese    HEENT: Head: Normocephalic, no lesions, without obvious abnormality. No arcus senilis, xanthelasma or xanthomas.    JVP: Volume/Pulsation: Normal.  Normal waveforms.   Appropriate inspiratory decrease.  No Kussmaul's. No Janis's.     Precordium: Normal impulses. P2 is not palpable.  RV Heave: absent  LV Heave: absent  Lumpkin:  normal size and placement  Palpable S4: absent.  Heart rate: bradycardic    Heart Rhythm: regular     Heart Sounds: S1: normal intensity  S2: normal intensity  S3: absent   S4: absent  Opening Snap: absent    A2-OS:  no  Pericardial Rub:  Absent   Ejection click: None      Murmurs:  absent   Extremity: moves all extremities equally.       DATA REVIEWED:     Results for orders placed in visit on 09/06/19   Adult Transthoracic Echo Complete W/ Cont if Necessary Per Protocol    Narrative · The left ventricle systolic function is mildly decreased. Estimated to   be in the range of 41-45%. Mild global hypokinesis. Pseudo-normal   diastolic dysfunction.  · Right ventricle systolic function is normal. Moderate right ventricular   hypertrophy.  · Mild mitral regurgitation.  · Mild tricuspid regurgitation. PA systolic pressure is at least 45 mmHg.   Mild pulmonary hypertension.  · Dilated pulmonary annulus with mild to moderate pulmonary regurgitation.  · Trace pericardial effusion adjacent to the right " ventricle.                   Lab Results   Component Value Date    HGBA1C 6.7 (H) 07/15/2019     No results found for: DDIMER  No results found for: ALT  Lab Results   Component Value Date    HGBA1C 6.7 (H) 07/15/2019    HGBA1C 7.1 (H) 04/17/2019       Assessment/Plan      1. Pulmonary hypertension (CMS/HCC). PAH/PVH. WHO Group 3; FC: III.  RV status: Abnormal:.   Patient appears euvolemic. Perfusion status: good.  Last 6MWT: Pending.  I discussed with him today that he likely has a pulmonary etiology for his PAH, likely COPD.  I recommended a thorough pulmonary work-up.  Also possible that he has an element of HFbEF: His LVEF is 41-45%.  He likely is going to require an ischemia evaluation, as well as a possible RHC, but I would like to clarify his pulmonary status first.  -Discussed evaluation, treatment and usual course.  -No current indication for PAH-specific medications  -No compelling indication at this time for RHC  -Will continue active clinical surveillance.  Signs and symptoms of worsening PAH and RV failure were discussed.  -I have asked the patient that if they were to move to be certain they see someone with expertise in PAH. These providers can be located at [email or web address].  -PFTs, 6MWT, Pulm referral    2. Nonrheumatic mitral valve regurgitation/Nonrheumatic tricuspid valve regurgitation/Nonrheumatic pulmonary valve insufficiency. ACC stage B.  There are no surgical indications at this time.  · The patient has been advised to remain in clinical surveillance every 6 months.  · Signs and symptoms of worsening valve disease discussed.  I've asked the patient contact me for an earlier appointment if these develop.  · I've recommended a repeat 2D TTE every 1 year.  · TTE due: 2020.  No indication based on 2017 ACC/AHA guidelines for IE prophylaxis for dental procedures: Optimal oral health is recommended through regular professional dental care and the use of appropriate dental products, such as  manual, powered, and ultrasonic toothbrushes; dental floss; and other plaque-removal devices    3. Coronary artery disease involving native coronary artery of native heart without angina pectoris. No anginal symptoms. The patient has been revascularized.  Revascularization:  CABG.   -Propranolol  -ASA--DOSE reduce 81mg  -Lipitor (atorvastatin)  -SL nitro prn  -Call 911 immediately for concerning symptoms.    4.  Pericardial effusion.The prevalence of pericardial effusion is about 3%.  The patient is currently Asymptomatic.  The size of the effusion is small.  I briefly discussed the etiology of pericardial effusions.  A handout was also offered to the patient which explained the disease process, including signs of worsening pericardial effusion.  At this point I have counseled conservative management.   -Will repeat a 2D TTE in 6 months  -Please call for worsening signs or symptoms.      5. Cardiac Risk Assessments based on 2019 ACCF guidelines:  · A team-based care approach is recommended for the control of risk factors associated with ASCAD.  As such, Conrado Cazares was requested to have ongoing follow-up with their PCP.  · Continue follow-up visits with PCP for monitoring of labs; diet emphasizing intake of vegetables, fruits, nuts, whole grains and fish is recommended.  · Physical activity recommendations were provided.  · Essential HTN is a significant risk factor for stroke, heart disease and vascular disease. I've recommended the patient continue current medications, if any, as prescribed by the primary care provider. I recommended they have close follow-up for ongoing mgmt of this and the medical comorbidities associated with HTN with their PCP.  They were also provided with information regarding maintaining a healthy weight, heart-healthy dietary pattern DASH information.  Goal blood pressure less than 130/80.   · The patient's BMI is recommended to be calculated at least annually.  The patient's BMI is  Body mass index is 31.14 kg/m²..  This places the patient in weight class:  Obese Class I: 30-34.9kg/m2.   Hand out, increase aerobic activity  cut out extra servings, decrease soda or juice intake, eat breakfast, eat more fruits and vegetables, family to eat at dinner table more often, have 3 meals a day, increase physical activity, increase water intake, keep TV off during meals, plan meals, reduce fast food intake, reduce portion size and reduce screen time; close PCP follow-up.  Conrado Cazares is a current electronic cigarettes user.   I have educated him on the risk of diseases from using tobacco products such as cancer, COPD and heart diease.  He is pre-contemplative.  Time: 3 minutes.      Return in about 3 months (around 1/4/2020).

## 2019-10-04 NOTE — PATIENT INSTRUCTIONS
Mitral Valve Regurgitation    Mitral valve regurgitation, also called mitral regurgitation, is a condition in which blood leaks from the mitral valve in the heart. The mitral valve is located between the upper left chamber (left atrium) and the lower left chamber (left ventricle) of the heart. Normally, this valve opens when the atrium pumps blood into the ventricle, and it closes when the ventricle pumps blood out to the body.  Mitral valve regurgitation happens when the mitral valve does not close properly. As a result, blood in the ventricle leaks back into the atrium. Mitral valve regurgitation causes the heart to work harder to pump blood. If the condition is mild, a person may not have symptoms. However, over time, this can lead to heart failure.  What are the causes?  This condition may be caused by:  · A condition in which the mitral valves do not close completely when the heart pumps blood (mitral valve prolapse).  · Infection, such as endocarditis or rheumatic fever.  · Damage to the mitral valve, such as from injury (trauma) to the heart, a problem present at birth (birth defect), or a heart attack.  · Certain medicines.  What increases the risk?  This condition is more likely to develop in people who have:  · Certain forms of heart disease.  · A family history of heart valve disease.  · Certain conditions that are present at birth (congenital).  You are also more likely to develop this condition if you have taken certain diet pills in the past.  What are the signs or symptoms?  Symptoms of this condition include:  · Shortness of breath with physical activity, like climbing stairs.  · Fast or irregular heartbeat.  · Cough.  · Suddenly waking up at night with difficulty breathing or needing to urinate.  · Heavy breathing.  · Extreme tiredness.  · Swelling in the lower legs, ankles, and feet.  In some cases of mild to moderate mitral regurgitation, there are no symptoms.  How is this diagnosed?  This  condition may be diagnosed based on the results of a physical exam. Your health care provider will listen to your heart for an abnormal heart sound (murmur). You may also have other tests, including:  · An echocardiogram. This test creates ultrasound images of the heart that allow your health care provider to see how the heart valves work while your heart is beating.  · Chest X-ray.  · Electrocardiogram (ECG). This is a test that records the electrical impulses of the heart.  · Cardiac catheterization. This test is used to look at the structure and function of the heart. A thin tube (catheter) is passed through the blood vessels and into the heart. Dye is injected into the blood vessels so the cardiac system can be seen on images that are taken.  How is this treated?  This condition may be treated with:  · Medicines. These may be given to treat symptoms and prevent complications.  · Surgery to repair or replace the mitral valve in severe, long-term (chronic) cases.  Follow these instructions at home:    Lifestyle  · Limit alcohol intake to no more than 1 drink a day for nonpregnant women and 2 drinks a day for men. One drink equals 12 oz of beer, 5 oz of wine, or 1½ oz of hard liquor.  · Do not use any products that contain nicotine or tobacco, such as cigarettes and e-cigarettes. If you need help quitting, ask your health care provider.  · Eat a heart-healthy diet that includes plenty of fresh fruits and vegetables, whole grains, low-fat (lean) protein, and low-fat dairy products. Consider working with a diet and nutrition specialist (dietitian) to help you make healthy food choices.  · Limit the amount of salt (sodium) in your diet. Avoid adding salt to foods, and avoid foods that are high in salt, such as:  ? Pickles.  ? Smoked and cured meats.  ? Processed foods.  · Maintain a healthy weight and stay physically active. Ask your health care provider to recommend activities that are safe for you.  · Try to get 7  or more hours of sleep each night.  · Find ways to manage stress. If you need help with this, ask your health care provider.  General instructions  · Take over-the-counter and prescription medicines only as told by your health care provider.  · Work closely with your health care provider to manage any other health conditions you have, such as diabetes or high blood pressure.  · If you plan to become pregnant, talk with your health care provider first.  · Keep all follow-up visits as told by your health care provider. This is important.  Contact a health care provider if:  · You have a fever.  · You feel more tired than usual when doing physical activity.  · You have a dry cough.  Get help right away if:  · You have shortness of breath.  · You develop chest pain.  · You have swelling in your hands, feet, ankles, or abdomen that is getting worse.  · You have trouble staying awake or you faint.  · You feel dizzy or unsteady.  · You suddenly gain weight.  · You feel confused.  · Any of your symptoms begin to get worse.  These symptoms may represent a serious problem that is an emergency. Do not wait to see if the symptoms will go away. Get medical help right away. Call your local emergency services (911 in the [place] Do not drive yourself to the hospital.  Summary  · Mitral valve regurgitation, also called mitral regurgitation, is a condition in which blood leaks from a valve between two chambers of the heart (mitral valve).  · Depending on how severe your condition is, you may be treated with medicines or surgery.  · Practice heart-healthy habits to manage this condition. These include limiting alcohol, avoiding nicotine and tobacco, and eating a balanced diet that is low in salt (sodium).  This information is not intended to replace advice given to you by your health care provider. Make sure you discuss any questions you have with your health care provider.  Document Released: 03/07/2006 Document Revised: 09/29/2017  Document Reviewed: 09/29/2017  [employer] Interactive Patient Education © 2019 [employer]  Pulmonary Hypertension  Pulmonary hypertension is a long-term (chronic) condition in which there is high blood pressure in the arteries in the lungs (pulmonary arteries). This condition occurs when pulmonary arteries become narrow and tight, making it harder for blood to flow through the lungs. This in turn makes the heart work harder to pump blood through the lungs, making it harder for you to breathe.  Over time, pulmonary hypertension can weaken and damage the heart muscle, specifically the right side of the heart. Pulmonary hypertension is a serious condition that can be life-threatening.  What are the causes?  This condition may be caused by different medical conditions. It can be categorized by cause into five groups:  · Group 1: Pulmonary hypertension that is caused by abnormal growth of small blood vessels in the lungs (pulmonary arterial hypertension). The abnormal blood vessel growth may have no known cause, or it may be:  ? Passed from parent to child (hereditary).  ? Caused by another disease, such as a connective tissue disease (including lupus or scleroderma), congenital heart disease, liver disease, or HIV.  ? Caused by certain medicines or poisons (toxins).  · Group 2: Pulmonary hypertension that is caused by weakness of the left chamber of the heart (left ventricle) or heart valve disease.  · Group 3: Pulmonary hypertension that is caused by lung disease or low oxygen levels. Causes in this group include:  ? Emphysema or chronic obstructive pulmonary disease (COPD).  ? Untreated sleep apnea.  ? Pulmonary fibrosis.  ? Long-term exposure to high altitudes in certain people who may already be at higher risk for pulmonary hypertension.  · Group 4: Pulmonary hypertension that is caused by blood clots in the lungs (pulmonary emboli).  · Group 5: Other causes of pulmonary hypertension, such as sickle cell anemia,  sarcoidosis, tumors pressing on the pulmonary arteries, and various other diseases.  What are the signs or symptoms?  Symptoms of this condition include:  · Shortness of breath. You may notice shortness of breath with:  ? Activity, such as walking.  ? Minimal activity, such as getting dressed.  ? No activity, like when you are sitting still.  · A cough. Sometimes, bloody mucus from the lungs may be coughed up (hemoptysis).  · Tiredness and fatigue.  · Dizziness, lightheadedness, or fainting, especially with physical activity.  · Rapid heartbeat, or feeling your heart flutter or skip a beat (palpitations).  · Veins in the neck getting larger.  · Swelling of the lower legs, abdomen, or both.  · Bluish color of the lips and fingertips.  · Chest pain or tightness in the chest.  · Abdominal pain, especially in the upper abdomen.  How is this diagnosed?  This condition may be diagnosed based on one or more of the following tests:  · Chest X-ray.  · Blood tests.  · CT scan.  · Pulmonary function test. This test measures how much air your lungs can hold. It also tests how well air moves in and out of your lungs.  · 6-minute walk test. This tests how severe your condition is in relation to your activity levels.  · Electrocardiogram (ECG). This test records the electrical impulses of the heart.  · Echocardiogram. This test uses sound waves (ultrasound) to produce an image of the heart.  · Cardiac catheterization. This is a procedure in which a thin tube (catheter) is passed into the pulmonary artery and used to test the pressure in your pulmonary artery and the right side of your heart.  · Lung biopsy. This involves having a procedure to remove a small sample of lung tissue for testing. This may help determine an underlying cause of your pulmonary hypertension.  How is this treated?  There is no cure for this condition, but treatment can help to relieve symptoms and slow the progress of the condition. Treatment may  include:  · Cardiac rehabilitation. This is a treatment program that includes exercise training, education, and counseling to help you get stronger and return to an active lifestyle.  · Oxygen therapy.  · Medicines that:  ? Lower blood pressure.  ? Relax (dilate) the pulmonary blood vessels.  ? Help the heart beat more efficiently and pump more blood.  ? Help the body get rid of extra fluid (diuretics).  ? Thin the blood in order to prevent blood clots in the lungs.  · Lung surgery to relieve pressure on the heart, for severe cases that do not respond to medical treatment.  · Heart-lung transplant, or lung transplant. This may be done in very severe cases.  Follow these instructions at home:  Eating and drinking    · Eat a healthy diet that includes plenty of fresh fruits and vegetables, whole grains, and beans.  · Limit your salt (sodium) intake to less than 2,300 mg a day.  Lifestyle  · Do not use any products that contain nicotine or tobacco, such as cigarettes and e-cigarettes. If you need help quitting, ask your health care provider.  · Avoid secondhand smoke.  Activity  · Get plenty of rest.  · Exercise as directed. Talk with your health care provider about what type of exercise is safe for you.  · Avoid hot tubs and saunas.  · Avoid high altitudes.  General instructions  · Take over-the-counter and prescription medicines only as told by your health care provider. Do not change or stop medicines without checking with your health care provider.  · Stay up to date on your vaccines, especially yearly flu (influenza) and pneumonia vaccines.  · If you are a woman of child-bearing age, avoid becoming pregnant. Talk with your health care provider about birth control.  · Consider ways to get support for anxiety and stress of living with pulmonary hypertension. Talk with your health care provider about support groups and online resources.  · Use oxygen therapy at home as directed.  · Keep track of your weight. Weight  gain could be a sign that your condition is getting worse.  · Keep all follow-up visits as told by your health care provider. This is important.  Contact a health care provider if:  · Your cough gets worse.  · You have more shortness of breath than usual, or you start to have trouble doing activities that you could do before.  · You need to use medicines or oxygen more frequently or in higher dosages than usual.  Get help right away if:  · You have severe shortness of breath.  · You have chest pain or pressure.  · You cough up blood.  · You have swelling of your feet or legs that gets worse.  · You have rapid weight gain over a period of 1-2 days.  · Your medicines or oxygen do not provide relief.  Summary  · Pulmonary hypertension is a chronic condition in which there is high blood pressure in the arteries in the lungs (pulmonary arteries).  · Pulmonary hypertension is a serious condition that can be life-threatening. It can be caused by a variety of illnesses.  · Treatment may involve taking medicines and using oxygen therapy. Severe cases may require surgery or a transplant.  This information is not intended to replace advice given to you by your health care provider. Make sure you discuss any questions you have with your health care provider.  Document Released: 10/14/2008 Document Revised: 03/13/2018 Document Reviewed: 03/13/2018  [employer] Interactive Patient Education © 2019 [employer]  Steps to Quit Smoking    Smoking tobacco can be harmful to your health and can affect almost every organ in your body. Smoking puts you, and those around you, at risk for developing many serious chronic diseases. Quitting smoking is difficult, but it is one of the best things that you can do for your health. It is never too late to quit.  What are the benefits of quitting smoking?  When you quit smoking, you lower your risk of developing serious diseases and conditions, such as:  · Lung cancer or lung disease, such as  COPD.  · Heart disease.  · Stroke.  · Heart attack.  · Infertility.  · Osteoporosis and bone fractures.  Additionally, symptoms such as coughing, wheezing, and shortness of breath may get better when you quit. You may also find that you get sick less often because your body is stronger at fighting off colds and infections. If you are pregnant, quitting smoking can help to reduce your chances of having a baby of low birth weight.  How do I get ready to quit?  When you decide to quit smoking, create a plan to make sure that you are successful. Before you quit:  · Pick a date to quit. Set a date within the next two weeks to give you time to prepare.  · Write down the reasons why you are quitting. Keep this list in places where you will see it often, such as on your bathroom mirror or in your car or wallet.  · Identify the people, places, things, and activities that make you want to smoke (triggers) and avoid them. Make sure to take these actions:  ? Throw away all cigarettes at home, at work, and in your car.  ? Throw away smoking accessories, such as ashtrays and lighters.  ? Clean your car and make sure to empty the ashtray.  ? Clean your home, including curtains and carpets.  · Tell your family, friends, and coworkers that you are quitting. Support from your loved ones can make quitting easier.  · Talk with your health care provider about your options for quitting smoking.  · Find out what treatment options are covered by your health insurance.  What strategies can I use to quit smoking?  Talk with your healthcare provider about different strategies to quit smoking. Some strategies include:  · Quitting smoking altogether instead of gradually lessening how much you smoke over a period of time. Research shows that quitting “cold turkey” is more successful than gradually quitting.  · Attending in-person counseling to help you build problem-solving skills. You are more likely to have success in quitting if you attend  several counseling sessions. Even short sessions of 10 minutes can be effective.  · Finding resources and support systems that can help you to quit smoking and remain smoke-free after you quit. These resources are most helpful when you use them often. They can include:  ? Online chats with a counselor.  ? Telephone quitlines.  ? Printed self-help materials.  ? Support groups or group counseling.  ? Text messaging programs.  ? Mobile phone applications.  · Taking medicines to help you quit smoking. (If you are pregnant or breastfeeding, talk with your health care provider first.) Some medicines contain nicotine and some do not. Both types of medicines help with cravings, but the medicines that include nicotine help to relieve withdrawal symptoms. Your health care provider may recommend:  ? Nicotine patches, gum, or lozenges.  ? Nicotine inhalers or sprays.  ? Non-nicotine medicine that is taken by mouth.  Talk with your health care provider about combining strategies, such as taking medicines while you are also receiving in-person counseling. Using these two strategies together makes you more likely to succeed in quitting than if you used either strategy on its own.  If you are pregnant or breastfeeding, talk with your health care provider about finding counseling or other support strategies to quit smoking. Do not take medicine to help you quit smoking unless told to do so by your health care provider.  What things can I do to make it easier to quit?  Quitting smoking might feel overwhelming at first, but there is a lot that you can do to make it easier. Take these important actions:  · Reach out to your family and friends and ask that they support and encourage you during this time. Call telephone quitlines, reach out to support groups, or work with a counselor for support.  · Ask people who smoke to avoid smoking around you.  · Avoid places that trigger you to smoke, such as bars, parties, or smoke-break areas at  work.  · Spend time around people who do not smoke.  · Lessen stress in your life, because stress can be a smoking trigger for some people. To lessen stress, try:  ? Exercising regularly.  ? Deep-breathing exercises.  ? Yoga.  ? Meditating.  ? Performing a body scan. This involves closing your eyes, scanning your body from head to toe, and noticing which parts of your body are particularly tense. Purposefully relax the muscles in those areas.  · Download or purchase mobile phone or tablet apps (applications) that can help you stick to your quit plan by providing reminders, tips, and encouragement. There are many free apps, such as QuitGuide from the CDC (Centers for Disease Control and Prevention). You can find other support for quitting smoking (smoking cessation) through smokefree.gov and other websites.  How will I feel when I quit smoking?  Within the first 24 hours of quitting smoking, you may start to feel some withdrawal symptoms. These symptoms are usually most noticeable 2-3 days after quitting, but they usually do not last beyond 2-3 weeks. Changes or symptoms that you might experience include:  · Mood swings.  · Restlessness, anxiety, or irritation.  · Difficulty concentrating.  · Dizziness.  · Strong cravings for sugary foods in addition to nicotine.  · Mild weight gain.  · Constipation.  · Nausea.  · Coughing or a sore throat.  · Changes in how your medicines work in your body.  · A depressed mood.  · Difficulty sleeping (insomnia).  After the first 2-3 weeks of quitting, you may start to notice more positive results, such as:  · Improved sense of smell and taste.  · Decreased coughing and sore throat.  · Slower heart rate.  · Lower blood pressure.  · Clearer skin.  · The ability to breathe more easily.  · Fewer sick days.  Quitting smoking is very challenging for most people. Do not get discouraged if you are not successful the first time. Some people need to make many attempts to quit before they  achieve long-term success. Do your best to stick to your quit plan, and talk with your health care provider if you have any questions or concerns.  This information is not intended to replace advice given to you by your health care provider. Make sure you discuss any questions you have with your health care provider.  Document Released: 12/12/2002 Document Revised: 07/24/2018 Document Reviewed: 05/03/2016  [employer] Interactive Patient Education © 2019 [employer]

## 2019-10-14 PROBLEM — R91.1 LUNG NODULE: Status: ACTIVE | Noted: 2019-10-14

## 2019-10-14 NOTE — PROGRESS NOTES
Pulmonary Consultation    Vinay Browning,*,    Thank you for asking me to see Conrado Cazares for   Chief Complaint   Patient presents with   • Pulmonary hypertension   .    Subjective     History of Present Illness  Conrado Cazares is a 69 y.o. male with a PMH significant for past tobacco use, obesity, pulmonary hypertension, HALIMA intolerant of CPAP, ASCAD, hypertension, hyperlipidemia, and PVD who presents for evaluation of pulmonary hypertension. Pt states he was referred by Dr. Browning after he recently established care for his CAD. He denies prior diagnosis of PH, but he reports being told that he had COPD by his former PCP. Pt admits to CIFUENTES with yard work but not with daily activities. His dyspnea is relieved with rest. He is not on any inhalers. Pt admits to some cough intermittently productive of white sputum and nighttime wheeze, but he denies chest pain. He denies wt changes but he does have chronic ankle swelling. His BP is elevated today but he states he has not taken his medications yet. Pt reports his BP is usually 105-130/ 55-60 at bedtime. He admits to snoring, EDS, and witnessed apneas. He was diagnosed with HALIMA recenlty but he could not tolerate a CPAP. He stopped smoking cigarettes in  but he continues to use the e-cigarette. 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 His father  of metastatic cancer (unknown primary), mother  of a CVA, 2 brothers  of alcoholism, and paternal grandmother had DM.      Tobacco use history:  Type: cigarettes and electronic cigarettes  Amount: 2 ppd  Duration: 50 years  Cessation: 3/2014; still using e-cigarette  Willing to quit: Yes      Review of Systems: History obtained from chart review and the patient.  Review of Systems   Constitutional: Positive for appetite change and fatigue.        Snoring, witnessed apneas, EDS   HENT: Positive for congestion, hearing loss and  postnasal drip.    Eyes: Positive for visual disturbance.   Respiratory: Positive for cough, shortness of breath and wheezing.    Cardiovascular: Positive for palpitations and leg swelling.   Gastrointestinal: Negative for abdominal pain.   Musculoskeletal: Negative for arthralgias and back pain.   Psychiatric/Behavioral: Positive for dysphoric mood. The patient is nervous/anxious.      As described in the HPI. Otherwise, remainder of ROS (14 systems) were negative.    Patient Active Problem List   Diagnosis   • PVD (peripheral vascular disease) (CMS/HCC)   • Essential hypertension   • Mixed hyperlipidemia   • Reactive depression   • Neuropathy   • Left carotid bruit   • Electronic cigarette use   • Encounter for screening for lung cancer   • Stopped smoking with greater than 40 pack year history   • Class 1 obesity due to excess calories with serious comorbidity and body mass index (BMI) of 31.0 to 31.9 in adult   • Pulmonary hypertension (CMS/HCC)   • Nonrheumatic mitral valve regurgitation   • Nonrheumatic tricuspid valve regurgitation   • Nonrheumatic pulmonary valve insufficiency   • Coronary artery disease involving native coronary artery of native heart without angina pectoris   • Pericardial effusion   • Lung nodule   • HALIMA (obstructive sleep apnea)         Current Outpatient Medications:   •  amLODIPine (NORVASC) 5 MG tablet, Take 5 mg by mouth Daily., Disp: , Rfl:   •  aspirin 325 MG tablet, Take 325 mg by mouth Daily., Disp: , Rfl:   •  atorvastatin (LIPITOR) 20 MG tablet, Take 20 mg by mouth Every Night., Disp: , Rfl:   •  bumetanide (BUMEX) 2 MG tablet, Take 2 mg by mouth Daily., Disp: , Rfl:   •  Cholecalciferol (VITAMIN D3) 1000 units capsule, Take  by mouth., Disp: , Rfl:   •  Cholecalciferol (VITAMIN D3) 1000 units capsule, Take 1 capsule by mouth Daily., Disp: , Rfl:   •  cilostazol (PLETAL) 100 MG tablet, TAKE ONE TABLET BY MOUTH TWICE DAILY, Disp: 60 tablet, Rfl: 5  •  clonazePAM (KlonoPIN) 1 MG  tablet, 0.5 mg At Night As Needed., Disp: , Rfl:   •  CloNIDine (CATAPRES) 0.1 MG tablet, Take 0.1 mg by mouth every night at bedtime. Take 1 tablet by mouth nightly for bp over 150, Disp: , Rfl:   •  escitalopram (LEXAPRO) 20 MG tablet, Take 10 mg by mouth Daily. 3 tablets by mouth daily, Disp: , Rfl:   •  gabapentin (NEURONTIN) 100 MG capsule, Take 100 mg by mouth. 1 am 2 pm, Disp: , Rfl:   •  hydrALAZINE (APRESOLINE) 25 MG tablet, Take 25 mg by mouth., Disp: , Rfl:   •  lisinopril (PRINIVIL,ZESTRIL) 10 MG tablet, Take 20 mg by mouth Daily., Disp: , Rfl:   •  nitroglycerin (NITROSTAT) 0.4 MG SL tablet, Place 0.4 mg under the tongue Every 5 (Five) Minutes As Needed for Chest Pain. Take no more than 3 doses in 15 minutes., Disp: , Rfl:   •  omega-3 acid ethyl esters (LOVAZA) 1 G capsule, Take 2 g by mouth 2 (Two) Times a Day., Disp: , Rfl:   •  primidone (MYSOLINE) 250 MG tablet, Take  by mouth 2 (Two) Times a Day., Disp: , Rfl:   •  propranolol LA (INDERAL LA) 160 MG 24 hr capsule, 160 mg Daily., Disp: , Rfl:   •  ranitidine (ZANTAC) 150 MG tablet, Take 150 mg by mouth Every Night., Disp: , Rfl:   •  rOPINIRole (REQUIP) 1 MG tablet, Take 2 mg by mouth Every Night., Disp: , Rfl:   •  albuterol sulfate  (90 Base) MCG/ACT inhaler, Inhale 2 puffs Every 4 (Four) Hours As Needed for Wheezing or Shortness of Air., Disp: 18 g, Rfl: 11    Allergies   Allergen Reactions   • Dopamine Dizziness     Dropped BP        Past Medical History:   Diagnosis Date   • Dyslipidemia    • Essential (primary) hypertension    • Essential hypertension    • Hyperlipidemia    • Nicotine dependence      other tobacco product, uncomplicated - vapor cig      • Other atherosclerosis of native arteries of extremities, bilateral legs (CMS/HCC)     with intermittent claudication   • Tobacco dependence syndrome      Past Surgical History:   Procedure Laterality Date   • CARDIAC CATHETERIZATION     • CORONARY ARTERY BYPASS GRAFT     • GALLBLADDER  "SURGERY       Social History     Socioeconomic History   • Marital status:      Spouse name: Not on file   • Number of children: Not on file   • Years of education: Not on file   • Highest education level: Not on file   Tobacco Use   • Smoking status: Current Every Day Smoker     Types: Electronic Cigarette   • Smokeless tobacco: Never Used   Substance and Sexual Activity   • Alcohol use: No   • Drug use: No     Family History   Problem Relation Age of Onset   • Heart disease Other           Objective     Blood pressure (!) 181/67, pulse 60, height 180.3 cm (71\"), weight 103 kg (228 lb), SpO2 98 %.  Physical Exam   Constitutional: He is oriented to person, place, and time. Vital signs are normal. He appears well-developed and well-nourished.   Obese   HENT:   Head: Normocephalic and atraumatic.   Nose: Nose normal.   Mouth/Throat: Uvula is midline, oropharynx is clear and moist and mucous membranes are normal.   Mallampati 4   Eyes: Conjunctivae, EOM and lids are normal. Pupils are equal, round, and reactive to light.   Eyeglasses   Neck: Trachea normal and normal range of motion. No tracheal tenderness present. No thyroid mass present.   Cardiovascular: Normal rate, regular rhythm and normal heart sounds. PMI is not displaced. Exam reveals no gallop.   No murmur heard.  Pulmonary/Chest: Effort normal and breath sounds normal. No respiratory distress. He has no decreased breath sounds. He has no wheezes. He has no rhonchi. Chest wall is not dull to percussion. He exhibits no tenderness.   Abdominal: Soft. Normal appearance and bowel sounds are normal. There is no hepatomegaly. There is no tenderness.   Musculoskeletal:   Normal gait, 1+ BLE pitting edema     Vascular Status -  His right foot exhibits abnormal foot edema. His left foot exhibits abnormal foot edema.  Lymphadenopathy:        Head (right side): No submandibular adenopathy present.        Head (left side): No submandibular adenopathy present.     " He has no cervical adenopathy.        Right: No supraclavicular adenopathy present.        Left: No supraclavicular adenopathy present.   Neurological: He is alert and oriented to person, place, and time.   Skin: Skin is warm and dry. No rash noted. No cyanosis. Nails show no clubbing.   Psychiatric: He has a normal mood and affect. His speech is normal and behavior is normal. Judgment normal.   Nursing note and vitals reviewed.      Radiology (independently reviewed and interpreted by me): LD CT 8/6/2019 showed 5 x 4 x 7 mm noncalcified nodule lateral right MIDDLE lobe, old granulomatous disease    HST 8/4/19:    TTE 9/13/19:  · The left ventricle systolic function is mildly decreased. Estimated to be in the range of 41-45%. Mild global hypokinesis. Pseudo-normal diastolic dysfunction.  · Right ventricle systolic function is normal. Moderate right ventricular hypertrophy.  · Mild mitral regurgitation.  · Mild tricuspid regurgitation. PA systolic pressure is at least 45 mmHg. Mild pulmonary hypertension.  · Dilated pulmonary annulus with mild to moderate pulmonary regurgitation.  · Trace pericardial effusion adjacent to the right ventricle.     Assessment/Plan     Conrado was seen today for pulmonary hypertension.    Diagnoses and all orders for this visit:    Pulmonary hypertension (CMS/HCC)    Lung nodule    HALIMA (obstructive sleep apnea)    Class 1 obesity due to excess calories with serious comorbidity and body mass index (BMI) of 31.0 to 31.9 in adult    Personal history of tobacco use, presenting hazards to health    Chronic obstructive pulmonary disease, unspecified COPD type (CMS/HCC)  -     albuterol sulfate  (90 Base) MCG/ACT inhaler; Inhale 2 puffs Every 4 (Four) Hours As Needed for Wheezing or Shortness of Air.    Essential hypertension         Discussion/ Recommendations:   Unfortunately, we were unable to perform PFTs today, but I do believe the patient has COPD based on clinical information.  I  personally reviewed his most recent lung cancer screening CT which did show a 7 mm nodule in the right MIDDLE lobe.  Based on the Fleischner guidelines, I would recommend a six-month follow-up CT. I offered initiation of a long-acting bronchodilator, but the patient feels like his symptoms are not frequent enough to warrant this.  He has elected to trial albuterol as needed to assess response as well as frequency of use.  I did strongly advised him to stop using the e-cigarette due to the risk of progressive lung disease as well as acute respiratory failure which has been documented recently.  I do think his dyspnea is multifactorial and we must address all the underlying causes.  Unfortunately, the patient could not tolerate using a CPAP which I think would benefit him.  His complaint of excessive daytime sleepiness is likely due to his untreated sleep apnea.  Given that he does not tolerate CPAP and has a high likelihood of having nocturnal desaturations, I have recommended undergoing overnight pulse oximetry to assess for nocturnal oxygen need.      -Start albuterol as needed for dyspnea or wheeze.  -Depending on frequency of use to the next visit, we may escalate to a long-acting bronchodilator.  -Repeat CT chest without contrast in 6 months (February 2020) to follow right middle lobe nodule.  -Full PFT prior to next appointment.  -Overnight pulse oximetry.  Order and clinic note faxed to [facility] in [place].  Depending on results patient may require nocturnal oxygen which will also be ordered through [facility] per patient preference.,   -Conrado Cazares is a current electronic cigarettes user.  He currently smokes 1 electronic cigarette per day for a duration of 50 years. I have educated him on the risk of diseases from using tobacco products such as cancer, COPD and heart diease. I advised him to quit and he is not willing to quit. I spent 3  minutes counseling the patient.  -Encouraged him to get the flu  vaccine soon.  Also, encouraged him to get both the Pneumovax and Prevnar 13 vaccinations.    Patient's Body mass index is 31.8 kg/m². BMI is above normal parameters. Recommendations include: exercise counseling.           Return in about 4 weeks (around 11/12/2019) for F/u COPD; Arrive 30 minutes early for PFT.      Thank you for allowing me to participate in the care of Conrado Cazares. Please do not hesitate to contact me with any questions.         This document has been electronically signed by Yolie Cazares MD on October 15, 2019 9:47 AM      Dictated using Dragon

## 2019-10-15 ENCOUNTER — OFFICE VISIT (OUTPATIENT)
Dept: PULMONOLOGY | Facility: CLINIC | Age: 69
End: 2019-10-15

## 2019-10-15 VITALS
SYSTOLIC BLOOD PRESSURE: 181 MMHG | OXYGEN SATURATION: 98 % | DIASTOLIC BLOOD PRESSURE: 67 MMHG | BODY MASS INDEX: 31.92 KG/M2 | WEIGHT: 228 LBS | HEIGHT: 71 IN | HEART RATE: 60 BPM

## 2019-10-15 DIAGNOSIS — I10 ESSENTIAL HYPERTENSION: ICD-10-CM

## 2019-10-15 DIAGNOSIS — R91.1 LUNG NODULE: ICD-10-CM

## 2019-10-15 DIAGNOSIS — J44.9 CHRONIC OBSTRUCTIVE PULMONARY DISEASE, UNSPECIFIED COPD TYPE (HCC): ICD-10-CM

## 2019-10-15 DIAGNOSIS — G47.33 OSA (OBSTRUCTIVE SLEEP APNEA): ICD-10-CM

## 2019-10-15 DIAGNOSIS — Z87.891 PERSONAL HISTORY OF TOBACCO USE, PRESENTING HAZARDS TO HEALTH: ICD-10-CM

## 2019-10-15 DIAGNOSIS — E66.09 CLASS 1 OBESITY DUE TO EXCESS CALORIES WITH SERIOUS COMORBIDITY AND BODY MASS INDEX (BMI) OF 31.0 TO 31.9 IN ADULT: ICD-10-CM

## 2019-10-15 DIAGNOSIS — I27.20 PULMONARY HYPERTENSION (HCC): Primary | ICD-10-CM

## 2019-10-15 PROCEDURE — 99204 OFFICE O/P NEW MOD 45 MIN: CPT | Performed by: INTERNAL MEDICINE

## 2019-10-15 RX ORDER — ALBUTEROL SULFATE 90 UG/1
2 AEROSOL, METERED RESPIRATORY (INHALATION) EVERY 4 HOURS PRN
Qty: 18 G | Refills: 11 | Status: SHIPPED | OUTPATIENT
Start: 2019-10-15 | End: 2020-09-23 | Stop reason: SDUPTHER

## 2019-10-15 RX ORDER — HYDRALAZINE HYDROCHLORIDE 25 MG/1
25 TABLET, FILM COATED ORAL DAILY PRN
COMMUNITY
Start: 2019-04-15 | End: 2020-06-23 | Stop reason: SDDI

## 2019-10-21 ENCOUNTER — TELEPHONE (OUTPATIENT)
Dept: PULMONOLOGY | Facility: CLINIC | Age: 69
End: 2019-10-21

## 2019-10-21 RX ORDER — ATORVASTATIN CALCIUM 20 MG/1
20 TABLET, FILM COATED ORAL NIGHTLY
Qty: 30 TABLET | Refills: 6 | Status: SHIPPED | OUTPATIENT
Start: 2019-10-21 | End: 2020-04-20 | Stop reason: SDUPTHER

## 2019-10-21 NOTE — TELEPHONE ENCOUNTER
I called Mr. Cazares and explained that I have spoken to Divya and  Dr. Cazares does not give surgical clearance so her last office note was faxed.  Divya was ok with this.            ----- Message from Halle Felton sent at 10/21/2019  2:49 PM CDT -----  Contact: [phone]  You faxed a copy of her last note to the surgery center at [facility]. He is needing a colonoscopy.  They need something that states that his cleared from a pulmonary stand faxed to them      Fax [phone]  Attn Divya

## 2019-11-14 PROBLEM — Z87.891 PERSONAL HISTORY OF TOBACCO USE, PRESENTING HAZARDS TO HEALTH: Status: ACTIVE | Noted: 2019-11-14

## 2019-11-14 PROBLEM — J44.9 CHRONIC OBSTRUCTIVE PULMONARY DISEASE: Status: ACTIVE | Noted: 2019-11-14

## 2019-11-14 PROBLEM — G47.34 NOCTURNAL HYPOXEMIA: Status: ACTIVE | Noted: 2019-11-14

## 2019-11-14 NOTE — PROGRESS NOTES
Pulmonary Office Follow-up    Subjective     Conrado Cazares is seen today at the office for   Chief Complaint   Patient presents with   • COPD   .    Subjective     History of Present Illness  Conrado Cazares is a 69 y.o. male with a PMH significant for COPD, past tobacco use, obesity, pulmonary hypertension, HALIMA intolerant of CPAP, ASCAD, hypertension, hyperlipidemia, and PVD who presents for follow-up of COPD.     10/15/19: Unfortunately, we were not able to perform PFTs so I recommended using albuterol only as needed and repeating a CT chest without contrast in February 2020 to follow the right lung nodule.  I also recommended he undergo overnight pulse oximetry to determine need for nocturnal oxygen and counseled him on the importance of complete tobacco cessation.      11/15/19: Pt states he has been congested with the weather changes. He admits to cough productive of white sputum as well as some CIFUENTES. Pt has tried the albuterol BID which helps. He does feel like his congestion is breaking up and he denies chest pain or fevers. Pt admits to some wheeze. His wife called her PCP and got abx for both of them, but he did not take it as he does not feel like he needed it. He also has promethazine but he has not used it. He has received his O2 from [facility] but he wants them to take the concentrator back as it is too loud. He wants to get a quieter one. Pt did get his flu and pneumonia shots.      Tobacco use history:  Type: cigarettes and electronic cigarettes  Amount: 2 ppd  Duration: 50 years  Cessation: 3/2014; still using e-cigarette  Willing to quit: Yes      Review of Systems: History obtained from chart review and the patient.  Review of Systems   Constitutional: Positive for fatigue. Negative for unexpected weight change.   HENT: Positive for congestion and hearing loss. Negative for postnasal drip.    Respiratory: Positive for cough, shortness of breath and wheezing.    Cardiovascular: Positive for leg  swelling.   Gastrointestinal: Negative for abdominal pain.   Musculoskeletal: Negative for arthralgias and back pain.     As described in the HPI. Otherwise, remainder of ROS (14 systems) were negative.    Patient Active Problem List   Diagnosis   • PVD (peripheral vascular disease) (CMS/HCC)   • Essential hypertension   • Mixed hyperlipidemia   • Reactive depression   • Neuropathy   • Left carotid bruit   • Electronic cigarette use   • Encounter for screening for lung cancer   • Stopped smoking with greater than 40 pack year history   • Class 1 obesity due to excess calories with serious comorbidity and body mass index (BMI) of 31.0 to 31.9 in adult   • Pulmonary hypertension (CMS/HCC)   • Nonrheumatic mitral valve regurgitation   • Nonrheumatic tricuspid valve regurgitation   • Nonrheumatic pulmonary valve insufficiency   • Coronary artery disease involving native coronary artery of native heart without angina pectoris   • Pericardial effusion   • Lung nodule   • HALIMA (obstructive sleep apnea)   • Stage 3 severe COPD by GOLD classification (CMS/[facility])   • Nocturnal hypoxemia   • Personal history of tobacco use, presenting hazards to health         Current Outpatient Medications:   •  albuterol sulfate  (90 Base) MCG/ACT inhaler, Inhale 2 puffs Every 4 (Four) Hours As Needed for Wheezing or Shortness of Air., Disp: 18 g, Rfl: 11  •  amLODIPine (NORVASC) 5 MG tablet, Take 5 mg by mouth Daily., Disp: , Rfl:   •  aspirin 325 MG tablet, Take 325 mg by mouth Daily., Disp: , Rfl:   •  atorvastatin (LIPITOR) 20 MG tablet, Take 1 tablet by mouth Every Night., Disp: 30 tablet, Rfl: 6  •  bumetanide (BUMEX) 2 MG tablet, Take 2 mg by mouth Daily., Disp: , Rfl:   •  Cholecalciferol (VITAMIN D3) 1000 units capsule, Take  by mouth., Disp: , Rfl:   •  cilostazol (PLETAL) 100 MG tablet, TAKE ONE TABLET BY MOUTH TWICE DAILY, Disp: 60 tablet, Rfl: 5  •  clonazePAM (KlonoPIN) 1 MG tablet, 0.5 mg At Night As Needed., Disp: , Rfl:    •  CloNIDine (CATAPRES) 0.1 MG tablet, Take 0.1 mg by mouth every night at bedtime. Take 1 tablet by mouth nightly for bp over 150, Disp: , Rfl:   •  escitalopram (LEXAPRO) 20 MG tablet, Take 10 mg by mouth Daily. 3 tablets by mouth daily, Disp: , Rfl:   •  gabapentin (NEURONTIN) 100 MG capsule, Take 100 mg by mouth. 1 am 2 pm, Disp: , Rfl:   •  hydrALAZINE (APRESOLINE) 25 MG tablet, Take 25 mg by mouth., Disp: , Rfl:   •  lisinopril (PRINIVIL,ZESTRIL) 10 MG tablet, Take 20 mg by mouth Daily., Disp: , Rfl:   •  nitroglycerin (NITROSTAT) 0.4 MG SL tablet, Place 0.4 mg under the tongue Every 5 (Five) Minutes As Needed for Chest Pain. Take no more than 3 doses in 15 minutes., Disp: , Rfl:   •  omega-3 acid ethyl esters (LOVAZA) 1 G capsule, Take 2 g by mouth 2 (Two) Times a Day., Disp: , Rfl:   •  pantoprazole (PROTONIX) 40 MG EC tablet, pantoprazole 40 mg tablet,delayed release  Take 1 tablet every day by oral route., Disp: , Rfl:   •  primidone (MYSOLINE) 250 MG tablet, Take  by mouth 2 (Two) Times a Day., Disp: , Rfl:   •  propranolol LA (INDERAL LA) 160 MG 24 hr capsule, 160 mg Daily., Disp: , Rfl:   •  rOPINIRole (REQUIP) 1 MG tablet, Take 2 mg by mouth Every Night., Disp: , Rfl:   •  Cholecalciferol (VITAMIN D3) 1000 units capsule, Take 1 capsule by mouth Daily., Disp: , Rfl:   •  umeclidinium-vilanterol (ANORO ELLIPTA) 62.5-25 MCG/INH aerosol powder  inhaler, Inhale 1 puff Daily., Disp: 1 each, Rfl: 11    Allergies   Allergen Reactions   • Dopamine Dizziness     Dropped BP        Past Medical History:   Diagnosis Date   • Dyslipidemia    • Essential (primary) hypertension    • Essential hypertension    • Hyperlipidemia    • Nicotine dependence      other tobacco product, uncomplicated - vapor cig      • Other atherosclerosis of native arteries of extremities, bilateral legs (CMS/HCC)     with intermittent claudication   • Tobacco dependence syndrome      Past Surgical History:   Procedure Laterality Date   •  "CARDIAC CATHETERIZATION     • CORONARY ARTERY BYPASS GRAFT     • GALLBLADDER SURGERY       Social History     Socioeconomic History   • Marital status:      Spouse name: Not on file   • Number of children: Not on file   • Years of education: Not on file   • Highest education level: Not on file   Tobacco Use   • Smoking status: Current Every Day Smoker     Types: Electronic Cigarette   • Smokeless tobacco: Never Used   Substance and Sexual Activity   • Alcohol use: No   • Drug use: No     Family History   Problem Relation Age of Onset   • Heart disease Other           Objective     Blood pressure (!) 199/81, pulse 68, height 180.3 cm (71\"), weight 102 kg (225 lb 4.8 oz), SpO2 97 %.  Physical Exam   Constitutional: He is oriented to person, place, and time. Vital signs are normal. He appears well-developed and well-nourished.   Obese   HENT:   Head: Normocephalic and atraumatic.   Nose: Nose normal.   Mouth/Throat: Uvula is midline, oropharynx is clear and moist and mucous membranes are normal.   Mallampati 4   Eyes: Conjunctivae, EOM and lids are normal. Pupils are equal, round, and reactive to light.   Eyeglasses   Neck: Trachea normal and normal range of motion. No tracheal tenderness present. No thyroid mass present.   Cardiovascular: Normal rate, regular rhythm and normal heart sounds. PMI is not displaced. Exam reveals no gallop.   No murmur heard.  Pulmonary/Chest: Effort normal and breath sounds normal. No respiratory distress. He has no decreased breath sounds. He has no wheezes. He has no rhonchi. He exhibits no tenderness.   Abdominal: Soft. Normal appearance and bowel sounds are normal. There is no hepatomegaly. There is no tenderness.   Musculoskeletal:   Normal gait, 1+ BLE pitting edema     Vascular Status -  His right foot exhibits abnormal foot edema. His left foot exhibits abnormal foot edema.  Lymphadenopathy:        Head (right side): No submandibular adenopathy present.        Head (left " side): No submandibular adenopathy present.     He has no cervical adenopathy.        Right: No supraclavicular adenopathy present.        Left: No supraclavicular adenopathy present.   Neurological: He is alert and oriented to person, place, and time.   Skin: Skin is warm and dry. No rash noted. No cyanosis. Nails show no clubbing.   Psychiatric: He has a normal mood and affect. His speech is normal and behavior is normal. Judgment normal.   Nursing note and vitals reviewed.    PFTs: 11/15/19 (independently reviewed and interpreted by me)  Ratio 61  FVC 2.37/ 50%  FEV1 1.43/ 41%  DLCO 17.62/ 52%  Severe obstruction with no significant bronchodilator response.  Moderately reduced diffusing capacity.  No comparative data available.       Radiology (independently reviewed and interpreted by me): LD CT 8/6/2019 showed 5 x 4 x 7 mm noncalcified nodule lateral right MIDDLE lobe, old granulomatous disease    TTE 9/13/19:  · The left ventricle systolic function is mildly decreased. Estimated to be in the range of 41-45%. Mild global hypokinesis. Pseudo-normal diastolic dysfunction.  · Right ventricle systolic function is normal. Moderate right ventricular hypertrophy.  · Mild mitral regurgitation.  · Mild tricuspid regurgitation. PA systolic pressure is at least 45 mmHg. Mild pulmonary hypertension.  · Dilated pulmonary annulus with mild to moderate pulmonary regurgitation.  · Trace pericardial effusion adjacent to the right ventricle.     Assessment/Plan     Conrado was seen today for copd.    Diagnoses and all orders for this visit:    Stage 3 severe COPD by GOLD classification (CMS/[facility])  -     Full Pulmonary Function Test With Bronchodilator; Future  -     umeclidinium-vilanterol (ANORO ELLIPTA) 62.5-25 MCG/INH aerosol powder  inhaler; Inhale 1 puff Daily.    Lung nodule  -     CT Chest Without Contrast; Future    Nocturnal hypoxemia    Personal history of tobacco use, presenting hazards to health    Electronic cigarette  use    Pulmonary hypertension (CMS/[facility])    Essential hypertension         Discussion/ Recommendations:   Given his symptoms and progressive albuterol use, I think he would benefit from a daily bronchodilator.  I did personally review his PFTs which show severe obstruction and I reviewed the results with the patient.  He is agreeable to initiating therapy at this time.  I also recommended that he contact [facility] regarding his home oxygen concentrator as he would benefit from nocturnal oxygen use based on his overnight pulse oximetry.  Unfortunately, the patient does continue to use a vapor cigarette and is not interested in quitting at this time.    -Start Anoro daily.  -Continue albuterol as needed for dyspnea or wheeze.  -Repeat CT chest without contrast in 6 months (February 2020) to follow right middle lobe nodule.  -2 L nocturnal oxygen ordered through [facility] following overnight pulse oximetry.  Order was faxed to company directly.  -Recommended he monitor his blood pressure at home and if it remains elevated he should discuss with his PCP.  -Conrado Cazares is a current electronic cigarettes user.  He currently smokes 1 electronic cigarette per day for a duration of 50 years. I have educated him on the risk of diseases from using tobacco products such as cancer, COPD and heart diease. I advised him to quit and he is not willing to quit. I spent 1 minutes counseling the patient.  -Up-to-date with Pneumovax 23 and flu vaccines.  Will get Prevnar 13 in 1 year.    Patient's Body mass index is 31.42 kg/m². BMI is above normal parameters. Recommendations include: exercise counseling.           Return in about 4 weeks (around 12/13/2019) for Recheck COPD.      Thank you for allowing me to participate in the care of Conrado Cazares. Please do not hesitate to contact me with any questions.         This document has been electronically signed by Yolie Cazares MD on November 15, 2019 12:03 PM      Dictated using  Skylar

## 2019-11-15 ENCOUNTER — OFFICE VISIT (OUTPATIENT)
Dept: PULMONOLOGY | Facility: CLINIC | Age: 69
End: 2019-11-15

## 2019-11-15 VITALS
OXYGEN SATURATION: 97 % | HEART RATE: 68 BPM | SYSTOLIC BLOOD PRESSURE: 199 MMHG | HEIGHT: 71 IN | DIASTOLIC BLOOD PRESSURE: 81 MMHG | WEIGHT: 225.3 LBS | BODY MASS INDEX: 31.54 KG/M2

## 2019-11-15 DIAGNOSIS — J44.9 CHRONIC OBSTRUCTIVE PULMONARY DISEASE, UNSPECIFIED COPD TYPE (HCC): Primary | ICD-10-CM

## 2019-11-15 DIAGNOSIS — R91.1 LUNG NODULE: ICD-10-CM

## 2019-11-15 DIAGNOSIS — Z87.891 PERSONAL HISTORY OF TOBACCO USE, PRESENTING HAZARDS TO HEALTH: ICD-10-CM

## 2019-11-15 DIAGNOSIS — Z78.9 ELECTRONIC CIGARETTE USE: ICD-10-CM

## 2019-11-15 DIAGNOSIS — I27.20 PULMONARY HYPERTENSION (HCC): ICD-10-CM

## 2019-11-15 DIAGNOSIS — J44.9 STAGE 3 SEVERE COPD BY GOLD CLASSIFICATION (HCC): Primary | ICD-10-CM

## 2019-11-15 DIAGNOSIS — G47.34 NOCTURNAL HYPOXEMIA: ICD-10-CM

## 2019-11-15 DIAGNOSIS — I10 ESSENTIAL HYPERTENSION: ICD-10-CM

## 2019-11-15 PROCEDURE — 99214 OFFICE O/P EST MOD 30 MIN: CPT | Performed by: INTERNAL MEDICINE

## 2019-11-15 PROCEDURE — 94060 EVALUATION OF WHEEZING: CPT | Performed by: INTERNAL MEDICINE

## 2019-11-15 PROCEDURE — 94729 DIFFUSING CAPACITY: CPT | Performed by: INTERNAL MEDICINE

## 2019-11-15 RX ORDER — PANTOPRAZOLE SODIUM 40 MG/1
40 TABLET, DELAYED RELEASE ORAL DAILY
COMMUNITY
Start: 2019-10-31

## 2019-11-16 LAB
BH CV ECHO MEAS - DIST AO DIAM: 2.07 CM
BH CV XLRA MEAS - MID AO DIAM: 167 CM
BH CV XLRA MEAS - PROX AO DIAM: 1.92 CM

## 2019-11-18 ENCOUNTER — TELEPHONE (OUTPATIENT)
Dept: PULMONOLOGY | Facility: CLINIC | Age: 69
End: 2019-11-18

## 2019-11-18 NOTE — TELEPHONE ENCOUNTER
I returned Mary call at [facility], she stated they were needing a rx for Mr. Cazares to d/c his albuterol inhaler and use a nebulizer machine.  I checked with Dr. Cazares, she did not order a nebulizer machine.  [facility] Uyen about Mr. Cazares.  I explained that he will need to contact us if he is wanting to stop using his inhaler and change to a nebulizer.  They stated they will contact the patient.

## 2019-12-12 NOTE — PROGRESS NOTES
Pulmonary Office Follow-up    Subjective     Conrado Cazares is seen today at the office for   Chief Complaint   Patient presents with   • COPD   .    Subjective     History of Present Illness  Conrado Cazares is a 69 y.o. male with a PMH significant for COPD, past tobacco use, obesity, pulmonary hypertension, HALIMA intolerant of CPAP, ASCAD, hypertension, hyperlipidemia, and PVD who presents for follow-up of COPD.     10/15/19: Unfortunately, we were not able to perform PFTs so I recommended using albuterol only as needed and repeating a CT chest without contrast in February 2020 to follow the right lung nodule.  I also recommended he undergo overnight pulse oximetry to determine need for nocturnal oxygen and counseled him on the importance of complete tobacco cessation.    11/15/19: He had daily albuterol use I recommended starting Anoro daily as well as initiating nocturnal oxygen.  I did spend time counseling the patient on the importance of not using the electronic cigarette due to risk of progressive lung disease.    12/13/19: Pt states he cannot tell if the Anoro is helping but he does admit that he has not been getting out of breath. He denies needing his albuterol. Pt denies cough, wheeze or chest pain. Pt is using the O2 but he is having some trouble being irritated with the NC. He reports his BP at home has been 130's/ 60's.  Patient continues to take his antihypertensives and is not having any headaches or vision changes.  Unfortunately, he does continue to use the e-cigarette but states that he is cut down.  He denies any recent antibiotics or steroid use.      Tobacco use history:  Type: cigarettes and electronic cigarettes  Amount: 2 ppd  Duration: 50 years  Cessation: 3/2014; still using e-cigarette  Willing to quit: Yes      Review of Systems: History obtained from chart review and the patient.  Review of Systems   Constitutional: Positive for fatigue. Negative for fever and unexpected weight  change.   HENT: Positive for hearing loss. Negative for congestion and postnasal drip.    Respiratory: Positive for shortness of breath. Negative for cough and wheezing.    Cardiovascular: Positive for leg swelling. Negative for chest pain.   Gastrointestinal: Negative for abdominal pain.   Musculoskeletal: Negative for arthralgias and back pain.     As described in the HPI. Otherwise, remainder of ROS (14 systems) were negative.    Patient Active Problem List   Diagnosis   • PVD (peripheral vascular disease) (CMS/[facility])   • Essential hypertension   • Mixed hyperlipidemia   • Reactive depression   • Neuropathy   • Left carotid bruit   • Electronic cigarette use   • Encounter for screening for lung cancer   • Stopped smoking with greater than 40 pack year history   • Class 1 obesity due to excess calories with serious comorbidity and body mass index (BMI) of 31.0 to 31.9 in adult   • Pulmonary hypertension (CMS/[facility])   • Nonrheumatic mitral valve regurgitation   • Nonrheumatic tricuspid valve regurgitation   • Nonrheumatic pulmonary valve insufficiency   • Coronary artery disease involving native coronary artery of native heart without angina pectoris   • Pericardial effusion   • Lung nodule   • HALIMA (obstructive sleep apnea)   • Stage 3 severe COPD by GOLD classification (CMS/[facility])   • Nocturnal hypoxemia   • Personal history of tobacco use, presenting hazards to health         Current Outpatient Medications:   •  albuterol sulfate  (90 Base) MCG/ACT inhaler, Inhale 2 puffs Every 4 (Four) Hours As Needed for Wheezing or Shortness of Air., Disp: 18 g, Rfl: 11  •  amLODIPine (NORVASC) 5 MG tablet, Take 5 mg by mouth Daily., Disp: , Rfl:   •  aspirin 325 MG tablet, Take 325 mg by mouth Daily., Disp: , Rfl:   •  atorvastatin (LIPITOR) 20 MG tablet, Take 1 tablet by mouth Every Night., Disp: 30 tablet, Rfl: 6  •  bumetanide (BUMEX) 2 MG tablet, Take 2 mg by mouth Daily., Disp: , Rfl:   •  Cholecalciferol (VITAMIN D3)  1000 units capsule, Take  by mouth., Disp: , Rfl:   •  cilostazol (PLETAL) 100 MG tablet, TAKE ONE TABLET BY MOUTH TWICE DAILY, Disp: 60 tablet, Rfl: 5  •  clonazePAM (KlonoPIN) 1 MG tablet, 0.5 mg At Night As Needed., Disp: , Rfl:   •  CloNIDine (CATAPRES) 0.1 MG tablet, Take 0.1 mg by mouth every night at bedtime. Take 1 tablet by mouth nightly for bp over 150, Disp: , Rfl:   •  escitalopram (LEXAPRO) 20 MG tablet, Take 10 mg by mouth Daily. 3 tablets by mouth daily, Disp: , Rfl:   •  gabapentin (NEURONTIN) 100 MG capsule, Take 100 mg by mouth. 1 am 2 pm, Disp: , Rfl:   •  hydrALAZINE (APRESOLINE) 25 MG tablet, Take 25 mg by mouth., Disp: , Rfl:   •  lisinopril (PRINIVIL,ZESTRIL) 10 MG tablet, Take 20 mg by mouth Daily., Disp: , Rfl:   •  nitroglycerin (NITROSTAT) 0.4 MG SL tablet, Place 0.4 mg under the tongue Every 5 (Five) Minutes As Needed for Chest Pain. Take no more than 3 doses in 15 minutes., Disp: , Rfl:   •  omega-3 acid ethyl esters (LOVAZA) 1 G capsule, Take 2 g by mouth 2 (Two) Times a Day., Disp: , Rfl:   •  pantoprazole (PROTONIX) 40 MG EC tablet, pantoprazole 40 mg tablet,delayed release  Take 1 tablet every day by oral route., Disp: , Rfl:   •  primidone (MYSOLINE) 250 MG tablet, Take  by mouth 2 (Two) Times a Day., Disp: , Rfl:   •  propranolol LA (INDERAL LA) 160 MG 24 hr capsule, 160 mg Daily., Disp: , Rfl:   •  rOPINIRole (REQUIP) 1 MG tablet, Take 2 mg by mouth Every Night., Disp: , Rfl:   •  umeclidinium-vilanterol (ANORO ELLIPTA) 62.5-25 MCG/INH aerosol powder  inhaler, Inhale 1 puff Daily., Disp: 1 each, Rfl: 11    Allergies   Allergen Reactions   • Dopamine Dizziness     Dropped BP        Past Medical History:   Diagnosis Date   • Dyslipidemia    • Essential (primary) hypertension    • Essential hypertension    • Hyperlipidemia    • Nicotine dependence      other tobacco product, uncomplicated - vapor cig      • Other atherosclerosis of native arteries of extremities, bilateral legs  "(CMS/HCC)     with intermittent claudication   • Tobacco dependence syndrome      Past Surgical History:   Procedure Laterality Date   • CARDIAC CATHETERIZATION     • CORONARY ARTERY BYPASS GRAFT     • GALLBLADDER SURGERY       Social History     Socioeconomic History   • Marital status:      Spouse name: Not on file   • Number of children: Not on file   • Years of education: Not on file   • Highest education level: Not on file   Tobacco Use   • Smoking status: Current Every Day Smoker     Types: Electronic Cigarette   • Smokeless tobacco: Never Used   Substance and Sexual Activity   • Alcohol use: No   • Drug use: No     Family History   Problem Relation Age of Onset   • Heart disease Other           Objective     Blood pressure 170/82, pulse 58, height 180.3 cm (71\"), weight 103 kg (226 lb), SpO2 97 %.  Physical Exam   Constitutional: He is oriented to person, place, and time. Vital signs are normal. He appears well-developed and well-nourished.   Obese   HENT:   Head: Normocephalic and atraumatic.   Nose: Nose normal.   Mouth/Throat: Uvula is midline, oropharynx is clear and moist and mucous membranes are normal.   Mallampati 4   Eyes: Pupils are equal, round, and reactive to light. Conjunctivae, EOM and lids are normal.   Eyeglasses   Neck: Trachea normal and normal range of motion. No tracheal tenderness present. No thyroid mass present.   Cardiovascular: Normal rate, regular rhythm and normal heart sounds. PMI is not displaced. Exam reveals no gallop.   No murmur heard.  Pulmonary/Chest: Effort normal and breath sounds normal. No respiratory distress. He has no decreased breath sounds. He has no wheezes. He has no rhonchi. He exhibits no tenderness.   Abdominal: Soft. Normal appearance and bowel sounds are normal. There is no hepatomegaly. There is no tenderness.   Musculoskeletal:   Normal gait, mild BLE edema     Vascular Status -  His right foot exhibits abnormal foot edema. His left foot exhibits " abnormal foot edema.  Lymphadenopathy:        Head (right side): No submandibular adenopathy present.        Head (left side): No submandibular adenopathy present.     He has no cervical adenopathy.        Right: No supraclavicular adenopathy present.        Left: No supraclavicular adenopathy present.   Neurological: He is alert and oriented to person, place, and time.   Skin: Skin is warm and dry. No rash noted. No cyanosis. Nails show no clubbing.   Psychiatric: He has a normal mood and affect. His speech is normal and behavior is normal. Judgment normal.   Nursing note and vitals reviewed.    PFTs: 11/15/19 (independently reviewed and interpreted by me)  Ratio 61  FVC 2.37/ 50%  FEV1 1.43/ 41%  DLCO 17.62/ 52%  Severe obstruction with no significant bronchodilator response.  Moderately reduced diffusing capacity.  No comparative data available.       Radiology (independently reviewed and interpreted by me): LD CT 8/6/2019 showed 5 x 4 x 7 mm noncalcified nodule lateral right MIDDLE lobe, old granulomatous disease     Assessment/Plan     Conrado was seen today for copd.    Diagnoses and all orders for this visit:    Stage 3 severe COPD by GOLD classification (CMS/[facility])    Nocturnal hypoxemia    HALIMA (obstructive sleep apnea)    Class 1 obesity due to excess calories with serious comorbidity and body mass index (BMI) of 31.0 to 31.9 in adult    Personal history of tobacco use, presenting hazards to health    Electronic cigarette use         Discussion/ Recommendations:   He is actually doing well on the Anoro and has decreased albuterol need so I recommended continuing.  Otherwise, I think he benefits from supplemental oxygen use at night so encouraged him to continue with it.  Unfortunately, he does continue to use the e-cigarette so I have strongly encouraged him to stop due to the risk of worsening his lung disease.    -Continue Anoro daily.  -Continue albuterol as needed for dyspnea or wheeze.  -Repeat CT chest  without contrast on 2/3/20 to follow right middle lobe nodule.  -Continue 2 L nocturnal oxygen via [facility].  -Conrado Cazares is a current electronic cigarettes user.  He currently smokes 1 electronic cigarette per day for a duration of 50 years. I have educated him on the risk of diseases from using tobacco products such as cancer, COPD and heart diease. I advised him to quit and he is not willing to quit. I spent 1 minutes counseling the patient.  -Up-to-date with Pneumovax 23 and flu vaccines.     Patient's Body mass index is 31.52 kg/m². BMI is above normal parameters. Recommendations include: exercise counseling.           Return in about 2 months (around 2/13/2020) for F/u CT chest.      Thank you for allowing me to participate in the care of Conrado Cazares. Please do not hesitate to contact me with any questions.         This document has been electronically signed by Yolie Cazares MD on December 13, 2019 10:17 AM      Dictated using Dragon

## 2019-12-13 ENCOUNTER — OFFICE VISIT (OUTPATIENT)
Dept: PULMONOLOGY | Facility: CLINIC | Age: 69
End: 2019-12-13

## 2019-12-13 VITALS
HEIGHT: 71 IN | BODY MASS INDEX: 31.64 KG/M2 | OXYGEN SATURATION: 97 % | SYSTOLIC BLOOD PRESSURE: 170 MMHG | DIASTOLIC BLOOD PRESSURE: 82 MMHG | WEIGHT: 226 LBS | HEART RATE: 58 BPM

## 2019-12-13 DIAGNOSIS — Z87.891 PERSONAL HISTORY OF TOBACCO USE, PRESENTING HAZARDS TO HEALTH: ICD-10-CM

## 2019-12-13 DIAGNOSIS — G47.33 OSA (OBSTRUCTIVE SLEEP APNEA): ICD-10-CM

## 2019-12-13 DIAGNOSIS — J44.9 STAGE 3 SEVERE COPD BY GOLD CLASSIFICATION (HCC): Primary | ICD-10-CM

## 2019-12-13 DIAGNOSIS — Z78.9 ELECTRONIC CIGARETTE USE: ICD-10-CM

## 2019-12-13 DIAGNOSIS — G47.34 NOCTURNAL HYPOXEMIA: ICD-10-CM

## 2019-12-13 DIAGNOSIS — E66.09 CLASS 1 OBESITY DUE TO EXCESS CALORIES WITH SERIOUS COMORBIDITY AND BODY MASS INDEX (BMI) OF 31.0 TO 31.9 IN ADULT: ICD-10-CM

## 2019-12-13 PROCEDURE — 99214 OFFICE O/P EST MOD 30 MIN: CPT | Performed by: INTERNAL MEDICINE

## 2019-12-17 ENCOUNTER — PROCEDURE VISIT (OUTPATIENT)
Dept: CARDIOLOGY | Facility: CLINIC | Age: 69
End: 2019-12-17

## 2019-12-17 DIAGNOSIS — I34.0 NONRHEUMATIC MITRAL VALVE REGURGITATION: ICD-10-CM

## 2019-12-17 PROCEDURE — 94618 PULMONARY STRESS TESTING: CPT | Performed by: INTERNAL MEDICINE

## 2019-12-17 NOTE — PROGRESS NOTES
Conrado Cazares  Procedure: 6 Minute Walk Test   Indication:pulmonary hypertension    Pretest: BP:150/74               HR:56               Sa02:97               Dyspnea:0               Fatigue:0    Post Test: BP:156/80               HR:62               Sa02:97               Dyspnea:0               Fatigue:0    First 6MWT:yes    Supplemental oxygen during test:no    Stopped before 6 minutes:no    Pauses:none    Results in distance walked:316.46 m    Did individual experience any pain or discomfort:no    Attestation:  I was immediately available for the above test and agree with the data.     NYHA Functional Class II.      Vinay Browning MD PhD  Vinay Browning MD PhD    -----------------------------------------------------------------------------------------------------------------  [facility] performs 6MWT to the ATS guidelines for 6MWT (2002). Predicted distance is from:      -------------------------------------------------------------------------------------------------------------

## 2019-12-23 DIAGNOSIS — J44.9 CHRONIC OBSTRUCTIVE PULMONARY DISEASE, UNSPECIFIED COPD TYPE (HCC): ICD-10-CM

## 2019-12-23 DIAGNOSIS — G47.33 OBSTRUCTIVE SLEEP APNEA: Primary | ICD-10-CM

## 2019-12-24 DIAGNOSIS — J44.9 CHRONIC OBSTRUCTIVE PULMONARY DISEASE, UNSPECIFIED COPD TYPE (HCC): ICD-10-CM

## 2020-01-02 ENCOUNTER — OFFICE VISIT (OUTPATIENT)
Dept: CARDIOLOGY | Facility: CLINIC | Age: 70
End: 2020-01-02

## 2020-01-02 VITALS
WEIGHT: 227.4 LBS | DIASTOLIC BLOOD PRESSURE: 84 MMHG | HEIGHT: 71 IN | SYSTOLIC BLOOD PRESSURE: 160 MMHG | BODY MASS INDEX: 31.84 KG/M2 | OXYGEN SATURATION: 98 % | HEART RATE: 53 BPM

## 2020-01-02 DIAGNOSIS — I36.1 NONRHEUMATIC TRICUSPID VALVE REGURGITATION: ICD-10-CM

## 2020-01-02 DIAGNOSIS — I34.0 NONRHEUMATIC MITRAL VALVE REGURGITATION: ICD-10-CM

## 2020-01-02 DIAGNOSIS — I31.39 PERICARDIAL EFFUSION: ICD-10-CM

## 2020-01-02 DIAGNOSIS — E66.09 CLASS 1 OBESITY DUE TO EXCESS CALORIES WITH SERIOUS COMORBIDITY AND BODY MASS INDEX (BMI) OF 31.0 TO 31.9 IN ADULT: ICD-10-CM

## 2020-01-02 DIAGNOSIS — R06.09 DYSPNEA ON EXERTION: ICD-10-CM

## 2020-01-02 DIAGNOSIS — I37.1 NONRHEUMATIC PULMONARY VALVE INSUFFICIENCY: ICD-10-CM

## 2020-01-02 DIAGNOSIS — I27.20 PULMONARY HYPERTENSION (HCC): Primary | ICD-10-CM

## 2020-01-02 DIAGNOSIS — I25.10 CORONARY ARTERY DISEASE INVOLVING NATIVE CORONARY ARTERY OF NATIVE HEART WITHOUT ANGINA PECTORIS: ICD-10-CM

## 2020-01-02 PROCEDURE — 99214 OFFICE O/P EST MOD 30 MIN: CPT | Performed by: INTERNAL MEDICINE

## 2020-01-02 NOTE — PROGRESS NOTES
Cardiovascular Medicine      Vinay Browning M.D., Ph.D., [facility]             History of Present Illness  This is a 69 y.o. male with:    1. PAH  2. MR  3. TR  4. PI  5. Pericardial effusion  6. ASCAD with remote CABG, 2007  7. Risks: Obese, former smoker, PAD, HLD, HTN, CKD    Conrado Cazares is a 69 y.o. male who presents in follow-up.  I saw him to establish care for coronary artery disease.  Patient had a abnormal ischemia evaluation in the past after he complained of left arm pain and left shoulder pain.  Invasive coronary angiography in 2007 showed multivessel coronary artery disease.  He believes that he has a LIMA and possibly a saphenous vein graft to the circumflex.  When I last saw him he was prescribed aspirin, atorvastatin and propranolol.  He is had no resting, exertional or nocturnal angina.  He was complaining of significant exertional dyspnea and lower extremity edema.  He was sent for a 2D echocardiogram which showed pulmonary hypertension.  PA pressures were elevated.  The patient's right ventricle was dilated, but had normal RVEF.  He was found to have significant pulmonary insufficiency and tricuspid regurgitation.  He was also found to have mitral regurgitation.  He was also found to have trace pericardial effusion.  He is a heavy former smoker.  Unfortunately, he continues to vape a product with nicotine.  He does have CKD with proteinuria and this has been attributed as a cause of his lower extremity edema.  He remains on Bumex with nephrology. He did see Dr. Cazares and was diagnosed with COPD.   He started on inhalers for COPD.     The following portions of the patient's history were reviewed and updated as appropriate: allergies, current medications, past family history, past medical history, past social history, past surgical history and problem list.      Review of Systems - Review of Systems   Cardiovascular: Positive for dyspnea on exertion and leg swelling. Negative for chest  pain, claudication, cyanosis, irregular heartbeat, near-syncope, orthopnea, palpitations, paroxysmal nocturnal dyspnea and syncope.   Respiratory: Positive for shortness of breath. Negative for cough, hemoptysis and sleep disturbances due to breathing.         All other systems were reviewed and were negative.    family history includes Heart disease in an other family member.     reports that he has been smoking electronic cigarette. He has never used smokeless tobacco. He reports that he does not drink alcohol or use drugs.    Allergies   Allergen Reactions   • Dopamine Dizziness     Dropped BP          Current Outpatient Medications:   •  albuterol sulfate  (90 Base) MCG/ACT inhaler, Inhale 2 puffs Every 4 (Four) Hours As Needed for Wheezing or Shortness of Air., Disp: 18 g, Rfl: 11  •  amLODIPine (NORVASC) 5 MG tablet, Take 5 mg by mouth Daily., Disp: , Rfl:   •  aspirin 325 MG tablet, Take 325 mg by mouth Daily., Disp: , Rfl:   •  atorvastatin (LIPITOR) 20 MG tablet, Take 1 tablet by mouth Every Night., Disp: 30 tablet, Rfl: 6  •  bumetanide (BUMEX) 2 MG tablet, Take 2 mg by mouth Daily., Disp: , Rfl:   •  Cholecalciferol (VITAMIN D3) 1000 units capsule, Take  by mouth., Disp: , Rfl:   •  cilostazol (PLETAL) 100 MG tablet, TAKE ONE TABLET BY MOUTH TWICE DAILY, Disp: 60 tablet, Rfl: 5  •  clonazePAM (KlonoPIN) 1 MG tablet, 0.5 mg At Night As Needed., Disp: , Rfl:   •  CloNIDine (CATAPRES) 0.1 MG tablet, Take 0.1 mg by mouth every night at bedtime. Take 1 tablet by mouth nightly for bp over 150, Disp: , Rfl:   •  escitalopram (LEXAPRO) 20 MG tablet, Take 10 mg by mouth Daily. 3 tablets by mouth daily, Disp: , Rfl:   •  gabapentin (NEURONTIN) 100 MG capsule, Take 100 mg by mouth. 1 am 2 pm, Disp: , Rfl:   •  hydrALAZINE (APRESOLINE) 25 MG tablet, Take 25 mg by mouth. prn, Disp: , Rfl:   •  lisinopril (PRINIVIL,ZESTRIL) 10 MG tablet, Take 20 mg by mouth Daily., Disp: , Rfl:   •  nitroglycerin (NITROSTAT) 0.4  "MG SL tablet, Place 0.4 mg under the tongue Every 5 (Five) Minutes As Needed for Chest Pain. Take no more than 3 doses in 15 minutes., Disp: , Rfl:   •  omega-3 acid ethyl esters (LOVAZA) 1 G capsule, Take 2 g by mouth 2 (Two) Times a Day., Disp: , Rfl:   •  pantoprazole (PROTONIX) 40 MG EC tablet, pantoprazole 40 mg tablet,delayed release  Take 1 tablet every day by oral route., Disp: , Rfl:   •  primidone (MYSOLINE) 250 MG tablet, Take  by mouth 2 (Two) Times a Day., Disp: , Rfl:   •  propranolol LA (INDERAL LA) 160 MG 24 hr capsule, 160 mg Daily., Disp: , Rfl:   •  rOPINIRole (REQUIP) 1 MG tablet, Take 2 mg by mouth Every Night., Disp: , Rfl:   •  umeclidinium-vilanterol (ANORO ELLIPTA) 62.5-25 MCG/INH aerosol powder  inhaler, Inhale 1 puff Daily., Disp: 1 each, Rfl: 11      Physical Exam:  Vitals:    01/02/20 0956   BP: 160/84   BP Location: Left arm   Patient Position: Sitting   Cuff Size: Adult   Pulse: 53   SpO2: 98%   Weight: 103 kg (227 lb 6.4 oz)   Height: 180.3 cm (71\")   PainSc: 0-No pain     Pulse Ox: Normal  on room air  General: alert, appears stated age and cooperative     Body Habitus: obese    HEENT: Head: Normocephalic, no lesions, without obvious abnormality. No arcus senilis, xanthelasma or xanthomas.    JVP: Volume/Pulsation: Normal.  Normal waveforms.   Appropriate inspiratory decrease.  No Kussmaul's. No Janis's.     Precordium: Normal impulses. P2 is not palpable.  RV Heave: absent  LV Heave: absent  [place]:  normal size and placement  Palpable S4: absent.  Heart rate: bradycardic    Heart Rhythm: regular     Heart Sounds: S1: normal intensity  S2: normal intensity  S3: absent   S4: absent  Opening Snap: absent    A2-OS:  no  Pericardial Rub:  Absent   Ejection click: None      Murmurs:  absent   Extremity: moves all extremities equally.       DATA REVIEWED:     Results for orders placed in visit on 09/06/19   Adult Transthoracic Echo Complete W/ Cont if Necessary Per Protocol    Narrative " · The left ventricle systolic function is mildly decreased. Estimated to   be in the range of 41-45%. Mild global hypokinesis. Pseudo-normal   diastolic dysfunction.  · Right ventricle systolic function is normal. Moderate right ventricular   hypertrophy.  · Mild mitral regurgitation.  · Mild tricuspid regurgitation. PA systolic pressure is at least 45 mmHg.   Mild pulmonary hypertension.  · Dilated pulmonary annulus with mild to moderate pulmonary regurgitation.  · Trace pericardial effusion adjacent to the right ventricle.                   Lab Results   Component Value Date    HGBA1C 6.7 (H) 07/15/2019     No results found for: DDIMER  No results found for: ALT  Lab Results   Component Value Date    HGBA1C 6.7 (H) 07/15/2019    HGBA1C 7.1 (H) 04/17/2019       Assessment/Plan      1. Pulmonary hypertension (CMS/HCC). PAH/PVH. WHO Group 3; FC: III.  RV status: Abnormal:.   Patient appears euvolemic. Perfusion status: good.  Last 6MWT: Pending.  I discussed with him today that he likely has a pulmonary etiology for his PAH, likely COPD.  I recommended a thorough pulmonary work-up.  Also possible that he has an element of HFbEF: His LVEF is 41-45%.  He likely is going to require an ischemia evaluation, as well as a possible RHC.  -Discussed evaluation, treatment and usual course.  -No current indication for PAH-specific medications  -No compelling indication at this time for RHC  -Will continue active clinical surveillance.  Signs and symptoms of worsening PAH and RV failure were discussed.  -I have asked the patient that if they were to move to be certain they see someone with expertise in PAH. These providers can be located at [email or web address].  -DSE since he has severe COPD    2. Nonrheumatic mitral valve regurgitation/Nonrheumatic tricuspid valve regurgitation/Nonrheumatic pulmonary valve insufficiency. ACC stage B.  There are no surgical indications at this time.  · The patient has been advised to remain  in clinical surveillance every 6 months.  · Signs and symptoms of worsening valve disease discussed.  I've asked the patient contact me for an earlier appointment if these develop.  · I've recommended a repeat 2D TTE every 1 year.  · TTE due: 2020.  No indication based on 2017 ACC/AHA guidelines for IE prophylaxis for dental procedures: Optimal oral health is recommended through regular professional dental care and the use of appropriate dental products, such as manual, powered, and ultrasonic toothbrushes; dental floss; and other plaque-removal devices    3. Coronary artery disease involving native coronary artery of native heart without angina pectoris. No anginal symptoms. The patient has been revascularized.  Revascularization:  CABG.   -Propranolol  -ASA--DOSE reduce 81mg  -Lipitor (atorvastatin)  -SL nitro prn  -Call 911 immediately for concerning symptoms.    4.  Pericardial effusion.The prevalence of pericardial effusion is about 3%.  The patient is currently Asymptomatic.  The size of the effusion is small.  I briefly discussed the etiology of pericardial effusions.  A handout was also offered to the patient which explained the disease process, including signs of worsening pericardial effusion.  At this point I have counseled conservative management.   -Will repeat a 2D TTE in 6 months  -Please call for worsening signs or symptoms.    5. Cardiac Risk Assessments based on 2019 ACCF guidelines:  · A team-based care approach is recommended for the control of risk factors associated with ASCAD.  As such, Conrado Cazares was requested to have ongoing follow-up with their PCP.  · Continue follow-up visits with PCP for monitoring of labs; diet emphasizing intake of vegetables, fruits, nuts, whole grains and fish is recommended.  · Physical activity recommendations were provided.  · Essential HTN is a significant risk factor for stroke, heart disease and vascular disease. I've recommended the patient continue  current medications, if any, as prescribed by the primary care provider. I recommended they have close follow-up for ongoing mgmt of this and the medical comorbidities associated with HTN with their PCP.  They were also provided with information regarding maintaining a healthy weight, heart-healthy dietary pattern DASH information.  Goal blood pressure less than 130/80.   · The patient's BMI is recommended to be calculated at least annually.  The patient's BMI is There is no height or weight on file to calculate BMI..  This places the patient in weight class:  Obese Class I: 30-34.9kg/m2.   Hand out, increase aerobic activity  cut out extra servings, decrease soda or juice intake, eat breakfast, eat more fruits and vegetables, family to eat at dinner table more often, have 3 meals a day, increase physical activity, increase water intake, keep TV off during meals, plan meals, reduce fast food intake, reduce portion size and reduce screen time; close PCP follow-up.  Conrado LOY Cazares is a current electronic cigarettes user.   I have educated him on the risk of diseases from using tobacco products such as cancer, COPD and heart diease.  He is pre-contemplative.  Time: 3 minutes.      Return in about 3 months (around 4/2/2020).

## 2020-01-02 NOTE — PATIENT INSTRUCTIONS
Dr. Browning has recommended a pharmacologic (dobutamine) stress test with echocardiography.    What is a Dobutamine Stress Echo Test?     Why do I need a stress test?     This test helps your physician determine how your heart functions when it is made to work harder by injecting dobutamine. Dobutamine is a drug that has an effect on the heart similar to exercise. This test is done on patients that are unable to exercise adequately or have severe lung disease. An echocardiogram, the ultrasound study of the heart, evaluates the heart’s size, how strongly it pumps blood, and how well the valves are working.     The dobutamine stress echo test is useful to determine:   • If there is a decreased supply of blood and oxygen to the heart at rest as well as with exercise   • If there is reduced movement of the heart muscle.   • Overall level of cardiovascular conditioning   • How quickly the heart recovers after exercise   • How hard the heart can work before symptoms develop   • Estimate the risk of surgery that can cause cardiac complications     What happens during the test?   • An echo technician will do a resting scan of your heart while you are lying down on an exam table. You will lie on your back and on your left side  . • A stress  will prep ten small areas on your chest and place electrodes (small, flat, sticky patches) on these areas. The electrodes are attached to an EKG monitor that charts your heart’s electrical activity during the test.   • You will be lying down on an exam table while the technician performs a resting EKG and blood pressure.   • A nurse will place an IV into a vein in your arm or hand. Next, your heart will be “stressed” by injecting a medication called dobutamine into the IV. This medication will increase your heart rate.   • Please tell the technician immediately if you have chest pain, shortness of breath, or any other unusual symptoms at any time.   • The stress lab staff  will watch for any changes on the EKG monitor that suggest the test should be stopped. The testing area is supervised by a physician.   • The echo technician will take images of your heart every three minutes during the test. • At the peak effects of dobutamine, a second echocardiogram will be taken to visualize the heart’s motion with exercise.   • Your heart rate, blood pressure, and EKG will continue to be monitored until the levels are returning to normal. One more echo scan will be done as your heart rate returns to normal    The risks     This test is very safe and there are usually no problems. There is a small risk of an abnormal heartbeat, chest pain or nausea and, if this happens, the appropriate action will be taken. On very rare occasions (approximately 1 in 2000) the test is associated with life-threatening events. If you have any questions about the risks associated with the procedure, your medical team will be able to discuss them with you at the appointment.      Instruction checklist for the dobutamine stress echo test:     . • Bring a list of medications you take with you to the test. Include the name and dosage amounts of each medicine. This information can be found on the prescription or bottle label.     You may take any of your regular morning medications unless otherwise instructed.       § If you have asthma, please bring your inhaler medication with you.     § If you have diabetes, ask your physician how to adjust your medications the day of your test.     • Please refrain from any strenuous exercise or activities the day before your test, or the day of the test     • Do Not eat or drink anything except for small amounts of water for 4 hours prior to your testing time.      • Do Not use lotion or powders on your chest the day of the test.     • Wear loose fitting, comfortable clothing. Pants or shorts and short sleeve shirts are preferred. (No long sleeves.) Do not wear one-piece  undergarments or body suits.      • The Dobutamine Stress Echo takes about one hour to complete including check-in time and actual test time.      • If you need to CANCEL OR CHANGE your appointment, please call [phone].     • If you have any QUESTIONS about the test, Please call [phone] and ask to speak to Dr. Nallely Alves's Medical Assistant. Please leave a message if prompted to do so. We will return your call as soon as possible.     • We request a 24 hour notice for changes or cancellations.    You MUST complete the DSE within 30 calendar days of being ordered by Dr. Browning.     You MUST arrive for your DSE appointment 10 minutes BEFORE the scheduled time, or your test will be rescheduled.    Results:    Dr. Browning will be physically present during your dobutamine stress echo.  You will be given the results of your test in real time.      Pulmonary Hypertension  Pulmonary hypertension is a long-term (chronic) condition in which there is high blood pressure in the arteries in the lungs (pulmonary arteries). This condition occurs when pulmonary arteries become narrow and tight, making it harder for blood to flow through the lungs. This in turn makes the heart work harder to pump blood through the lungs, making it harder for you to breathe.  Over time, pulmonary hypertension can weaken and damage the heart muscle, specifically the right side of the heart. Pulmonary hypertension is a serious condition that can be life-threatening.  What are the causes?  This condition may be caused by different medical conditions. It can be categorized by cause into five groups:  · Group 1: Pulmonary hypertension that is caused by abnormal growth of small blood vessels in the lungs (pulmonary arterial hypertension). The abnormal blood vessel growth may have no known cause, or it may be:  ? Passed from parent to child (hereditary).  ? Caused by another disease, such as a connective tissue disease (including  lupus or scleroderma), congenital heart disease, liver disease, or HIV.  ? Caused by certain medicines or poisons (toxins).  · Group 2: Pulmonary hypertension that is caused by weakness of the left chamber of the heart (left ventricle) or heart valve disease.  · Group 3: Pulmonary hypertension that is caused by lung disease or low oxygen levels. Causes in this group include:  ? Emphysema or chronic obstructive pulmonary disease (COPD).  ? Untreated sleep apnea.  ? Pulmonary fibrosis.  ? Long-term exposure to high altitudes in certain people who may already be at higher risk for pulmonary hypertension.  · Group 4: Pulmonary hypertension that is caused by blood clots in the lungs (pulmonary emboli).  · Group 5: Other causes of pulmonary hypertension, such as sickle cell anemia, sarcoidosis, tumors pressing on the pulmonary arteries, and various other diseases.  What are the signs or symptoms?  Symptoms of this condition include:  · Shortness of breath. You may notice shortness of breath with:  ? Activity, such as walking.  ? Minimal activity, such as getting dressed.  ? No activity, like when you are sitting still.  · A cough. Sometimes, bloody mucus from the lungs may be coughed up (hemoptysis).  · Tiredness and fatigue.  · Dizziness, lightheadedness, or fainting, especially with physical activity.  · Rapid heartbeat, or feeling your heart flutter or skip a beat (palpitations).  · Veins in the neck getting larger.  · Swelling of the lower legs, abdomen, or both.  · Bluish color of the lips and fingertips.  · Chest pain or tightness in the chest.  · Abdominal pain, especially in the upper abdomen.  How is this diagnosed?  This condition may be diagnosed based on one or more of the following tests:  · Chest X-ray.  · Blood tests.  · CT scan.  · Pulmonary function test. This test measures how much air your lungs can hold. It also tests how well air moves in and out of your lungs.  · 6-minute walk test. This tests how  severe your condition is in relation to your activity levels.  · Electrocardiogram (ECG). This test records the electrical impulses of the heart.  · Echocardiogram. This test uses sound waves (ultrasound) to produce an image of the heart.  · Cardiac catheterization. This is a procedure in which a thin tube (catheter) is passed into the pulmonary artery and used to test the pressure in your pulmonary artery and the right side of your heart.  · Lung biopsy. This involves having a procedure to remove a small sample of lung tissue for testing. This may help determine an underlying cause of your pulmonary hypertension.  How is this treated?  There is no cure for this condition, but treatment can help to relieve symptoms and slow the progress of the condition. Treatment may include:  · Cardiac rehabilitation. This is a treatment program that includes exercise training, education, and counseling to help you get stronger and return to an active lifestyle.  · Oxygen therapy.  · Medicines that:  ? Lower blood pressure.  ? Relax (dilate) the pulmonary blood vessels.  ? Help the heart beat more efficiently and pump more blood.  ? Help the body get rid of extra fluid (diuretics).  ? Thin the blood in order to prevent blood clots in the lungs.  · Lung surgery to relieve pressure on the heart, for severe cases that do not respond to medical treatment.  · Heart-lung transplant, or lung transplant. This may be done in very severe cases.  Follow these instructions at home:  Eating and drinking    · Eat a healthy diet that includes plenty of fresh fruits and vegetables, whole grains, and beans.  · Limit your salt (sodium) intake to less than 2,300 mg a day.  Lifestyle  · Do not use any products that contain nicotine or tobacco, such as cigarettes and e-cigarettes. If you need help quitting, ask your health care provider.  · Avoid secondhand smoke.  Activity  · Get plenty of rest.  · Exercise as directed. Talk with your health care  provider about what type of exercise is safe for you.  · Avoid hot tubs and saunas.  · Avoid high altitudes.  General instructions  · Take over-the-counter and prescription medicines only as told by your health care provider. Do not change or stop medicines without checking with your health care provider.  · Stay up to date on your vaccines, especially yearly flu (influenza) and pneumonia vaccines.  · If you are a woman of child-bearing age, avoid becoming pregnant. Talk with your health care provider about birth control.  · Consider ways to get support for anxiety and stress of living with pulmonary hypertension. Talk with your health care provider about support groups and online resources.  · Use oxygen therapy at home as directed.  · Keep track of your weight. Weight gain could be a sign that your condition is getting worse.  · Keep all follow-up visits as told by your health care provider. This is important.  Contact a health care provider if:  · Your cough gets worse.  · You have more shortness of breath than usual, or you start to have trouble doing activities that you could do before.  · You need to use medicines or oxygen more frequently or in higher dosages than usual.  Get help right away if:  · You have severe shortness of breath.  · You have chest pain or pressure.  · You cough up blood.  · You have swelling of your feet or legs that gets worse.  · You have rapid weight gain over a period of 1-2 days.  · Your medicines or oxygen do not provide relief.  Summary  · Pulmonary hypertension is a chronic condition in which there is high blood pressure in the arteries in the lungs (pulmonary arteries).  · Pulmonary hypertension is a serious condition that can be life-threatening. It can be caused by a variety of illnesses.  · Treatment may involve taking medicines and using oxygen therapy. Severe cases may require surgery or a transplant.  This information is not intended to replace advice given to you by your  health care provider. Make sure you discuss any questions you have with your health care provider.  Document Released: 10/14/2008 Document Revised: 03/13/2018 Document Reviewed: 03/13/2018  [employer] Interactive Patient Education © 2019 [employer]      Steps to Quit Smoking    Smoking tobacco can be harmful to your health and can affect almost every organ in your body. Smoking puts you, and those around you, at risk for developing many serious chronic diseases. Quitting smoking is difficult, but it is one of the best things that you can do for your health. It is never too late to quit.  What are the benefits of quitting smoking?  When you quit smoking, you lower your risk of developing serious diseases and conditions, such as:  · Lung cancer or lung disease, such as COPD.  · Heart disease.  · Stroke.  · Heart attack.  · Infertility.  · Osteoporosis and bone fractures.  Additionally, symptoms such as coughing, wheezing, and shortness of breath may get better when you quit. You may also find that you get sick less often because your body is stronger at fighting off colds and infections. If you are pregnant, quitting smoking can help to reduce your chances of having a baby of low birth weight.  How do I get ready to quit?  When you decide to quit smoking, create a plan to make sure that you are successful. Before you quit:  · Pick a date to quit. Set a date within the next two weeks to give you time to prepare.  · Write down the reasons why you are quitting. Keep this list in places where you will see it often, such as on your bathroom mirror or in your car or wallet.  · Identify the people, places, things, and activities that make you want to smoke (triggers) and avoid them. Make sure to take these actions:  ? Throw away all cigarettes at home, at work, and in your car.  ? Throw away smoking accessories, such as ashtrays and lighters.  ? Clean your car and make sure to empty the ashtray.  ? Clean your home,  including curtains and carpets.  · Tell your family, friends, and coworkers that you are quitting. Support from your loved ones can make quitting easier.  · Talk with your health care provider about your options for quitting smoking.  · Find out what treatment options are covered by your health insurance.  What strategies can I use to quit smoking?  Talk with your healthcare provider about different strategies to quit smoking. Some strategies include:  · Quitting smoking altogether instead of gradually lessening how much you smoke over a period of time. Research shows that quitting “cold turkey” is more successful than gradually quitting.  · Attending in-person counseling to help you build problem-solving skills. You are more likely to have success in quitting if you attend several counseling sessions. Even short sessions of 10 minutes can be effective.  · Finding resources and support systems that can help you to quit smoking and remain smoke-free after you quit. These resources are most helpful when you use them often. They can include:  ? Online chats with a counselor.  ? Telephone quitlines.  ? Printed self-help materials.  ? Support groups or group counseling.  ? Text messaging programs.  ? Mobile phone applications.  · Taking medicines to help you quit smoking. (If you are pregnant or breastfeeding, talk with your health care provider first.) Some medicines contain nicotine and some do not. Both types of medicines help with cravings, but the medicines that include nicotine help to relieve withdrawal symptoms. Your health care provider may recommend:  ? Nicotine patches, gum, or lozenges.  ? Nicotine inhalers or sprays.  ? Non-nicotine medicine that is taken by mouth.  Talk with your health care provider about combining strategies, such as taking medicines while you are also receiving in-person counseling. Using these two strategies together makes you more likely to succeed in quitting than if you used either  strategy on its own.  If you are pregnant or breastfeeding, talk with your health care provider about finding counseling or other support strategies to quit smoking. Do not take medicine to help you quit smoking unless told to do so by your health care provider.  What things can I do to make it easier to quit?  Quitting smoking might feel overwhelming at first, but there is a lot that you can do to make it easier. Take these important actions:  · Reach out to your family and friends and ask that they support and encourage you during this time. Call telephone quitlines, reach out to support groups, or work with a counselor for support.  · Ask people who smoke to avoid smoking around you.  · Avoid places that trigger you to smoke, such as bars, parties, or smoke-break areas at work.  · Spend time around people who do not smoke.  · Lessen stress in your life, because stress can be a smoking trigger for some people. To lessen stress, try:  ? Exercising regularly.  ? Deep-breathing exercises.  ? Yoga.  ? Meditating.  ? Performing a body scan. This involves closing your eyes, scanning your body from head to toe, and noticing which parts of your body are particularly tense. Purposefully relax the muscles in those areas.  · Download or purchase mobile phone or tablet apps (applications) that can help you stick to your quit plan by providing reminders, tips, and encouragement. There are many free apps, such as QuitGuide from the CDC (Centers for Disease Control and Prevention). You can find other support for quitting smoking (smoking cessation) through smokefree.gov and other websites.  How will I feel when I quit smoking?  Within the first 24 hours of quitting smoking, you may start to feel some withdrawal symptoms. These symptoms are usually most noticeable 2-3 days after quitting, but they usually do not last beyond 2-3 weeks. Changes or symptoms that you might experience include:  · Mood swings.  · Restlessness, anxiety,  or irritation.  · Difficulty concentrating.  · Dizziness.  · Strong cravings for sugary foods in addition to nicotine.  · Mild weight gain.  · Constipation.  · Nausea.  · Coughing or a sore throat.  · Changes in how your medicines work in your body.  · A depressed mood.  · Difficulty sleeping (insomnia).  After the first 2-3 weeks of quitting, you may start to notice more positive results, such as:  · Improved sense of smell and taste.  · Decreased coughing and sore throat.  · Slower heart rate.  · Lower blood pressure.  · Clearer skin.  · The ability to breathe more easily.  · Fewer sick days.  Quitting smoking is very challenging for most people. Do not get discouraged if you are not successful the first time. Some people need to make many attempts to quit before they achieve long-term success. Do your best to stick to your quit plan, and talk with your health care provider if you have any questions or concerns.  This information is not intended to replace advice given to you by your health care provider. Make sure you discuss any questions you have with your health care provider.  Document Released: 12/12/2002 Document Revised: 07/24/2018 Document Reviewed: 05/03/2016  [employer] Interactive Patient Education © 2019 [employer]      Preventing Unhealthy Weight Gain, Adult  Staying at a healthy weight is important to your overall health. When fat builds up in your body, you may become overweight or obese. Being overweight or obese increases your risk of developing certain health problems, such as heart disease, diabetes, sleeping problems, joint problems, and some types of cancer.  Unhealthy weight gain is often the result of making unhealthy food choices or not getting enough exercise. You can make changes to your lifestyle to prevent obesity and stay as healthy as possible.  What nutrition changes can be made?    · Eat only as much as your body needs. To do this:  ? Pay attention to signs that you are hungry or  full. Stop eating as soon as you feel full.  ? If you feel hungry, try drinking water first before eating. Drink enough water so your urine is clear or pale yellow.  ? Eat smaller portions. Pay attention to portion sizes when eating out.  ? Look at serving sizes on food labels. Most foods contain more than one serving per container.  ? Eat the recommended number of calories for your gender and activity level. For most active people, a daily total of 2,000 calories is appropriate. If you are trying to lose weight or are not very active, you may need to eat fewer calories. Talk with your health care provider or a diet and nutrition specialist (dietitian) about how many calories you need each day.  · Choose healthy foods, such as:  ? Fruits and vegetables. At each meal, try to fill at least half of your plate with fruits and vegetables.  ? Whole grains, such as whole-wheat bread, brown rice, and quinoa.  ? Lean meats, such as chicken or fish.  ? Other healthy proteins, such as beans, eggs, or tofu.  ? Healthy fats, such as nuts, seeds, fatty fish, and olive oil.  ? Low-fat or fat-free dairy products.  · Check food labels, and avoid food and drinks that:  ? Are high in calories.  ? Have added sugar.  ? Are high in sodium.  ? Have saturated fats or trans fats.  · Cook foods in healthier ways, such as by baking, broiling, or grilling.  · Make a meal plan for the week, and shop with a grocery list to help you stay on track with your purchases. Try to avoid going to the grocery store when you are hungry.  · When grocery shopping, try to shop around the outside of the store first, where the fresh foods are. Doing this helps you to avoid prepackaged foods, which can be high in sugar, salt (sodium), and fat.  What lifestyle changes can be made?    · Exercise for 30 or more minutes on 5 or more days each week. Exercising may include brisk walking, yard work, biking, running, swimming, and team sports like basketball and soccer.  Ask your health care provider which exercises are safe for you.  · Do muscle-strengthening activities, such as lifting weights or using resistance bands, on 2 or more days a week.  · Do not use any products that contain nicotine or tobacco, such as cigarettes and e-cigarettes. If you need help quitting, ask your health care provider.  · Limit alcohol intake to no more than 1 drink a day for nonpregnant women and 2 drinks a day for men. One drink equals 12 oz of beer, 5 oz of wine, or 1½ oz of hard liquor.  · Try to get 7-9 hours of sleep each night.  What other changes can be made?  · Keep a food and activity journal to keep track of:  ? What you ate and how many calories you had. Remember to count the calories in sauces, dressings, and side dishes.  ? Whether you were active, and what exercises you did.  ? Your calorie, weight, and activity goals.  · Check your weight regularly. Track any changes. If you notice you have gained weight, make changes to your diet or activity routine.  · Avoid taking weight-loss medicines or supplements. Talk to your health care provider before starting any new medicine or supplement.  · Talk to your health care provider before trying any new diet or exercise plan.  Why are these changes important?  Eating healthy, staying active, and having healthy habits can help you to prevent obesity. Those changes also:  · Help you manage stress and emotions.  · Help you connect with friends and family.  · Improve your self-esteem.  · Improve your sleep.  · Prevent long-term health problems.  What can happen if changes are not made?  Being obese or overweight can cause you to develop joint or bone problems, which can make it hard for you to stay active or do activities you enjoy. Being obese or overweight also puts stress on your heart and lungs and can lead to health problems like diabetes, heart disease, and some cancers.  Where to find more information  Talk with your health care provider or a  dietitian about healthy eating and healthy lifestyle choices. You may also find information from:  · U[place]. Department of Agriculture, MyPlate: www.choosemyplate.gov  · American Heart Association: www.heart.org  · Centers for Disease Control and Prevention: www.cdc.gov  Summary  · Staying at a healthy weight is important to your overall health. It helps you to prevent certain diseases and health problems, such as heart disease, diabetes, joint problems, sleep disorders, and some types of cancer.  · Being obese or overweight can cause you to develop joint or bone problems, which can make it hard for you to stay active or do activities you enjoy.  · You can prevent unhealthy weight gain by eating a healthy diet, exercising regularly, not smoking, limiting alcohol, and getting enough sleep.  · Talk with your health care provider or a dietitian for guidance about healthy eating and healthy lifestyle choices.  This information is not intended to replace advice given to you by your health care provider. Make sure you discuss any questions you have with your health care provider.  Document Released: 12/19/2017 Document Revised: 09/28/2018 Document Reviewed: 01/24/2018  [employer] Interactive Patient Education © 2019 [employer]

## 2020-01-14 ENCOUNTER — HOSPITAL ENCOUNTER (OUTPATIENT)
Dept: CARDIOLOGY | Facility: HOSPITAL | Age: 70
Discharge: HOME | End: 2020-01-14
Admitting: INTERNAL MEDICINE

## 2020-01-14 VITALS — WEIGHT: 227 LBS | BODY MASS INDEX: 31.66 KG/M2

## 2020-01-14 LAB
BH CV ECHO MEAS - BSA(HAYCOCK): 2.3 M^2
BH CV ECHO MEAS - BSA: 2.2 M^2
BH CV ECHO MEAS - BZI_BMI: 31.7 KILOGRAMS/M^2
BH CV ECHO MEAS - BZI_METRIC_HEIGHT: 180.3 CM
BH CV ECHO MEAS - BZI_METRIC_WEIGHT: 103 KG
BH CV STRESS BP STAGE 1: NORMAL
BH CV STRESS BP STAGE 2: NORMAL
BH CV STRESS BP STAGE 3: NORMAL
BH CV STRESS BP STAGE 4: NORMAL
BH CV STRESS DOSE DOBUTAMINE STAGE 1: 10
BH CV STRESS DOSE DOBUTAMINE STAGE 2: 20
BH CV STRESS DOSE DOBUTAMINE STAGE 3: 30
BH CV STRESS DOSE DOBUTAMINE STAGE 4: 40
BH CV STRESS DURATION MIN STAGE 1: 3
BH CV STRESS DURATION MIN STAGE 2: 3
BH CV STRESS DURATION MIN STAGE 3: 3
BH CV STRESS DURATION MIN STAGE 4: 2
BH CV STRESS DURATION SEC STAGE 1: 0
BH CV STRESS DURATION SEC STAGE 2: 0
BH CV STRESS DURATION SEC STAGE 3: 0
BH CV STRESS DURATION SEC STAGE 4: 35
BH CV STRESS ECHO POST STRESS EJECTION FRACTION EF: 60 %
BH CV STRESS HR STAGE 1: 45
BH CV STRESS HR STAGE 2: 51
BH CV STRESS HR STAGE 3: 52
BH CV STRESS HR STAGE 4: 134
BH CV STRESS PROTOCOL 1: NORMAL
BH CV STRESS RECOVERY BP: NORMAL MMHG
BH CV STRESS RECOVERY HR: 92 BPM
BH CV STRESS STAGE 1: 1
BH CV STRESS STAGE 2: 2
BH CV STRESS STAGE 3: 3
BH CV STRESS STAGE 4: 4
MAXIMAL PREDICTED HEART RATE: 153 BPM
PERCENT MAX PREDICTED HR: 90.85 %
STRESS BASELINE BP: NORMAL MMHG
STRESS BASELINE HR: 49 BPM
STRESS PERCENT HR: 107 %
STRESS POST ESTIMATED WORKLOAD: 1 METS
STRESS POST EXERCISE DUR MIN: 11 MIN
STRESS POST EXERCISE DUR SEC: 35 SEC
STRESS POST PEAK BP: NORMAL MMHG
STRESS POST PEAK HR: 139 BPM
STRESS TARGET HR: 130 BPM

## 2020-01-14 PROCEDURE — 25010000002 DOBUTAMINE: Performed by: INTERNAL MEDICINE

## 2020-01-14 PROCEDURE — 93325 DOPPLER ECHO COLOR FLOW MAPG: CPT

## 2020-01-14 PROCEDURE — 93351 STRESS TTE COMPLETE: CPT | Performed by: INTERNAL MEDICINE

## 2020-01-14 PROCEDURE — 25010000002 ATROPINE PER 0.01 MG: Performed by: INTERNAL MEDICINE

## 2020-01-14 PROCEDURE — 93017 CV STRESS TEST TRACING ONLY: CPT

## 2020-01-14 PROCEDURE — 93350 STRESS TTE ONLY: CPT

## 2020-01-14 PROCEDURE — 93320 DOPPLER ECHO COMPLETE: CPT

## 2020-01-14 RX ORDER — ATROPINE SULFATE 1 MG/ML
1 INJECTION, SOLUTION INTRAMUSCULAR; INTRAVENOUS; SUBCUTANEOUS ONCE
Status: COMPLETED | OUTPATIENT
Start: 2020-01-14 | End: 2020-01-14

## 2020-01-14 RX ADMIN — ATROPINE SULFATE 1 MG: 1 INJECTION, SOLUTION INTRAMUSCULAR; INTRAVENOUS; SUBCUTANEOUS at 13:08

## 2020-01-14 RX ADMIN — METOPROLOL TARTRATE 2.5 MG: 5 INJECTION INTRAVENOUS at 13:13

## 2020-01-14 RX ADMIN — DOBUTAMINE 10 MCG/KG/MIN: 12.5 INJECTION, SOLUTION, CONCENTRATE INTRAVENOUS at 13:11

## 2020-01-16 DIAGNOSIS — R94.39 ABNORMAL STRESS ECHO: ICD-10-CM

## 2020-01-16 DIAGNOSIS — I25.10 CORONARY ARTERY DISEASE INVOLVING NATIVE CORONARY ARTERY OF NATIVE HEART WITHOUT ANGINA PECTORIS: Primary | ICD-10-CM

## 2020-01-16 RX ORDER — ASPIRIN 325 MG
325 TABLET ORAL ONCE
Status: CANCELLED | OUTPATIENT
Start: 2020-01-16 | End: 2020-01-16

## 2020-01-16 RX ORDER — SODIUM CHLORIDE 9 MG/ML
100 INJECTION, SOLUTION INTRAVENOUS CONTINUOUS
Status: CANCELLED | OUTPATIENT
Start: 2020-02-04

## 2020-01-16 RX ORDER — ASPIRIN 81 MG/1
325 TABLET ORAL DAILY
Status: CANCELLED | OUTPATIENT
Start: 2020-01-17

## 2020-01-16 NOTE — PROGRESS NOTES
Stress echo results to patient. Left heart recommended per Dr Browning, Dr Franklin agreeable to perform LHC. Patient made aware and will be called to schedule as soon as records are obtained from [facility]. Records have been requested.

## 2020-01-20 ENCOUNTER — DOCUMENTATION (OUTPATIENT)
Dept: CARDIOLOGY | Facility: CLINIC | Age: 70
End: 2020-01-20

## 2020-01-20 NOTE — PROGRESS NOTES
Heart cath scheduled for 2-4-20. Patient will need IV fluids pre cath. Patient given date and instructions. Patient verbalized understanding.

## 2020-02-03 ENCOUNTER — HOSPITAL ENCOUNTER (OUTPATIENT)
Dept: CT IMAGING | Facility: HOSPITAL | Age: 70
Discharge: HOME | End: 2020-02-03
Admitting: INTERNAL MEDICINE

## 2020-02-03 DIAGNOSIS — R91.1 LUNG NODULE: ICD-10-CM

## 2020-02-03 DIAGNOSIS — R79.89 ELEVATED SERUM CREATININE: Primary | ICD-10-CM

## 2020-02-03 PROCEDURE — 71250 CT THORAX DX C-: CPT

## 2020-02-03 RX ORDER — SODIUM CHLORIDE 9 MG/ML
150 INJECTION, SOLUTION INTRAVENOUS CONTINUOUS
Status: CANCELLED | OUTPATIENT
Start: 2020-02-04 | End: 2020-02-04

## 2020-02-04 ENCOUNTER — HOSPITAL ENCOUNTER (OUTPATIENT)
Facility: HOSPITAL | Age: 70
Setting detail: HOSPITAL OUTPATIENT SURGERY
Discharge: HOME | End: 2020-02-04
Attending: INTERNAL MEDICINE | Admitting: INTERNAL MEDICINE

## 2020-02-04 VITALS
TEMPERATURE: 98 F | HEART RATE: 50 BPM | DIASTOLIC BLOOD PRESSURE: 70 MMHG | SYSTOLIC BLOOD PRESSURE: 184 MMHG | WEIGHT: 229.72 LBS | RESPIRATION RATE: 18 BRPM | BODY MASS INDEX: 32.16 KG/M2 | HEIGHT: 71 IN | OXYGEN SATURATION: 100 %

## 2020-02-04 DIAGNOSIS — R94.39 ABNORMAL STRESS ECHO: ICD-10-CM

## 2020-02-04 DIAGNOSIS — R79.89 ELEVATED SERUM CREATININE: ICD-10-CM

## 2020-02-04 DIAGNOSIS — I25.10 CORONARY ARTERY DISEASE INVOLVING NATIVE CORONARY ARTERY OF NATIVE HEART WITHOUT ANGINA PECTORIS: ICD-10-CM

## 2020-02-04 LAB
ANION GAP SERPL CALCULATED.3IONS-SCNC: 12 MMOL/L (ref 5–15)
BUN BLD-MCNC: 20 MG/DL (ref 8–23)
BUN/CREAT SERPL: 14.1 (ref 7–25)
CALCIUM SPEC-SCNC: 9.8 MG/DL (ref 8.6–10.5)
CHLORIDE SERPL-SCNC: 100 MMOL/L (ref 98–107)
CO2 SERPL-SCNC: 27 MMOL/L (ref 22–29)
CREAT BLD-MCNC: 1.42 MG/DL (ref 0.76–1.27)
DEPRECATED RDW RBC AUTO: 44.6 FL (ref 37–54)
ERYTHROCYTE [DISTWIDTH] IN BLOOD BY AUTOMATED COUNT: 13.7 % (ref 12.3–15.4)
GFR SERPL CREATININE-BSD FRML MDRD: 50 ML/MIN/1.73
GLUCOSE BLD-MCNC: 151 MG/DL (ref 65–99)
HCT VFR BLD AUTO: 41.7 % (ref 37.5–51)
HGB BLD-MCNC: 14.5 G/DL (ref 13–17.7)
INR PPP: 0.97 (ref 0.8–1.2)
MCH RBC QN AUTO: 31.1 PG (ref 26.6–33)
MCHC RBC AUTO-ENTMCNC: 34.8 G/DL (ref 31.5–35.7)
MCV RBC AUTO: 89.5 FL (ref 79–97)
PLATELET # BLD AUTO: 143 10*3/MM3 (ref 140–450)
PMV BLD AUTO: 10.2 FL (ref 6–12)
POTASSIUM BLD-SCNC: 4 MMOL/L (ref 3.5–5.2)
PROTHROMBIN TIME: 12.7 SECONDS (ref 11.1–15.3)
RBC # BLD AUTO: 4.66 10*6/MM3 (ref 4.14–5.8)
SODIUM BLD-SCNC: 139 MMOL/L (ref 136–145)
WBC NRBC COR # BLD: 4.79 10*3/MM3 (ref 3.4–10.8)

## 2020-02-04 PROCEDURE — C1769 GUIDE WIRE: HCPCS | Performed by: INTERNAL MEDICINE

## 2020-02-04 PROCEDURE — 85610 PROTHROMBIN TIME: CPT | Performed by: INTERNAL MEDICINE

## 2020-02-04 PROCEDURE — 25010000002 HYDRALAZINE PER 20 MG: Performed by: INTERNAL MEDICINE

## 2020-02-04 PROCEDURE — 25010000002 FENTANYL CITRATE (PF) 100 MCG/2ML SOLUTION: Performed by: INTERNAL MEDICINE

## 2020-02-04 PROCEDURE — C1894 INTRO/SHEATH, NON-LASER: HCPCS | Performed by: INTERNAL MEDICINE

## 2020-02-04 PROCEDURE — 80048 BASIC METABOLIC PNL TOTAL CA: CPT | Performed by: INTERNAL MEDICINE

## 2020-02-04 PROCEDURE — 93459 L HRT ART/GRFT ANGIO: CPT | Performed by: INTERNAL MEDICINE

## 2020-02-04 PROCEDURE — 25010000002 FUROSEMIDE PER 20 MG: Performed by: INTERNAL MEDICINE

## 2020-02-04 PROCEDURE — 85027 COMPLETE CBC AUTOMATED: CPT | Performed by: INTERNAL MEDICINE

## 2020-02-04 PROCEDURE — 0 IOPAMIDOL PER 1 ML: Performed by: INTERNAL MEDICINE

## 2020-02-04 PROCEDURE — 25010000002 MIDAZOLAM PER 1 MG: Performed by: INTERNAL MEDICINE

## 2020-02-04 RX ORDER — HYDRALAZINE HYDROCHLORIDE 25 MG/1
25 TABLET, FILM COATED ORAL ONCE
Status: COMPLETED | OUTPATIENT
Start: 2020-02-04 | End: 2020-02-04

## 2020-02-04 RX ORDER — BISACODYL 5 MG/1
5 TABLET, DELAYED RELEASE ORAL DAILY PRN
Status: DISCONTINUED | OUTPATIENT
Start: 2020-02-04 | End: 2020-02-04 | Stop reason: HOSPADM

## 2020-02-04 RX ORDER — LIDOCAINE HYDROCHLORIDE 20 MG/ML
INJECTION, SOLUTION INFILTRATION; PERINEURAL AS NEEDED
Status: DISCONTINUED | OUTPATIENT
Start: 2020-02-04 | End: 2020-02-04 | Stop reason: HOSPADM

## 2020-02-04 RX ORDER — MIDAZOLAM HYDROCHLORIDE 1 MG/ML
INJECTION INTRAMUSCULAR; INTRAVENOUS AS NEEDED
Status: DISCONTINUED | OUTPATIENT
Start: 2020-02-04 | End: 2020-02-04 | Stop reason: HOSPADM

## 2020-02-04 RX ORDER — BISACODYL 10 MG
10 SUPPOSITORY, RECTAL RECTAL DAILY PRN
Status: DISCONTINUED | OUTPATIENT
Start: 2020-02-04 | End: 2020-02-04 | Stop reason: HOSPADM

## 2020-02-04 RX ORDER — FENTANYL CITRATE 50 UG/ML
INJECTION, SOLUTION INTRAMUSCULAR; INTRAVENOUS AS NEEDED
Status: DISCONTINUED | OUTPATIENT
Start: 2020-02-04 | End: 2020-02-04 | Stop reason: HOSPADM

## 2020-02-04 RX ORDER — HYDRALAZINE HYDROCHLORIDE 20 MG/ML
INJECTION INTRAMUSCULAR; INTRAVENOUS AS NEEDED
Status: DISCONTINUED | OUTPATIENT
Start: 2020-02-04 | End: 2020-02-04 | Stop reason: HOSPADM

## 2020-02-04 RX ORDER — AMOXICILLIN 250 MG
2 CAPSULE ORAL 2 TIMES DAILY
Status: DISCONTINUED | OUTPATIENT
Start: 2020-02-04 | End: 2020-02-04 | Stop reason: HOSPADM

## 2020-02-04 RX ORDER — SODIUM CHLORIDE 9 MG/ML
100 INJECTION, SOLUTION INTRAVENOUS CONTINUOUS
Status: DISCONTINUED | OUTPATIENT
Start: 2020-02-04 | End: 2020-02-04 | Stop reason: HOSPADM

## 2020-02-04 RX ORDER — SODIUM CHLORIDE 9 MG/ML
3 INJECTION, SOLUTION INTRAVENOUS CONTINUOUS
Status: SHIPPED | OUTPATIENT
Start: 2020-02-04 | End: 2020-02-04

## 2020-02-04 RX ORDER — AMLODIPINE BESYLATE 5 MG/1
5 TABLET ORAL ONCE
Status: COMPLETED | OUTPATIENT
Start: 2020-02-04 | End: 2020-02-04

## 2020-02-04 RX ORDER — ASPIRIN 325 MG
325 TABLET ORAL ONCE
Status: COMPLETED | OUTPATIENT
Start: 2020-02-04 | End: 2020-02-04

## 2020-02-04 RX ORDER — NITROGLYCERIN 5 MG/ML
INJECTION, SOLUTION INTRAVENOUS AS NEEDED
Status: DISCONTINUED | OUTPATIENT
Start: 2020-02-04 | End: 2020-02-04 | Stop reason: HOSPADM

## 2020-02-04 RX ORDER — SODIUM CHLORIDE 9 MG/ML
150 INJECTION, SOLUTION INTRAVENOUS CONTINUOUS
Status: ACTIVE | OUTPATIENT
Start: 2020-02-04 | End: 2020-02-04

## 2020-02-04 RX ORDER — FUROSEMIDE 10 MG/ML
40 INJECTION INTRAMUSCULAR; INTRAVENOUS ONCE
Status: COMPLETED | OUTPATIENT
Start: 2020-02-04 | End: 2020-02-04

## 2020-02-04 RX ORDER — CLONIDINE HYDROCHLORIDE 0.1 MG/1
0.1 TABLET ORAL ONCE
Status: COMPLETED | OUTPATIENT
Start: 2020-02-04 | End: 2020-02-04

## 2020-02-04 RX ORDER — LISINOPRIL 20 MG/1
20 TABLET ORAL DAILY
COMMUNITY
End: 2022-08-29

## 2020-02-04 RX ADMIN — CLONIDINE HYDROCHLORIDE 0.1 MG: 0.1 TABLET ORAL at 14:44

## 2020-02-04 RX ADMIN — SODIUM CHLORIDE 150 ML/HR: 9 INJECTION, SOLUTION INTRAVENOUS at 09:48

## 2020-02-04 RX ADMIN — AMLODIPINE BESYLATE 5 MG: 5 TABLET ORAL at 11:34

## 2020-02-04 RX ADMIN — ASPIRIN 325 MG: 325 TABLET ORAL at 09:49

## 2020-02-04 RX ADMIN — HYDRALAZINE HYDROCHLORIDE 25 MG: 25 TABLET, FILM COATED ORAL at 11:34

## 2020-02-04 RX ADMIN — FUROSEMIDE 40 MG: 10 INJECTION, SOLUTION INTRAMUSCULAR; INTRAVENOUS at 14:45

## 2020-02-04 NOTE — H&P
[facility]  HISTORY AND PHYSICAL  Conrado Cazares  69 y.o. male    Chief complaint -  Chest pain    History of Present Illness:  This is a 69-year-old gentleman with history of hypertension, hyperlipidemia, peripheral vascular disease, CKD, coronary artery disease status post CABG in 2007 with LIMA to LAD and SVG to RPL, was recently seen in office by Dr. Browning with chest pain.  Subsequently patient had an exercise echo which showed ischemia in the anterior anteroseptal wall and is here for cardiac catheterization for further evaluation.          Allergies   Allergen Reactions   • Dopamine Dizziness     Dropped BP          Past Medical History:   Diagnosis Date   • Anxiety    • COPD (chronic obstructive pulmonary disease) (CMS/[facility])    • Depression    • Dyslipidemia    • Emphysema of lung (CMS/[facility])    • Essential (primary) hypertension    • Essential hypertension    • GERD (gastroesophageal reflux disease)    • Hyperlipidemia    • Neuropathy     bilateral legs    • Nicotine dependence      other tobacco product, uncomplicated - vapor cig      • Other atherosclerosis of native arteries of extremities, bilateral legs (CMS/[facility])     with intermittent claudication   • Renal failure     stage 3   • Sleep apnea    • Tobacco dependence syndrome          Past Surgical History:   Procedure Laterality Date   • CARDIAC CATHETERIZATION     • CORONARY ARTERY BYPASS GRAFT     • GALLBLADDER SURGERY           Family History   Problem Relation Age of Onset   • Heart disease Other          Social History     Socioeconomic History   • Marital status:      Spouse name: Not on file   • Number of children: Not on file   • Years of education: Not on file   • Highest education level: Not on file   Tobacco Use   • Smoking status: Current Every Day Smoker     Types: Electronic Cigarette   • Smokeless tobacco: Never Used   Substance and Sexual Activity   • Alcohol use: No   • Drug use: No   • Sexual  activity: Defer         Prior to Admission medications    Medication Sig Start Date End Date Taking? Authorizing Provider   amLODIPine (NORVASC) 5 MG tablet Take 5 mg by mouth Daily.   Yes Todd Alexander MD   aspirin 325 MG tablet Take 325 mg by mouth Daily.   Yes Todd Alexander MD   atorvastatin (LIPITOR) 20 MG tablet Take 1 tablet by mouth Every Night. 10/21/19  Yes Cierra Franklin MD   Cholecalciferol (VITAMIN D3) 1000 units capsule Take 1,000 Units by mouth Daily.   Yes Todd Alexander MD   cilostazol (PLETAL) 100 MG tablet TAKE ONE TABLET BY MOUTH TWICE DAILY  Patient taking differently: Take 100 mg by mouth 2 (Two) Times a Day. 5/7/18  Yes Lesley Barksdale APRN   escitalopram (LEXAPRO) 20 MG tablet Take 10 mg by mouth Daily. 3 tablets by mouth daily   Yes Todd Alexander MD   gabapentin (NEURONTIN) 100 MG capsule Take 100 mg by mouth 2 (Two) Times a Day. 1 am 2 pm   Yes Todd Alexander MD   lisinopril (PRINIVIL,ZESTRIL) 20 MG tablet Take 20 mg by mouth Daily.   Yes Todd Alexander MD   nitroglycerin (NITROSTAT) 0.4 MG SL tablet Place 0.4 mg under the tongue Every 5 (Five) Minutes As Needed for Chest Pain. Take no more than 3 doses in 15 minutes.   Yes Todd Alexander MD   O2 (OXYGEN) Inhale 1 (One) Time. Oxygen at night   Yes Todd Alexander MD   omega-3 acid ethyl esters (LOVAZA) 1 G capsule Take 2 g by mouth 2 (Two) Times a Day.   Yes Todd Alexander MD   pantoprazole (PROTONIX) 40 MG EC tablet Take 40 mg by mouth Daily. 10/31/19  Yes Todd Alexander MD   primidone (MYSOLINE) 250 MG tablet Take  by mouth 2 (Two) Times a Day. 7/1/17  Yes Todd Alexander MD   propranolol LA (INDERAL LA) 160 MG 24 hr capsule Take 160 mg by mouth Daily. 7/1/17  Yes Todd Alexander MD   rOPINIRole (REQUIP) 1 MG tablet Take 2 mg by mouth Every Night. 7/1/17  Yes Todd Alexander MD   umeclidinium-vilanterol (ANORO ELLIPTA) 62.5-25 MCG/INH aerosol powder  " inhaler Inhale 1 puff Daily. 11/15/19  Yes Yolie Cazares MD   albuterol sulfate  (90 Base) MCG/ACT inhaler Inhale 2 puffs Every 4 (Four) Hours As Needed for Wheezing or Shortness of Air. 10/15/19   Yolie Cazares MD   bumetanide (BUMEX) 2 MG tablet Take 2 mg by mouth Daily.    Todd Alexander MD   CloNIDine (CATAPRES) 0.1 MG tablet Take 0.1 mg by mouth every night at bedtime. Take 1 tablet by mouth nightly for bp over 150    Todd Alexander MD   hydrALAZINE (APRESOLINE) 25 MG tablet Take 25 mg by mouth Daily As Needed. prn 4/15/19   Todd Alexander MD   clonazePAM (KlonoPIN) 1 MG tablet 0.5 mg At Night As Needed. 7/1/17 2/4/20  Todd Alexander MD   lisinopril (PRINIVIL,ZESTRIL) 10 MG tablet Take 20 mg by mouth Daily.  2/4/20  Todd Alexander MD         Review of Systems:     Constitution: Denies any fatigue, fever or chills.  HENT: Denies any headache, hearing impairment.  Eyes: Denies any blurring of vision, or photophobia.  Cardiovascular:  As per history of present illness.   Respiratory system: Denies any COPD, shortness of breath.  Endocrine:  No history of hyperlipidemia, diabetes.  Musculoskeletal:  No history of arthritis with musculoskeletal problems.  Gastrointestinal: No nausea, vomiting, or melena.  Genitourinary: No dysuria or hematuria.  Neurological: No history of seizure disorder, stroke, or memory problems.    Psychiatric/Behavioral: No history of depression, bipolar disorder or schizophrenia .    Hematological: No history of easy bruising.    ROS          OBJECTIVE:    BP (!) 208/93 (BP Location: Right arm)   Pulse 59   Temp 98.1 °F (36.7 °C)   Resp 20   Ht 180.3 cm (71\")   Wt 104 kg (229 lb 11.5 oz)   SpO2 96%   BMI 32.04 kg/m²       Physical Exam:   Constitutional: Patient is oriented to person, place, and time.   Skin: Warm and dry.  Well developed and nourished in no acute distress .  Head: Normocephalic and atraumatic.   Eyes: Pupils are " equal, round, and reactive to light.   Neck: Neck supple. No bruit in the carotids, no elevation of JVD.  Cardiovascular: [place] in the fifth intercostal space, regular rate, and rhythm,  S1 greater than S2, no S3 or S4, no gallop.  Pulmonary/Chest: Air entry is equal on both sides.  No wheezing or crackles.  Abdominal: Soft.  No hepatosplenomegaly, bowel sounds are present.  Musculoskeletal: No kyphoscoliosis.  Neurological: Alert and oriented to person, place, and time.  Cranial nerves are intact. No motor or sensory deficit.  Extremities: No edema, no radial femoral delay.  Psychiatric: Normal mood and affect. Behavior is normal.      Lab Results (last 24 hours)     Procedure Component Value Units Date/Time    Basic Metabolic Panel [[id number]]  (Abnormal) Collected:  02/04/20 0907    Specimen:  Blood Updated:  02/04/20 0938     Glucose 151 mg/dL      BUN 20 mg/dL      Creatinine 1.42 mg/dL      Sodium 139 mmol/L      Potassium 4.0 mmol/L      Chloride 100 mmol/L      CO2 27.0 mmol/L      Calcium 9.8 mg/dL      eGFR Non African Amer 50 mL/min/1.73      BUN/Creatinine Ratio 14.1     Anion Gap 12.0 mmol/L     Narrative:       GFR Normal >60  Chronic Kidney Disease <60  Kidney Failure <15      CBC (No Diff) [[id number]]  (Normal) Collected:  02/04/20 0907    Specimen:  Blood Updated:  02/04/20 0921     WBC 4.79 10*3/mm3      RBC 4.66 10*6/mm3      Hemoglobin 14.5 g/dL      Hematocrit 41.7 %      MCV 89.5 fL      MCH 31.1 pg      MCHC 34.8 g/dL      RDW 13.7 %      RDW-SD 44.6 fl      MPV 10.2 fL      Platelets 143 10*3/mm3     Protime-INR [[id number]]  (Normal) Collected:  02/04/20 0907    Specimen:  Blood Updated:  02/04/20 0929     Protime 12.7 Seconds      INR 0.97    Narrative:       Therapeutic range for most indications is 2.0-3.0 INR,  or 2.5-3.5 for mechanical heart valves.          Results for orders placed in visit on 01/02/20   Adult Stress Echo W/ Cont or Stress Agent if Necessary Per Protocol    Narrative ·  Resting left ventricle systolic function is normal at 51-55%.  · Target heart rate achieved with significant ST segment depression.  · Patient complained of chest pain at maximal stress and IV Lopressor was   administered with resolution of symptoms and ST segment depression.  · Patient had normal augmentation of the left ventricle. Stress LVEF   greater than 60%.  · Abnormal stress echo with echocardiographic evidence for myocardial   ischemia in the anterior and anteroseptal walls. This is a high-risk   stress echo.          The ASCVD Risk score (Nipton VIPUL Jr., et al., 2013) failed to calculate for the following reasons:    The valid systolic blood pressure range is 90 to 200 mmHg    Cannot find a previous HDL lab    Cannot find a previous total cholesterol lab          A/P:  [facility] Pre-Op:    Invasive coronary angiography was recommended to the patient.  The patientdenies  bleeding issues. The patient reports use of antiplatelet agents.  The patient reports CKD. The patient denies  contrast allergy. The patient reports use of diabetes medications.    Pre-Cath Surgical History: CABG with LIMA to LAD and SVG to RPL    The indications, risks/benefits and alternatives of diagnostic left heart cardiac catheterization, angiography, conscious sedation, and possible blood transfusion were discussed in detail with the patient. The potential complications of 1/2000 chance of death, 1/1000 chance of heart attack or stroke, 1/500 chance of bleeding or clotting of the femoral artery, and 1/500 chance of allergic reaction to contrast were discussed. We also reviewed possible complications of infection and kidney dysfunction. If PCI were performed and intra-coronary stents indicated, we discussed the details about KIRILL. This included a review of the risks of the infrequent, but relatively higher incidence of late thrombosis with KIRILL. The importance of maintaining a consistent daily regimen of aspirin and an additional anti-platelet  agent for as long as directed after implantation was emphasized. No contraindications were found. The patient  appeared to understand and agree to the above.  -Left heart catheterization, Coronary angiography, Graft angiography, In-situ LIMA angiography, Left ventriculography, Intravascular ultrasound, Optical coherence tomography, Flow wire, Balloon Angioplasty, Coronary stent, Graft stent, Aortography, Iliofemoral angiography, Device closure, femoral artery, Intra-aortic balloon pump, Impella LV assist device implantation, Transvenous pacemaker, Pericardiocentesis and Subclavian angiography    ASA Class: III  Mallampati Score: II    Recommended contrast: 100  Maximum contrast: 150    Contraindications to DAPT: None        Patient's Body mass index is 32.04 kg/m². BMI is above normal parameters. Recommendations include: exercise counseling and nutrition counseling.      Conrado Cazares  reports that he has been smoking electronic cigarette. He has never used smokeless tobacco.. I have educated him on the risk of diseases from using tobacco products such as cancer, COPD and heart diease.     I advised him to quit and he is not willing to quit.    I spent 3  minutes counseling the patient.        Cierra Franklin MD  2/4/2020  11:42 AM      Part of this note may be an electronic transcription/translation of spoken language to printed text using the Dragon Dictation System.

## 2020-02-04 NOTE — NURSING NOTE
Pt brought to cath lab post holding unit for arterial line pull.  BP on admit was 188/97.  Line hooked up to pressure bag.  Dr. Franklin gave orders for medication to reduce BP.  Medications given, see MAR.  Will continue to monitor BP until acceptable BP for arterial line pull.

## 2020-02-04 NOTE — DISCHARGE INSTRUCTIONS
Femoral Site Care  This sheet gives you information about how to care for yourself after your procedure. Your health care provider may also give you more specific instructions. If you have problems or questions, contact your health care provider.  What can I expect after the procedure?  After the procedure, it is common to have:  · Bruising that usually fades within 1-2 weeks.  · Tenderness at the site.  Follow these instructions at home:  Wound care  · Follow instructions from your health care provider about how to take care of your insertion site. Make sure you:  ? Wash your hands with soap and water before you change your bandage (dressing). If soap and water are not available, use hand .  ? Change your dressing as told by your health care provider.  ? Leave stitches (sutures), skin glue, or adhesive strips in place. These skin closures may need to stay in place for 2 weeks or longer. If adhesive strip edges start to loosen and curl up, you may trim the loose edges. Do not remove adhesive strips completely unless your health care provider tells you to do that.  · Do not take baths, swim, or use a hot tub until your health care provider approves.  · You may shower 24-48 hours after the procedure or as told by your health care provider.  ? Gently wash the site with plain soap and water.  ? Pat the area dry with a clean towel.  ? Do not rub the site. This may cause bleeding.  · Do not apply powder or lotion to the site. Keep the site clean and dry.  · Check your femoral site every day for signs of infection. Check for:  ? Redness, swelling, or pain.  ? Fluid or blood.  ? Warmth.  ? Pus or a bad smell.  Activity  · For the first 2-3 days after your procedure, or as long as directed:  ? Avoid climbing stairs as much as possible.  ? Do not squat.  · Do not lift anything that is heavier than 10 lb (4.5 kg), or the limit that you are told, until your health care provider says that it is safe.  · Rest as  directed.  ? Avoid sitting for a long time without moving. Get up to take short walks every 1-2 hours.  · Do not drive for 24 hours if you were given a medicine to help you relax (sedative).  General instructions  · Take over-the-counter and prescription medicines only as told by your health care provider.  · Keep all follow-up visits as told by your health care provider. This is important.  Contact a health care provider if you have:  · A fever or chills.  · You have redness, swelling, or pain around your insertion site.  Get help right away if:  · The catheter insertion area swells very fast.  · You pass out.  · You suddenly start to sweat or your skin gets clammy.  · The catheter insertion area is bleeding, and the bleeding does not stop when you hold steady pressure on the area.  · The area near or just beyond the catheter insertion site becomes pale, cool, tingly, or numb.  These symptoms may represent a serious problem that is an emergency. Do not wait to see if the symptoms will go away. Get medical help right away. Call your local emergency services (911 in the [place] Do not drive yourself to the hospital.  Summary  · After the procedure, it is common to have bruising that usually fades within 1-2 weeks.  · Check your femoral site every day for signs of infection.  · Do not lift anything that is heavier than 10 lb (4.5 kg), or the limit that you are told, until your health care provider says that it is safe.  This information is not intended to replace advice given to you by your health care provider. Make sure you discuss any questions you have with your health care provider.  Document Released: 08/21/2015 Document Revised: 12/31/2018 Document Reviewed: 12/31/2018  [employer] Interactive Patient Education © 2019 [employer]

## 2020-02-05 ENCOUNTER — DOCUMENTATION (OUTPATIENT)
Dept: CARDIAC REHAB | Facility: HOSPITAL | Age: 70
End: 2020-02-05

## 2020-02-05 PROBLEM — E66.09 CLASS 1 OBESITY DUE TO EXCESS CALORIES WITH SERIOUS COMORBIDITY AND BODY MASS INDEX (BMI) OF 31.0 TO 31.9 IN ADULT: Status: RESOLVED | Noted: 2019-09-06 | Resolved: 2020-02-05

## 2020-02-05 NOTE — PROGRESS NOTES
Pulmonary Office Follow-up    Subjective     Conrado Cazares is seen today at the office for   Chief Complaint   Patient presents with   • COPD   .    Subjective     History of Present Illness  Conrado Cazares is a 69 y.o. male with a PMH significant for COPD, past tobacco use, obesity, pulmonary hypertension, HALIMA intolerant of CPAP, ASCAD, hypertension, hyperlipidemia, and PVD who presents for follow-up of COPD.     10/15/19: Unfortunately, we were not able to perform PFTs so I recommended using albuterol only as needed and repeating a CT chest without contrast in February 2020 to follow the right lung nodule.  I also recommended he undergo overnight pulse oximetry to determine need for nocturnal oxygen and counseled him on the importance of complete tobacco cessation.    11/15/19: He had daily albuterol use I recommended starting Anoro daily as well as initiating nocturnal oxygen.  I did spend time counseling the patient on the importance of not using the electronic cigarette due to risk of progressive lung disease.    12/13/19: He was doing well on Anoro so encouraged him to continue with it.  I also recommended a repeat CT chest in early February to follow right middle lobe nodule.  I did  him again on the importance of complete e-cigarette cessation but he is not willing to quit.    2/6/20: Pt states he has had several tests recently.  He had a CT of the chest earlier this week and underwent left heart catheterization on Tuesday.  Patient was told that his coronary arteries had some narrowing, but there were no blockages.  Patient was also told to follow-up with his primary cardiologist sooner for blood pressure management and diuretics.  He states that his breathing remained stable on Anoro and he rarely needs his albuterol.  He has had a slight cough but is not bothersome and he denies any sputum or fevers.  Patient does continue to smoke the e-cigarette but has now committed to a quit  date of 3/29.  He is using his oxygen at nighttime.  Patient reports that his neurologist stopped his Klonopin and he is getting better rest now.      Tobacco use history:  Type: cigarettes and electronic cigarettes  Amount: 2 ppd  Duration: 50 years  Cessation: 3/ 29/2014; still using e-cigarette  Willing to quit: Yes      Review of Systems: History obtained from chart review and the patient.  Review of Systems   Constitutional: Positive for fatigue. Negative for fever and unexpected weight change.   HENT: Positive for hearing loss. Negative for congestion and postnasal drip.    Respiratory: Positive for shortness of breath. Negative for cough and wheezing.    Cardiovascular: Positive for leg swelling. Negative for chest pain.   Gastrointestinal: Negative for abdominal pain.   Musculoskeletal: Negative for arthralgias and back pain.     As described in the HPI. Otherwise, remainder of ROS (14 systems) were negative.    Patient Active Problem List   Diagnosis   • PVD (peripheral vascular disease) (CMS/HCC)   • Essential hypertension   • Mixed hyperlipidemia   • Reactive depression   • Neuropathy   • Left carotid bruit   • Electronic cigarette use   • Encounter for screening for lung cancer   • Stopped smoking with greater than 40 pack year history   • Class 1 obesity due to excess calories with serious comorbidity and body mass index (BMI) of 32.0 to 32.9 in adult   • Pulmonary hypertension (CMS/HCC)   • Nonrheumatic mitral valve regurgitation   • Nonrheumatic tricuspid valve regurgitation   • Nonrheumatic pulmonary valve insufficiency   • Coronary artery disease involving native coronary artery of native heart without angina pectoris   • Pericardial effusion   • Lung nodule   • HALIMA (obstructive sleep apnea)   • Stage 3 severe COPD by GOLD classification (CMS/HCC)   • Nocturnal hypoxemia   • Personal history of tobacco use, presenting hazards to health   • Dyspnea on exertion   • Abnormal stress echo         Current  Outpatient Medications:   •  albuterol sulfate  (90 Base) MCG/ACT inhaler, Inhale 2 puffs Every 4 (Four) Hours As Needed for Wheezing or Shortness of Air., Disp: 18 g, Rfl: 11  •  amLODIPine (NORVASC) 5 MG tablet, Take 5 mg by mouth Daily., Disp: , Rfl:   •  aspirin 325 MG tablet, Take 325 mg by mouth Daily., Disp: , Rfl:   •  atorvastatin (LIPITOR) 20 MG tablet, Take 1 tablet by mouth Every Night., Disp: 30 tablet, Rfl: 6  •  bumetanide (BUMEX) 2 MG tablet, Take 2 mg by mouth Daily., Disp: , Rfl:   •  Cholecalciferol (VITAMIN D3) 1000 units capsule, Take 1,000 Units by mouth Daily., Disp: , Rfl:   •  cilostazol (PLETAL) 100 MG tablet, TAKE ONE TABLET BY MOUTH TWICE DAILY (Patient taking differently: Take 100 mg by mouth 2 (Two) Times a Day.), Disp: 60 tablet, Rfl: 5  •  CloNIDine (CATAPRES) 0.1 MG tablet, Take 0.1 mg by mouth every night at bedtime. Take 1 tablet by mouth nightly for bp over 150, Disp: , Rfl:   •  escitalopram (LEXAPRO) 20 MG tablet, Take 10 mg by mouth Daily. 3 tablets by mouth daily, Disp: , Rfl:   •  gabapentin (NEURONTIN) 100 MG capsule, Take 100 mg by mouth 2 (Two) Times a Day. 1 am 2 pm, Disp: , Rfl:   •  hydrALAZINE (APRESOLINE) 25 MG tablet, Take 25 mg by mouth Daily As Needed. prn, Disp: , Rfl:   •  lisinopril (PRINIVIL,ZESTRIL) 20 MG tablet, Take 20 mg by mouth Daily., Disp: , Rfl:   •  nitroglycerin (NITROSTAT) 0.4 MG SL tablet, Place 0.4 mg under the tongue Every 5 (Five) Minutes As Needed for Chest Pain. Take no more than 3 doses in 15 minutes., Disp: , Rfl:   •  O2 (OXYGEN), Inhale 1 (One) Time. Oxygen at night, Disp: , Rfl:   •  omega-3 acid ethyl esters (LOVAZA) 1 G capsule, Take 2 g by mouth 2 (Two) Times a Day., Disp: , Rfl:   •  pantoprazole (PROTONIX) 40 MG EC tablet, Take 40 mg by mouth Daily., Disp: , Rfl:   •  primidone (MYSOLINE) 250 MG tablet, Take  by mouth 2 (Two) Times a Day., Disp: , Rfl:   •  propranolol LA (INDERAL LA) 160 MG 24 hr capsule, Take 160 mg by mouth  Daily., Disp: , Rfl:   •  rOPINIRole (REQUIP) 1 MG tablet, Take 2 mg by mouth Every Night., Disp: , Rfl:   •  umeclidinium-vilanterol (ANORO ELLIPTA) 62.5-25 MCG/INH aerosol powder  inhaler, Inhale 1 puff Daily., Disp: 1 each, Rfl: 11    Allergies   Allergen Reactions   • Dopamine Dizziness     Dropped BP        Past Medical History:   Diagnosis Date   • Anxiety    • Class 1 obesity due to excess calories with serious comorbidity and body mass index (BMI) of 31.0 to 31.9 in adult 9/6/2019   • COPD (chronic obstructive pulmonary disease) (CMS/[facility])    • Depression    • Dyslipidemia    • Emphysema of lung (CMS/[facility])    • Essential (primary) hypertension    • Essential hypertension    • GERD (gastroesophageal reflux disease)    • Hyperlipidemia    • Neuropathy     bilateral legs    • Nicotine dependence      other tobacco product, uncomplicated - vapor cig      • Other atherosclerosis of native arteries of extremities, bilateral legs (CMS/[facility])     with intermittent claudication   • Renal failure     stage 3   • Sleep apnea    • Tobacco dependence syndrome      Past Surgical History:   Procedure Laterality Date   • CARDIAC CATHETERIZATION     • CARDIAC CATHETERIZATION N/A 2/4/2020    Procedure: Left Heart Cath/PCI if indicated;  Surgeon: Cierra Franklin MD;  Location: [facility] CATH INVASIVE LOCATION;  Service: Cardiology;  Laterality: N/A;   • CORONARY ARTERY BYPASS GRAFT     • GALLBLADDER SURGERY       Social History     Socioeconomic History   • Marital status:      Spouse name: Not on file   • Number of children: Not on file   • Years of education: Not on file   • Highest education level: Not on file   Tobacco Use   • Smoking status: Current Every Day Smoker     Types: Electronic Cigarette   • Smokeless tobacco: Never Used   Substance and Sexual Activity   • Alcohol use: No   • Drug use: No   • Sexual activity: Defer     Family History   Problem Relation Age of Onset   • Heart disease Other           Objective  "    Blood pressure 152/74, pulse 64, height 180.3 cm (71\"), weight 101 kg (222 lb), SpO2 96 %.  Physical Exam   Constitutional: He is oriented to person, place, and time. Vital signs are normal. He appears well-developed and well-nourished.   Obese   HENT:   Head: Normocephalic and atraumatic.   Nose: Nose normal.   Mouth/Throat: Uvula is midline, oropharynx is clear and moist and mucous membranes are normal.   Mallampati 4   Eyes: Pupils are equal, round, and reactive to light. Conjunctivae, EOM and lids are normal.   Eyeglasses   Neck: Trachea normal and normal range of motion. No tracheal tenderness present. No thyroid mass present.   Cardiovascular: Normal rate, regular rhythm and normal heart sounds. PMI is not displaced. Exam reveals no gallop.   No murmur heard.  Pulmonary/Chest: Effort normal and breath sounds normal. No respiratory distress. He has no decreased breath sounds. He has no wheezes. He has no rhonchi. He exhibits no tenderness.   Abdominal: Soft. Normal appearance and bowel sounds are normal. There is no hepatomegaly. There is no tenderness.   Musculoskeletal:   Normal gait, mild BLE edema     Vascular Status -  His right foot exhibits abnormal foot edema. His left foot exhibits abnormal foot edema.  Lymphadenopathy:        Head (right side): No submandibular adenopathy present.        Head (left side): No submandibular adenopathy present.     He has no cervical adenopathy.        Right: No supraclavicular adenopathy present.        Left: No supraclavicular adenopathy present.   Neurological: He is alert and oriented to person, place, and time.   Skin: Skin is warm and dry. No rash noted. No cyanosis. Nails show no clubbing.   Psychiatric: He has a normal mood and affect. His speech is normal and behavior is normal. Judgment normal.   Nursing note and vitals reviewed.    PFTs: 11/15/19 (independently reviewed and interpreted by me)  Ratio 61  FVC 2.37/ 50%  FEV1 1.43/ 41%  DLCO 17.62/ " 52%  Severe obstruction with no significant bronchodilator response.  Moderately reduced diffusing capacity.  No comparative data available.       Radiology (independently reviewed and interpreted by me): LD CT 8/6/2019 showed 5 x 4 x 7 mm noncalcified nodule lateral right MIDDLE lobe, old granulomatous disease    CT chest without contrast 2/3/2020: Old granulomatous disease, stable right lower lobe superior segment 5 mm noncalcified nodule and stable 4.1 mm right middle lobe nodule, NACPD    [facility] 2/4/20:  1.  Two-vessel obstructive coronary artery disease involving the LAD and chronic total occlusion of the RCA.  2.  2 out of 2 grafts are patent. LIMA to LAD is small in caliber but patent.  SVG to RCA is widely patent.  3.  Moderately elevated left-sided filling pressures     Assessment/Plan     Conrado was seen today for copd.    Diagnoses and all orders for this visit:    Stage 3 severe COPD by GOLD classification (CMS/[facility])    Nocturnal hypoxemia    HALIMA (obstructive sleep apnea)    Lung nodule    Personal history of tobacco use, presenting hazards to health    Electronic cigarette use    Class 1 obesity due to excess calories with serious comorbidity and body mass index (BMI) of 32.0 to 32.9 in adult    Essential hypertension         Discussion/ Recommendations:   I personally reviewed his CT of the chest which showed stable lung nodules with no new findings.  Based on this, recommend that he go back into the lung cancer screening starting in February 2021.  Otherwise, he is doing well from a COPD standpoint on oral he remains well controlled.  His blood pressure is elevated today but he has already arranged follow-up with his cardiologist to discuss alterations in his medication.  Patient is now prepared to set a quit date for stopping smoking the e-cigarette.    -Continue Anoro daily.  -Continue albuterol as needed for dyspnea or wheeze.  -Resume lung cancer screening CT February 2021 (2ppd x 50yrs, still  smoking).  -Continue 2 L nocturnal oxygen via [employer].  -Monitor blood pressure and follow-up with cardiology as scheduled.  -Conrado Cazares  reports that he has been smoking electronic cigarette. He has never used smokeless tobacco.. I have educated him on the risk of diseases from using tobacco products such as cancer, COPD and heart diease. I advised him to quit and he is willing to quit. We have discussed the following method/s for tobacco cessation:  Education Material [place] OTC Cessation Products.  Together we have set a quit date for 3/29/20.  He will follow up with me in 3 months or sooner to check on his progress.I spent 4 minutes counseling the patient.  -Up-to-date with Pneumovax 23 and flu vaccines.     Patient's Body mass index is 30.96 kg/m². BMI is above normal parameters. Recommendations include: exercise counseling.           Return in about 3 months (around 5/6/2020) for Recheck COPD.      Thank you for allowing me to participate in the care of Conrado Cazares. Please do not hesitate to contact me with any questions.         This document has been electronically signed by Yolie Cazares MD on February 6, 2020 11:59 AM      Dictated using Dragon

## 2020-02-06 ENCOUNTER — OFFICE VISIT (OUTPATIENT)
Dept: PULMONOLOGY | Facility: CLINIC | Age: 70
End: 2020-02-06

## 2020-02-06 VITALS
HEART RATE: 64 BPM | BODY MASS INDEX: 31.08 KG/M2 | HEIGHT: 71 IN | SYSTOLIC BLOOD PRESSURE: 152 MMHG | OXYGEN SATURATION: 96 % | WEIGHT: 222 LBS | DIASTOLIC BLOOD PRESSURE: 74 MMHG

## 2020-02-06 DIAGNOSIS — Z78.9 ELECTRONIC CIGARETTE USE: ICD-10-CM

## 2020-02-06 DIAGNOSIS — Z87.891 PERSONAL HISTORY OF TOBACCO USE, PRESENTING HAZARDS TO HEALTH: ICD-10-CM

## 2020-02-06 DIAGNOSIS — R91.1 LUNG NODULE: ICD-10-CM

## 2020-02-06 DIAGNOSIS — E66.09 CLASS 1 OBESITY DUE TO EXCESS CALORIES WITH SERIOUS COMORBIDITY AND BODY MASS INDEX (BMI) OF 32.0 TO 32.9 IN ADULT: ICD-10-CM

## 2020-02-06 DIAGNOSIS — G47.34 NOCTURNAL HYPOXEMIA: ICD-10-CM

## 2020-02-06 DIAGNOSIS — J44.9 STAGE 3 SEVERE COPD BY GOLD CLASSIFICATION (HCC): Primary | ICD-10-CM

## 2020-02-06 DIAGNOSIS — I10 ESSENTIAL HYPERTENSION: ICD-10-CM

## 2020-02-06 DIAGNOSIS — G47.33 OSA (OBSTRUCTIVE SLEEP APNEA): ICD-10-CM

## 2020-02-06 PROCEDURE — 99214 OFFICE O/P EST MOD 30 MIN: CPT | Performed by: INTERNAL MEDICINE

## 2020-02-06 PROCEDURE — 99406 BEHAV CHNG SMOKING 3-10 MIN: CPT | Performed by: INTERNAL MEDICINE

## 2020-03-02 ENCOUNTER — OFFICE VISIT (OUTPATIENT)
Dept: CARDIOLOGY | Facility: CLINIC | Age: 70
End: 2020-03-02

## 2020-03-02 VITALS
OXYGEN SATURATION: 99 % | HEIGHT: 71 IN | DIASTOLIC BLOOD PRESSURE: 70 MMHG | HEART RATE: 62 BPM | SYSTOLIC BLOOD PRESSURE: 150 MMHG | WEIGHT: 224.4 LBS | BODY MASS INDEX: 31.42 KG/M2

## 2020-03-02 DIAGNOSIS — I37.1 NONRHEUMATIC PULMONARY VALVE INSUFFICIENCY: ICD-10-CM

## 2020-03-02 DIAGNOSIS — I27.20 PULMONARY HYPERTENSION (HCC): Primary | ICD-10-CM

## 2020-03-02 DIAGNOSIS — E66.09 CLASS 1 OBESITY DUE TO EXCESS CALORIES WITH SERIOUS COMORBIDITY AND BODY MASS INDEX (BMI) OF 32.0 TO 32.9 IN ADULT: ICD-10-CM

## 2020-03-02 DIAGNOSIS — I36.1 NONRHEUMATIC TRICUSPID VALVE REGURGITATION: ICD-10-CM

## 2020-03-02 DIAGNOSIS — E78.2 MIXED HYPERLIPIDEMIA: ICD-10-CM

## 2020-03-02 DIAGNOSIS — I34.0 NONRHEUMATIC MITRAL VALVE REGURGITATION: ICD-10-CM

## 2020-03-02 DIAGNOSIS — I31.39 PERICARDIAL EFFUSION: ICD-10-CM

## 2020-03-02 DIAGNOSIS — I10 ESSENTIAL HYPERTENSION: ICD-10-CM

## 2020-03-02 DIAGNOSIS — I25.10 CORONARY ARTERY DISEASE INVOLVING NATIVE CORONARY ARTERY OF NATIVE HEART WITHOUT ANGINA PECTORIS: ICD-10-CM

## 2020-03-02 PROCEDURE — 99214 OFFICE O/P EST MOD 30 MIN: CPT | Performed by: INTERNAL MEDICINE

## 2020-03-02 NOTE — PROGRESS NOTES
Cardiovascular Medicine      Vinay Browning M.D., Ph.D., [facility]             History of Present Illness  This is a 69 y.o. male with:    1. PAH  2. MR  3. TR  4. PI  5. Pericardial effusion  6. ASCAD with remote CABG, 2007  A. [facility] 2020 with 2/2 grafts patent  7. Risks: Obese, former smoker, PAD, HLD, HTN, CKD    Conrado Cazares is a 69 y.o. male who presents in follow-up.  I saw him to establish care for coronary artery disease.  Patient had a abnormal ischemia evaluation in the past after he complained of left arm pain and left shoulder pain.  Invasive coronary angiography in 2007 showed multivessel coronary artery disease.  He believes that he has a LIMA and possibly a saphenous vein graft to the circumflex.  When I last saw him he was prescribed aspirin, atorvastatin and propranolol.  He is had no resting, exertional or nocturnal angina.  He was complaining of significant exertional dyspnea and lower extremity edema.  He was sent for a 2D echocardiogram which showed pulmonary hypertension.  PA pressures were elevated.  The patient's right ventricle was dilated, but had normal RVEF.  He was found to have significant pulmonary insufficiency and tricuspid regurgitation.  He was also found to have mitral regurgitation.  He was also found to have trace pericardial effusion.  He is a heavy former smoker.  Unfortunately, he continues to vape a product with nicotine.  He does have CKD with proteinuria and this has been attributed as a cause of his lower extremity edema.  He remains on Bumex with nephrology. He did see Dr. Cazares and was diagnosed with COPD.  He started on inhalers for COPD.  His TTE showed an LVEF in the 40s.  This was concerning for potential heart failure with borderline ejection fraction.  This prompted an ischemia evaluation with a stress echocardiogram.  Unfortunately, this was a high risk study.  Patient underwent invasive coronary angiography recently.  Again, this was prompted by his  stress echocardiogram.  This showed severe two-vessel native disease.  The patient's LAD had a 80% focal ostial stenosis after the takeoff of the second diagonal.  There is filling of the LAD from the LIMA.  The patient's left circumflex had nonobstructive disease.  Patient's RCA was 100% in the proximal segment.  The LIMA to the LAD was patent.  The SVG to the RPDA retrogradely filled the RPLV system.  He was recommended to undergo optimal medical therapy. He has not had any recent usage of nitro. He is doing well since his [facility]. No complications. He has been cleaning up around the house. He tells me he found out that his wife is a hoarder. He does continue to vape with nicotine. He reports he is contemplative. He may attempt to stop later this month. This has been an ongoing issue. He has tried several times unsuccessfully in the past, but he is more motivated this time.         The following portions of the patient's history were reviewed and updated as appropriate: allergies, current medications, past family history, past medical history, past social history, past surgical history and problem list.      Review of Systems - Review of Systems   Cardiovascular: Positive for dyspnea on exertion and leg swelling. Negative for chest pain, claudication, cyanosis, irregular heartbeat, near-syncope, orthopnea, palpitations, paroxysmal nocturnal dyspnea and syncope.   Respiratory: Positive for shortness of breath. Negative for cough, hemoptysis and sleep disturbances due to breathing.         All other systems were reviewed and were negative.    family history includes Heart disease in an other family member.     reports that he has been smoking electronic cigarette. He has never used smokeless tobacco. He reports that he does not drink alcohol or use drugs.    Allergies   Allergen Reactions   • Dopamine Dizziness     Dropped BP          Current Outpatient Medications:   •  albuterol sulfate  (90 Base) MCG/ACT  inhaler, Inhale 2 puffs Every 4 (Four) Hours As Needed for Wheezing or Shortness of Air., Disp: 18 g, Rfl: 11  •  amLODIPine (NORVASC) 5 MG tablet, Take 5 mg by mouth Daily., Disp: , Rfl:   •  aspirin 325 MG tablet, Take 325 mg by mouth Daily., Disp: , Rfl:   •  atorvastatin (LIPITOR) 20 MG tablet, Take 1 tablet by mouth Every Night., Disp: 30 tablet, Rfl: 6  •  bumetanide (BUMEX) 2 MG tablet, Take 2 mg by mouth Daily., Disp: , Rfl:   •  Cholecalciferol (VITAMIN D3) 1000 units capsule, Take 1,000 Units by mouth Daily., Disp: , Rfl:   •  cilostazol (PLETAL) 100 MG tablet, TAKE ONE TABLET BY MOUTH TWICE DAILY (Patient taking differently: Take 100 mg by mouth 2 (Two) Times a Day.), Disp: 60 tablet, Rfl: 5  •  CloNIDine (CATAPRES) 0.1 MG tablet, Take 0.1 mg by mouth every night at bedtime. Take 1 tablet by mouth nightly for bp over 150, Disp: , Rfl:   •  escitalopram (LEXAPRO) 20 MG tablet, Take 10 mg by mouth Daily. 3 tablets by mouth daily, Disp: , Rfl:   •  gabapentin (NEURONTIN) 100 MG capsule, Take 100 mg by mouth 2 (Two) Times a Day. 1 am 2 pm, Disp: , Rfl:   •  hydrALAZINE (APRESOLINE) 25 MG tablet, Take 25 mg by mouth Daily As Needed. prn, Disp: , Rfl:   •  lisinopril (PRINIVIL,ZESTRIL) 20 MG tablet, Take 20 mg by mouth Daily., Disp: , Rfl:   •  nitroglycerin (NITROSTAT) 0.4 MG SL tablet, Place 0.4 mg under the tongue Every 5 (Five) Minutes As Needed for Chest Pain. Take no more than 3 doses in 15 minutes., Disp: , Rfl:   •  O2 (OXYGEN), Inhale 1 (One) Time. Oxygen at night, Disp: , Rfl:   •  omega-3 acid ethyl esters (LOVAZA) 1 G capsule, Take 2 g by mouth 2 (Two) Times a Day., Disp: , Rfl:   •  pantoprazole (PROTONIX) 40 MG EC tablet, Take 40 mg by mouth Daily., Disp: , Rfl:   •  primidone (MYSOLINE) 250 MG tablet, Take  by mouth 2 (Two) Times a Day., Disp: , Rfl:   •  propranolol LA (INDERAL LA) 160 MG 24 hr capsule, Take 160 mg by mouth Daily., Disp: , Rfl:   •  rOPINIRole (REQUIP) 1 MG tablet, Take 2 mg by  "mouth Every Night., Disp: , Rfl:   •  umeclidinium-vilanterol (ANORO ELLIPTA) 62.5-25 MCG/INH aerosol powder  inhaler, Inhale 1 puff Daily., Disp: 1 each, Rfl: 11      Physical Exam:  Vitals:    03/02/20 1053   BP: 150/70   BP Location: Left arm   Patient Position: Sitting   Cuff Size: Adult   Pulse: 62   SpO2: 99%   Weight: 102 kg (224 lb 6.4 oz)   Height: 180.3 cm (71\")   PainSc: 0-No pain     Pulse Ox: Normal  on room air  General: alert, appears stated age and cooperative     Body Habitus: obese    HEENT: Head: Normocephalic, no lesions, without obvious abnormality. No arcus senilis, xanthelasma or xanthomas.    JVP: Volume/Pulsation: Normal.  Normal waveforms.   Appropriate inspiratory decrease.  No Kussmaul's. No Janis's.     Precordium: Normal impulses. P2 is not palpable.  RV Heave: absent  LV Heave: absent  Meldrim:  normal size and placement  Palpable S4: absent.  Heart rate: normal    Heart Rhythm: regular     Heart Sounds: S1: normal intensity  S2: normal intensity  S3: absent   S4: absent  Opening Snap: absent    A2-OS:  no  Pericardial Rub:  Absent   Ejection click: None      Murmurs:  absent   Extremity: moves all extremities equally.       DATA REVIEWED:     Results for orders placed in visit on 01/02/20   Adult Stress Echo W/ Cont or Stress Agent if Necessary Per Protocol    Narrative · Resting left ventricle systolic function is normal at 51-55%.  · Target heart rate achieved with significant ST segment depression.  · Patient complained of chest pain at maximal stress and IV Lopressor was   administered with resolution of symptoms and ST segment depression.  · Patient had normal augmentation of the left ventricle. Stress LVEF   greater than 60%.  · Abnormal stress echo with echocardiographic evidence for myocardial   ischemia in the anterior and anteroseptal walls. This is a high-risk   stress echo.                   Lab Results   Component Value Date    HGBA1C 6.7 (H) 07/15/2019     No results " found for: DDIMER  No results found for: ALT  Lab Results   Component Value Date    HGBA1C 6.7 (H) 07/15/2019    HGBA1C 7.1 (H) 04/17/2019       Assessment/Plan      1. Pulmonary hypertension (CMS/HCC). PAH/PVH. WHO Group 3; FC: III.  RV status: Abnormal:.   Patient appears euvolemic. Perfusion status: good.  Last 6MWT: Pending.  I discussed with him today that he likely has a pulmonary etiology for his PAH, likely COPD.  I recommended a thorough pulmonary work-up.  Also possible that he has an element of HFbEF: His LVEF is 41-45%.    -Discussed evaluation, treatment and usual course.  -No current indication for PAH-specific medications  -No compelling indication at this time for RHC  -Will continue active clinical surveillance.  Signs and symptoms of worsening PAH and RV failure were discussed.  -I have asked the patient that if they were to move to be certain they see someone with expertise in PAH. These providers can be located at [email or web address].    2. Nonrheumatic mitral valve regurgitation/Nonrheumatic tricuspid valve regurgitation/Nonrheumatic pulmonary valve insufficiency. ACC stage B. There are no surgical indications at this time. Signs and symptoms of worsening valve disease discussed.  I've asked the patient contact me for an earlier appointment if these develop. The patient has been advised to remain in clinical surveillance. I've recommended a repeat 2D TTE every 1 year with the next TTE due: 9/2020.  No indication based on 2017 ACC/AHA guidelines for IE prophylaxis for dental procedures: Optimal oral health is recommended through regular professional dental care and the use of appropriate dental products, such as manual, powered, and ultrasonic toothbrushes; dental floss; and other plaque-removal devices    3. Coronary artery disease involving native coronary artery of native heart without angina pectoris. No anginal symptoms. The patient has been revascularized.  Revascularization:  CABG. the  patient had a abnormal stress echo in 2020.  Invasive coronary angiography confirmed severe native vessel disease in the LAD and a  of the RCA.  The LIMA was patent.  The vein graft to the PDA was patent.  -Propranolol  -ASA--81mg  -Lipitor (atorvastatin)  -SL nitro prn  -Call 911 immediately for concerning symptoms.    4.  Pericardial effusion.The prevalence of pericardial effusion is about 3%.  The patient is currently Asymptomatic.  The size of the effusion is small.  I briefly discussed the etiology of pericardial effusions.  A handout was also offered to the patient which explained the disease process, including signs of worsening pericardial effusion.  At this point I have counseled conservative management.   -Will repeat a 2D TTE in 6 months  -Please call for worsening signs or symptoms.    5. Cardiac Risk Assessments based on 2019 ACCF guidelines:  A team-based care approach is recommended for the control of risk factors associated with ASCAD.  As such, Conrado Cazares was requested to have ongoing follow-up with their PCP. A PCP can provide the detailed monitoring that is required for management of risk factors such as essential HTN. Essential HTN is a significant risk factor for stroke, heart disease and vascular disease. I've recommended the patient continue current medications, if any, as prescribed by the primary care provider. I recommended they have close follow-up for ongoing mgmt of this and the medical comorbidities associated with HTN with their PCP.  A diet emphasizing intake of vegetables, fruits, nuts, whole grains and fish is recommended. Physical activity recommendations were provided by literature. They were also provided with information regarding maintaining a healthy weight, heart-healthy dietary pattern and DASH information.  Goal blood pressure less than 130/80.  The patient's BMI is recommended to be calculated at least annually.  The patient's BMI is Body mass index is 31.3  kg/m²..  Tobacco status is assessed at every visit based on established guidelines.  The patient's nicotine status: is a current smoker The patient smokes vape with nicotine. The patient is contemplative. Cessation advised. Risks discussed. Hand-out given. Time: 3 minutes.     Return in about 7 months (around 10/2/2020).

## 2020-03-02 NOTE — PATIENT INSTRUCTIONS
Preventing Unhealthy Weight Gain, Adult  Staying at a healthy weight is important to your overall health. When fat builds up in your body, you may become overweight or obese. Being overweight or obese increases your risk of developing certain health problems, such as heart disease, diabetes, sleeping problems, joint problems, and some types of cancer.  Unhealthy weight gain is often the result of making unhealthy food choices or not getting enough exercise. You can make changes to your lifestyle to prevent obesity and stay as healthy as possible.  What nutrition changes can be made?    · Eat only as much as your body needs. To do this:  ? Pay attention to signs that you are hungry or full. Stop eating as soon as you feel full.  ? If you feel hungry, try drinking water first before eating. Drink enough water so your urine is clear or pale yellow.  ? Eat smaller portions. Pay attention to portion sizes when eating out.  ? Look at serving sizes on food labels. Most foods contain more than one serving per container.  ? Eat the recommended number of calories for your gender and activity level. For most active people, a daily total of 2,000 calories is appropriate. If you are trying to lose weight or are not very active, you may need to eat fewer calories. Talk with your health care provider or a diet and nutrition specialist (dietitian) about how many calories you need each day.  · Choose healthy foods, such as:  ? Fruits and vegetables. At each meal, try to fill at least half of your plate with fruits and vegetables.  ? Whole grains, such as whole-wheat bread, brown rice, and quinoa.  ? Lean meats, such as chicken or fish.  ? Other healthy proteins, such as beans, eggs, or tofu.  ? Healthy fats, such as nuts, seeds, fatty fish, and olive oil.  ? Low-fat or fat-free dairy products.  · Check food labels, and avoid food and drinks that:  ? Are high in calories.  ? Have added sugar.  ? Are high in sodium.  ? Have saturated  fats or trans fats.  · Cook foods in healthier ways, such as by baking, broiling, or grilling.  · Make a meal plan for the week, and shop with a grocery list to help you stay on track with your purchases. Try to avoid going to the grocery store when you are hungry.  · When grocery shopping, try to shop around the outside of the store first, where the fresh foods are. Doing this helps you to avoid prepackaged foods, which can be high in sugar, salt (sodium), and fat.  What lifestyle changes can be made?    · Exercise for 30 or more minutes on 5 or more days each week. Exercising may include brisk walking, yard work, biking, running, swimming, and team sports like basketball and soccer. Ask your health care provider which exercises are safe for you.  · Do muscle-strengthening activities, such as lifting weights or using resistance bands, on 2 or more days a week.  · Do not use any products that contain nicotine or tobacco, such as cigarettes and e-cigarettes. If you need help quitting, ask your health care provider.  · Limit alcohol intake to no more than 1 drink a day for nonpregnant women and 2 drinks a day for men. One drink equals 12 oz of beer, 5 oz of wine, or 1½ oz of hard liquor.  · Try to get 7-9 hours of sleep each night.  What other changes can be made?  · Keep a food and activity journal to keep track of:  ? What you ate and how many calories you had. Remember to count the calories in sauces, dressings, and side dishes.  ? Whether you were active, and what exercises you did.  ? Your calorie, weight, and activity goals.  · Check your weight regularly. Track any changes. If you notice you have gained weight, make changes to your diet or activity routine.  · Avoid taking weight-loss medicines or supplements. Talk to your health care provider before starting any new medicine or supplement.  · Talk to your health care provider before trying any new diet or exercise plan.  Why are these changes  important?  Eating healthy, staying active, and having healthy habits can help you to prevent obesity. Those changes also:  · Help you manage stress and emotions.  · Help you connect with friends and family.  · Improve your self-esteem.  · Improve your sleep.  · Prevent long-term health problems.  What can happen if changes are not made?  Being obese or overweight can cause you to develop joint or bone problems, which can make it hard for you to stay active or do activities you enjoy. Being obese or overweight also puts stress on your heart and lungs and can lead to health problems like diabetes, heart disease, and some cancers.  Where to find more information  Talk with your health care provider or a dietitian about healthy eating and healthy lifestyle choices. You may also find information from:  · [place] Department of Agriculture, MyPlate: www.[employer].commyplate.gov  · American Heart Association: www.heart.org  · Centers for Disease Control and Prevention: www.cdc.gov  Summary  · Staying at a healthy weight is important to your overall health. It helps you to prevent certain diseases and health problems, such as heart disease, diabetes, joint problems, sleep disorders, and some types of cancer.  · Being obese or overweight can cause you to develop joint or bone problems, which can make it hard for you to stay active or do activities you enjoy.  · You can prevent unhealthy weight gain by eating a healthy diet, exercising regularly, not smoking, limiting alcohol, and getting enough sleep.  · Talk with your health care provider or a dietitian for guidance about healthy eating and healthy lifestyle choices.  This information is not intended to replace advice given to you by your health care provider. Make sure you discuss any questions you have with your health care provider.  Document Released: 12/19/2017 Document Revised: 09/28/2018 Document Reviewed: 01/24/2018  [employer] Interactive Patient Education © 2020 [employer]      Steps to Quit Smoking    Smoking tobacco can be harmful to your health and can affect almost every organ in your body. Smoking puts you, and those around you, at risk for developing many serious chronic diseases. Quitting smoking is difficult, but it is one of the best things that you can do for your health. It is never too late to quit.  What are the benefits of quitting smoking?  When you quit smoking, you lower your risk of developing serious diseases and conditions, such as:  · Lung cancer or lung disease, such as COPD.  · Heart disease.  · Stroke.  · Heart attack.  · Infertility.  · Osteoporosis and bone fractures.  Additionally, symptoms such as coughing, wheezing, and shortness of breath may get better when you quit. You may also find that you get sick less often because your body is stronger at fighting off colds and infections. If you are pregnant, quitting smoking can help to reduce your chances of having a baby of low birth weight.  How do I get ready to quit?  When you decide to quit smoking, create a plan to make sure that you are successful. Before you quit:  · Pick a date to quit. Set a date within the next two weeks to give you time to prepare.  · Write down the reasons why you are quitting. Keep this list in places where you will see it often, such as on your bathroom mirror or in your car or wallet.  · Identify the people, places, things, and activities that make you want to smoke (triggers) and avoid them. Make sure to take these actions:  ? Throw away all cigarettes at home, at work, and in your car.  ? Throw away smoking accessories, such as ashtrays and lighters.  ? Clean your car and make sure to empty the ashtray.  ? Clean your home, including curtains and carpets.  · Tell your family, friends, and coworkers that you are quitting. Support from your loved ones can make quitting easier.  · Talk with your health care provider about your options for quitting smoking.  · Find out what  treatment options are covered by your health insurance.  What strategies can I use to quit smoking?  Talk with your healthcare provider about different strategies to quit smoking. Some strategies include:  · Quitting smoking altogether instead of gradually lessening how much you smoke over a period of time. Research shows that quitting “cold turkey” is more successful than gradually quitting.  · Attending in-person counseling to help you build problem-solving skills. You are more likely to have success in quitting if you attend several counseling sessions. Even short sessions of 10 minutes can be effective.  · Finding resources and support systems that can help you to quit smoking and remain smoke-free after you quit. These resources are most helpful when you use them often. They can include:  ? Online chats with a counselor.  ? Telephone quitlines.  ? Printed self-help materials.  ? Support groups or group counseling.  ? Text messaging programs.  ? Mobile phone applications.  · Taking medicines to help you quit smoking. (If you are pregnant or breastfeeding, talk with your health care provider first.) Some medicines contain nicotine and some do not. Both types of medicines help with cravings, but the medicines that include nicotine help to relieve withdrawal symptoms. Your health care provider may recommend:  ? Nicotine patches, gum, or lozenges.  ? Nicotine inhalers or sprays.  ? Non-nicotine medicine that is taken by mouth.  Talk with your health care provider about combining strategies, such as taking medicines while you are also receiving in-person counseling. Using these two strategies together makes you more likely to succeed in quitting than if you used either strategy on its own.  If you are pregnant or breastfeeding, talk with your health care provider about finding counseling or other support strategies to quit smoking. Do not take medicine to help you quit smoking unless told to do so by your health care  provider.  What things can I do to make it easier to quit?  Quitting smoking might feel overwhelming at first, but there is a lot that you can do to make it easier. Take these important actions:  · Reach out to your family and friends and ask that they support and encourage you during this time. Call telephone quitlines, reach out to support groups, or work with a counselor for support.  · Ask people who smoke to avoid smoking around you.  · Avoid places that trigger you to smoke, such as bars, parties, or smoke-break areas at work.  · Spend time around people who do not smoke.  · Lessen stress in your life, because stress can be a smoking trigger for some people. To lessen stress, try:  ? Exercising regularly.  ? Deep-breathing exercises.  ? Yoga.  ? Meditating.  ? Performing a body scan. This involves closing your eyes, scanning your body from head to toe, and noticing which parts of your body are particularly tense. Purposefully relax the muscles in those areas.  · Download or purchase mobile phone or tablet apps (applications) that can help you stick to your quit plan by providing reminders, tips, and encouragement. There are many free apps, such as QuitGuide from the CDC (Centers for Disease Control and Prevention). You can find other support for quitting smoking (smoking cessation) through smokefree.gov and other websites.  How will I feel when I quit smoking?  Within the first 24 hours of quitting smoking, you may start to feel some withdrawal symptoms. These symptoms are usually most noticeable 2-3 days after quitting, but they usually do not last beyond 2-3 weeks. Changes or symptoms that you might experience include:  · Mood swings.  · Restlessness, anxiety, or irritation.  · Difficulty concentrating.  · Dizziness.  · Strong cravings for sugary foods in addition to nicotine.  · Mild weight gain.  · Constipation.  · Nausea.  · Coughing or a sore throat.  · Changes in how your medicines work in your  body.  · A depressed mood.  · Difficulty sleeping (insomnia).  After the first 2-3 weeks of quitting, you may start to notice more positive results, such as:  · Improved sense of smell and taste.  · Decreased coughing and sore throat.  · Slower heart rate.  · Lower blood pressure.  · Clearer skin.  · The ability to breathe more easily.  · Fewer sick days.  Quitting smoking is very challenging for most people. Do not get discouraged if you are not successful the first time. Some people need to make many attempts to quit before they achieve long-term success. Do your best to stick to your quit plan, and talk with your health care provider if you have any questions or concerns.  This information is not intended to replace advice given to you by your health care provider. Make sure you discuss any questions you have with your health care provider.  Document Released: 12/12/2002 Document Revised: 07/24/2018 Document Reviewed: 05/03/2016  [employer] Interactive Patient Education © 2020 [employer]

## 2020-04-20 RX ORDER — ATORVASTATIN CALCIUM 20 MG/1
20 TABLET, FILM COATED ORAL NIGHTLY
Qty: 30 TABLET | Refills: 6 | Status: SHIPPED | OUTPATIENT
Start: 2020-04-20 | End: 2020-07-24

## 2020-06-22 NOTE — PROGRESS NOTES
Pulmonary Office Follow-up    Subjective     Conrado Cazares is seen today at the office for   Chief Complaint   Patient presents with   • COPD   .    Subjective     History of Present Illness  Conrado Cazares is a 70 y.o. male with a PMH significant for COPD, past tobacco use, obesity, pulmonary hypertension, HALIMA intolerant of CPAP, ASCAD, hypertension, hyperlipidemia, and PVD who presents for follow-up of COPD.     10/15/19: Unfortunately, we were not able to perform PFTs so I recommended using albuterol only as needed and repeating a CT chest without contrast in February 2020 to follow the right lung nodule.  I also recommended he undergo overnight pulse oximetry to determine need for nocturnal oxygen and counseled him on the importance of complete tobacco cessation.    11/15/19: He had daily albuterol use I recommended starting Anoro daily as well as initiating nocturnal oxygen.  I did spend time counseling the patient on the importance of not using the electronic cigarette due to risk of progressive lung disease.    12/13/19: He was doing well on Anoro so encouraged him to continue with it.  I also recommended a repeat CT chest in early February to follow right middle lobe nodule.  I did  him again on the importance of complete e-cigarette cessation but he is not willing to quit.    2/6/20: I reviewed his CT which showed stable lung nodules with no new findings I recommended defaulting into lung cancer screening in February 2021.  Otherwise, he was stable on Anoro as well as supplemental oxygen.  I did encourage him to stop using electronic cigarette he set a quit date for 3/29/2020.    6/23/20: He states that he continues to do well on Anoro and rarely needs to use his albuterol.  Patient does have occasional cough, but is not bothersome.  He states that usually occurs after he has been out mowing the grass but he denies any allergy issues.  He has been trying to stay active and states  that he mows about 2 acres a week as well as doing other outdoor activities.  Unfortunately, he was not able to stop using the e-cigarette but he is planning to continue cutting back.  He has not picked another quit date.  He is wearing his oxygen at night but states he has difficulty sleeping due to the noise his compressor makes.  He denies any recent antibiotics or steroid use.  His blood pressure is elevated today but he states he has been taking his antihypertensives as ordered.  He denies chest pain or headaches.      Tobacco use history:  Type: cigarettes and electronic cigarettes  Amount: 2 ppd  Duration: 50 years  Cessation: 3/ 29/2014; still using e-cigarette  Willing to quit: Yes      Review of Systems: History obtained from chart review and the patient.  Review of Systems   Constitutional: Negative for fever and unexpected weight change.   HENT: Positive for hearing loss. Negative for congestion and postnasal drip.    Respiratory: Positive for shortness of breath. Negative for cough and wheezing.    Cardiovascular: Positive for leg swelling. Negative for chest pain.   Gastrointestinal: Negative for abdominal pain.   Musculoskeletal: Negative for arthralgias and back pain.     As described in the HPI. Otherwise, remainder of ROS (14 systems) were negative.    Patient Active Problem List   Diagnosis   • PVD (peripheral vascular disease) (CMS/HCC)   • Essential hypertension   • Mixed hyperlipidemia   • Reactive depression   • Neuropathy   • Left carotid bruit   • Electronic cigarette use   • Encounter for screening for lung cancer   • Stopped smoking with greater than 40 pack year history   • Class 1 obesity due to excess calories with serious comorbidity and body mass index (BMI) of 32.0 to 32.9 in adult   • Pulmonary hypertension (CMS/HCC)   • Nonrheumatic mitral valve regurgitation   • Nonrheumatic tricuspid valve regurgitation   • Nonrheumatic pulmonary valve insufficiency   • Coronary artery disease  involving native coronary artery of native heart without angina pectoris   • Pericardial effusion   • Lung nodule   • HALIMA (obstructive sleep apnea)   • Stage 3 severe COPD by GOLD classification (CMS/HCC)   • Nocturnal hypoxemia   • Personal history of tobacco use, presenting hazards to health   • Dyspnea on exertion   • Abnormal stress echo         Current Outpatient Medications:   •  albuterol sulfate  (90 Base) MCG/ACT inhaler, Inhale 2 puffs Every 4 (Four) Hours As Needed for Wheezing or Shortness of Air., Disp: 18 g, Rfl: 11  •  amLODIPine (NORVASC) 5 MG tablet, Take 5 mg by mouth Daily., Disp: , Rfl:   •  aspirin 81 MG tablet, Take 1 tablet by mouth Daily., Disp: 30 tablet, Rfl: 11  •  atorvastatin (LIPITOR) 20 MG tablet, Take 1 tablet by mouth Every Night., Disp: 30 tablet, Rfl: 6  •  bumetanide (BUMEX) 2 MG tablet, Take 2 mg by mouth Daily., Disp: , Rfl:   •  Cholecalciferol (VITAMIN D3) 1000 units capsule, Take 1,000 Units by mouth Daily., Disp: , Rfl:   •  cilostazol (PLETAL) 100 MG tablet, TAKE ONE TABLET BY MOUTH TWICE DAILY (Patient taking differently: Take 100 mg by mouth 2 (Two) Times a Day.), Disp: 60 tablet, Rfl: 5  •  escitalopram (LEXAPRO) 20 MG tablet, Take 10 mg by mouth Daily. 3 tablets by mouth daily, Disp: , Rfl:   •  gabapentin (NEURONTIN) 100 MG capsule, Take 100 mg by mouth 2 (Two) Times a Day. 1 am 2 pm, Disp: , Rfl:   •  lisinopril (PRINIVIL,ZESTRIL) 20 MG tablet, Take 20 mg by mouth Daily., Disp: , Rfl:   •  nitroglycerin (NITROSTAT) 0.4 MG SL tablet, Place 0.4 mg under the tongue Every 5 (Five) Minutes As Needed for Chest Pain. Take no more than 3 doses in 15 minutes., Disp: , Rfl:   •  O2 (OXYGEN), Inhale 1 (One) Time. Oxygen at night, Disp: , Rfl:   •  omega-3 acid ethyl esters (LOVAZA) 1 G capsule, Take 2 g by mouth 2 (Two) Times a Day., Disp: , Rfl:   •  pantoprazole (PROTONIX) 40 MG EC tablet, Take 40 mg by mouth Daily., Disp: , Rfl:   •  primidone (MYSOLINE) 250 MG tablet,  Take  by mouth 2 (Two) Times a Day., Disp: , Rfl:   •  propranolol LA (INDERAL LA) 160 MG 24 hr capsule, Take 160 mg by mouth Daily., Disp: , Rfl:   •  rOPINIRole (REQUIP) 1 MG tablet, Take 2 mg by mouth Every Night., Disp: , Rfl:   •  umeclidinium-vilanterol (ANORO ELLIPTA) 62.5-25 MCG/INH aerosol powder  inhaler, Inhale 1 puff Daily., Disp: 1 each, Rfl: 11  •  CloNIDine (CATAPRES) 0.1 MG tablet, Take 0.1 mg by mouth every night at bedtime. Take 1 tablet by mouth nightly for bp over 150, Disp: , Rfl:     Allergies   Allergen Reactions   • Dopamine Dizziness     Dropped BP        Past Medical History:   Diagnosis Date   • Anxiety    • Class 1 obesity due to excess calories with serious comorbidity and body mass index (BMI) of 31.0 to 31.9 in adult 9/6/2019   • COPD (chronic obstructive pulmonary disease) (CMS/HCC)    • Dehydration    • Depression    • Dyslipidemia    • Emphysema of lung (CMS/[facility])    • Essential (primary) hypertension    • Essential hypertension    • GERD (gastroesophageal reflux disease)    • Heat exhaustion    • Hyperlipidemia    • Neuropathy     bilateral legs    • Nicotine dependence      other tobacco product, uncomplicated - vapor cig      • Other atherosclerosis of native arteries of extremities, bilateral legs (CMS/HCC)     with intermittent claudication   • Renal failure     stage 3   • Sleep apnea    • Tobacco dependence syndrome      Past Surgical History:   Procedure Laterality Date   • CARDIAC CATHETERIZATION     • CARDIAC CATHETERIZATION N/A 2/4/2020    Procedure: Left Heart Cath/PCI if indicated;  Surgeon: Cierra Franklin MD;  Location: [facility] INVASIVE LOC[facility];  Service: Cardiology;  Laterality: N/A;   • CORONARY ARTERY BYPASS GRAFT     • GALLBLADDER SURGERY       Social History     Socioeconomic History   • Marital status:      Spouse name: Not on file   • Number of children: Not on file   • Years of education: Not on file   • Highest education level: Not on file   Tobacco  "Use   • Smoking status: Current Every Day Smoker     Types: Electronic Cigarette   • Smokeless tobacco: Never Used   Substance and Sexual Activity   • Alcohol use: No   • Drug use: No   • Sexual activity: Defer     Family History   Problem Relation Age of Onset   • Heart disease Other           Objective     Blood pressure (!) 184/88, pulse 62, height 180.3 cm (71\"), weight 101 kg (222 lb), SpO2 98 %.  Physical Exam   Constitutional: He is oriented to person, place, and time. Vital signs are normal. He appears well-developed and well-nourished.   Obese   HENT:   Head: Normocephalic and atraumatic.   Masked   Eyes: Pupils are equal, round, and reactive to light. Conjunctivae, EOM and lids are normal.   Eyeglasses   Neck: Trachea normal and normal range of motion. No tracheal tenderness present. No thyroid mass present.   Cardiovascular: Normal rate, regular rhythm and normal heart sounds. PMI is not displaced. Exam reveals no gallop.   No murmur heard.  Pulmonary/Chest: Effort normal and breath sounds normal. No respiratory distress. He has no decreased breath sounds. He has no wheezes. He has no rhonchi. He exhibits no tenderness.   Abdominal: Soft. Normal appearance and bowel sounds are normal. There is no hepatomegaly. There is no tenderness.   Musculoskeletal:   Normal gait, mild BLE edema     Vascular Status -  His right foot exhibits abnormal foot edema. His left foot exhibits abnormal foot edema.  Lymphadenopathy:        Head (right side): No submandibular adenopathy present.        Head (left side): No submandibular adenopathy present.     He has no cervical adenopathy.        Right: No supraclavicular adenopathy present.        Left: No supraclavicular adenopathy present.   Neurological: He is alert and oriented to person, place, and time.   Skin: Skin is warm and dry. No rash noted. No cyanosis. Nails show no clubbing.   Psychiatric: He has a normal mood and affect. His speech is normal and behavior is " normal. Judgment normal.   Nursing note and vitals reviewed.    PFTs: 11/15/19 (independently reviewed and interpreted by me)  Ratio 61  FVC 2.37/ 50%  FEV1 1.43/ 41%  DLCO 17.62/ 52%  Severe obstruction with no significant bronchodilator response.  Moderately reduced diffusing capacity.  No comparative data available.       Radiology (independently reviewed and interpreted by me): LD CT 8/6/2019 showed 5 x 4 x 7 mm noncalcified nodule lateral right MIDDLE lobe, old granulomatous disease    CT chest without contrast 2/3/2020: Old granulomatous disease, stable right lower lobe superior segment 5 mm noncalcified nodule and stable 4.1 mm right middle lobe nodule, NACPD    LHC 2/4/20:  1.  Two-vessel obstructive coronary artery disease involving the LAD and chronic total occlusion of the RCA.  2.  2 out of 2 grafts are patent. LIMA to LAD is small in caliber but patent.  SVG to RCA is widely patent.  3.  Moderately elevated left-sided filling pressures     Assessment/Plan     Conrado was seen today for copd.    Diagnoses and all orders for this visit:    Stage 3 severe COPD by GOLD classification (CMS/[facility])    Nocturnal hypoxemia    HALIMA (obstructive sleep apnea)    Class 1 obesity due to excess calories with serious comorbidity and body mass index (BMI) of 32.0 to 32.9 in adult    Personal history of tobacco use, presenting hazards to health    Electronic cigarette use    Essential hypertension         Discussion/ Recommendations:   He continues to remain stable on Anoro with rare albuterol use.  I have encouraged him to continue efforts at stopping electronic cigarette use as well as maintaining regular exercise.  Otherwise, his blood pressure is elevated today and I have encouraged him to follow this and discuss with his PCP if it remains elevated.    -Continue Anoro daily.  -Continue albuterol as needed for dyspnea or wheeze.  -Resume lung cancer screening CT February 2021 (2ppd x 50yrs, still smoking).  -Continue 2 L  nocturnal oxygen via [employer].  -Continue antihypertensives.  Monitor blood pressure and follow-up with PCP if it remains elevated.  -Conrado Cazares  reports that he has been smoking electronic cigarette. He has never used smokeless tobacco.. I have educated him on the risk of diseases from using tobacco products such as cancer, COPD and heart diease. I advised him to quit and he is willing to quit. We have discussed the following method/s for tobacco cessation:  Counseling.  He is not ready to set a quit date yet. He will follow up with me in 3 months or sooner to check on his progress. I spent 2 minutes counseling the patient.  -Up-to-date with Pneumovax 23 and flu vaccines.     Patient's Body mass index is 30.96 kg/m². BMI is above normal parameters. Recommendations include: exercise counseling.           Return in about 3 months (around 9/23/2020) for Recheck COPD.      Thank you for allowing me to participate in the care of Conrado Cazares. Please do not hesitate to contact me with any questions.         This document has been electronically signed by Yolie Cazares MD on June 23, 2020 11:07      Dictated using Dragon     decreased velocity of limb motion/decreased weight-shifting ability/decreased jay/increased time in double stance/decreased step length/decreased stride length

## 2020-06-23 ENCOUNTER — OFFICE VISIT (OUTPATIENT)
Dept: PULMONOLOGY | Facility: CLINIC | Age: 70
End: 2020-06-23

## 2020-06-23 VITALS
DIASTOLIC BLOOD PRESSURE: 88 MMHG | SYSTOLIC BLOOD PRESSURE: 184 MMHG | HEIGHT: 71 IN | OXYGEN SATURATION: 98 % | WEIGHT: 222 LBS | BODY MASS INDEX: 31.08 KG/M2 | HEART RATE: 62 BPM

## 2020-06-23 DIAGNOSIS — I10 ESSENTIAL HYPERTENSION: ICD-10-CM

## 2020-06-23 DIAGNOSIS — J44.9 STAGE 3 SEVERE COPD BY GOLD CLASSIFICATION (HCC): Primary | ICD-10-CM

## 2020-06-23 DIAGNOSIS — E66.09 CLASS 1 OBESITY DUE TO EXCESS CALORIES WITH SERIOUS COMORBIDITY AND BODY MASS INDEX (BMI) OF 32.0 TO 32.9 IN ADULT: ICD-10-CM

## 2020-06-23 DIAGNOSIS — G47.33 OSA (OBSTRUCTIVE SLEEP APNEA): ICD-10-CM

## 2020-06-23 DIAGNOSIS — Z87.891 PERSONAL HISTORY OF TOBACCO USE, PRESENTING HAZARDS TO HEALTH: ICD-10-CM

## 2020-06-23 DIAGNOSIS — Z78.9 ELECTRONIC CIGARETTE USE: ICD-10-CM

## 2020-06-23 DIAGNOSIS — G47.34 NOCTURNAL HYPOXEMIA: ICD-10-CM

## 2020-06-23 PROCEDURE — 99214 OFFICE O/P EST MOD 30 MIN: CPT | Performed by: INTERNAL MEDICINE

## 2020-07-24 DIAGNOSIS — R09.89 LEFT CAROTID BRUIT: ICD-10-CM

## 2020-07-24 DIAGNOSIS — I73.9 PVD (PERIPHERAL VASCULAR DISEASE) (HCC): Primary | ICD-10-CM

## 2020-07-24 RX ORDER — ATORVASTATIN CALCIUM 20 MG/1
TABLET, FILM COATED ORAL
Qty: 90 TABLET | Refills: 0 | Status: SHIPPED | OUTPATIENT
Start: 2020-07-24 | End: 2021-02-22

## 2020-08-04 ENCOUNTER — OFFICE VISIT (OUTPATIENT)
Dept: CARDIAC SURGERY | Facility: CLINIC | Age: 70
End: 2020-08-04

## 2020-08-04 VITALS
OXYGEN SATURATION: 98 % | BODY MASS INDEX: 31.36 KG/M2 | SYSTOLIC BLOOD PRESSURE: 159 MMHG | DIASTOLIC BLOOD PRESSURE: 78 MMHG | HEART RATE: 57 BPM | HEIGHT: 71 IN | WEIGHT: 224 LBS

## 2020-08-04 DIAGNOSIS — I73.9 PVD (PERIPHERAL VASCULAR DISEASE) (HCC): ICD-10-CM

## 2020-08-04 DIAGNOSIS — I65.23 CAROTID STENOSIS, ASYMPTOMATIC, BILATERAL: ICD-10-CM

## 2020-08-04 DIAGNOSIS — E78.2 MIXED HYPERLIPIDEMIA: Primary | ICD-10-CM

## 2020-08-04 PROCEDURE — 99214 OFFICE O/P EST MOD 30 MIN: CPT | Performed by: NURSE PRACTITIONER

## 2020-08-04 RX ORDER — HYDRALAZINE HYDROCHLORIDE 25 MG/1
50 TABLET, FILM COATED ORAL 2 TIMES DAILY
COMMUNITY
End: 2021-09-30

## 2020-08-04 NOTE — PROGRESS NOTES
CVTS Office Progress Note     8/4/2020    Conrado Cazares  1950    Chief Complaint:    Chief Complaint   Patient presents with   • Follow-up   • Peripheral Vascular Disease   • Carotid Artery Disease       HPI:      PCP:  Tahir Becerra  Cardiology:  Dr. Browning  Nephrology:  Dr. Deras  Pulmonary: Dr. Cazares    70 y.o. male with HTN(stable, increased risk stroke, rupture), Hyperlipidemia(stable, increased risk cardiovascular events), Obesity(uncontrolled, increased risk cardiovascular events), Smoker(uncontrolled, increased risk cardiovascular events), COPD(stable, increased risk pulmonary complications) and Chronic Kidney Disease(stable, increased risk renal failure) CAD (prior CABG), PVD (chronic, asymptomatic), Carotid stenosis (new, increased risk of CVA).  former smoker and now uses e-cigarette.  Established with [facility] 2014 for claudication, repeat studies post medical management improved, no prior intervention, no current claudication.  Recent LHC grafts patent.  Serial CT scans for lung cancer screening followed by Dr. Cazares.  Reports today for ASHWIN and carotid ultrasound.  No other associated signs, symptoms or modifying factors.    2007 CABG [facility], Dr. Sheikh     06/2014 ASHWIN: 0.66 tri/bi of the RIGHT, and 0.56 tri/bi of the LEFT  12/21/2015 ASHWIN:  RIGHT .80 triphasic.  LEFT .88 triphasic.  6/27/2016 ASHWIN:  RIGHT .88 triphasic.  LEFT .79 triphasic.  01/03/17   ASHWIN:  RIGHT .82  triphasic.  LEFT .70  Triphasic.  07/06/17  ASHWIN:  RIGHT .86  triphasic.  LEFT .70  Triphasic.  01/18/18   ASHWIN:  RIGHT 1.01  Triphasic.  LEFT .88  Triphasic.  07/19/18   ASHWIN:  RIGHT 0.96  Triphasic.  LEFT .79  Triphasic.  01/24/19 ASHWIN: RIGHT 1.08 Triphasic. LEFT 0.78 Biphasic from femoral to distal  8/1/2019 ASHWIN: Right 0.91 triphasic.  Left 0.80 triphasic.  8/2020 ASHWIN: Right 0.79 triphasic.  Left 0.70 triphasic pop, biphasic distally.     8/2020 Carotid Duplex: DIAZ 50-69% mPSV 176c/s Ratio 2.2, LICA 0-49% mPSV 87c/s Ratio  1.5, Antegrade vertebrals      The following portions of the patient's history were reviewed and updated as appropriate: allergies, current medications, past family history, past medical history, past social history, past surgical history and problem list.  Recent images independently reviewed.  Available laboratory values reviewed.    PMH:  Past Medical History:   Diagnosis Date   • Anxiety    • Class 1 obesity due to excess calories with serious comorbidity and body mass index (BMI) of 31.0 to 31.9 in adult 9/6/2019   • COPD (chronic obstructive pulmonary disease) (CMS/[facility])    • Dehydration    • Depression    • Dyslipidemia    • Emphysema of lung (CMS/[facility])    • Essential (primary) hypertension    • Essential hypertension    • GERD (gastroesophageal reflux disease)    • Heat exhaustion    • Hyperlipidemia    • Neuropathy     bilateral legs    • Nicotine dependence      other tobacco product, uncomplicated - vapor cig      • Other atherosclerosis of native arteries of extremities, bilateral legs (CMS/[facility])     with intermittent claudication   • Renal failure     stage 3   • Sleep apnea    • Tobacco dependence syndrome      Past Surgical History:   Procedure Laterality Date   • CARDIAC CATHETERIZATION     • CARDIAC CATHETERIZATION N/A 2/4/2020    Procedure: Left Heart Cath/PCI if indicated;  Surgeon: Cierra Franklin MD;  Location: [facility] CATH INVASIVE LOCATION;  Service: Cardiology;  Laterality: N/A;   • CORONARY ARTERY BYPASS GRAFT     • GALLBLADDER SURGERY       Family History   Problem Relation Age of Onset   • Heart disease Other      Social History     Tobacco Use   • Smoking status: Current Every Day Smoker     Types: Electronic Cigarette   • Smokeless tobacco: Never Used   Substance Use Topics   • Alcohol use: No   • Drug use: No       ALLERGIES:  Allergies   Allergen Reactions   • Dopamine Dizziness     Dropped BP          MEDICATIONS:    Current Outpatient Medications:   •  albuterol sulfate  (90 Base)  MCG/ACT inhaler, Inhale 2 puffs Every 4 (Four) Hours As Needed for Wheezing or Shortness of Air., Disp: 18 g, Rfl: 11  •  amLODIPine (NORVASC) 5 MG tablet, Take 5 mg by mouth Daily., Disp: , Rfl:   •  aspirin 81 MG tablet, Take 1 tablet by mouth Daily., Disp: 30 tablet, Rfl: 11  •  atorvastatin (LIPITOR) 20 MG tablet, TAKE 1 TABLET BY MOUTH ONCE DAILY AT NIGHT, Disp: 90 tablet, Rfl: 0  •  bumetanide (BUMEX) 2 MG tablet, Take 2 mg by mouth Daily., Disp: , Rfl:   •  Cholecalciferol (VITAMIN D3) 1000 units capsule, Take 1,000 Units by mouth Daily., Disp: , Rfl:   •  cilostazol (PLETAL) 100 MG tablet, TAKE ONE TABLET BY MOUTH TWICE DAILY (Patient taking differently: Take 100 mg by mouth 2 (Two) Times a Day.), Disp: 60 tablet, Rfl: 5  •  CloNIDine (CATAPRES) 0.1 MG tablet, Take 0.1 mg by mouth every night at bedtime. Take 1 tablet by mouth nightly for bp over 150, Disp: , Rfl:   •  escitalopram (LEXAPRO) 20 MG tablet, Take 10 mg by mouth Daily. 3 tablets by mouth daily, Disp: , Rfl:   •  gabapentin (NEURONTIN) 100 MG capsule, Take 100 mg by mouth 2 (Two) Times a Day. 1 am 2 pm, Disp: , Rfl:   •  hydrALAZINE (APRESOLINE) 25 MG tablet, Take 25 mg by mouth 2 (two) times a day., Disp: , Rfl:   •  lisinopril (PRINIVIL,ZESTRIL) 20 MG tablet, Take 20 mg by mouth Daily., Disp: , Rfl:   •  nitroglycerin (NITROSTAT) 0.4 MG SL tablet, Place 0.4 mg under the tongue Every 5 (Five) Minutes As Needed for Chest Pain. Take no more than 3 doses in 15 minutes., Disp: , Rfl:   •  O2 (OXYGEN), Inhale 1 (One) Time. Oxygen at night, Disp: , Rfl:   •  omega-3 acid ethyl esters (LOVAZA) 1 G capsule, Take 2 g by mouth 2 (Two) Times a Day., Disp: , Rfl:   •  pantoprazole (PROTONIX) 40 MG EC tablet, Take 40 mg by mouth Daily., Disp: , Rfl:   •  primidone (MYSOLINE) 250 MG tablet, Take  by mouth 2 (Two) Times a Day., Disp: , Rfl:   •  propranolol LA (INDERAL LA) 160 MG 24 hr capsule, Take 160 mg by mouth Daily., Disp: , Rfl:   •  rOPINIRole (REQUIP)  "1 MG tablet, Take 2 mg by mouth Every Night., Disp: , Rfl:   •  umeclidinium-vilanterol (ANORO ELLIPTA) 62.5-25 MCG/INH aerosol powder  inhaler, Inhale 1 puff Daily., Disp: 1 each, Rfl: 11      Review of Systems   Constitution: Negative for chills, decreased appetite, fever and weight loss.   HENT: Negative for congestion, nosebleeds and sore throat.    Eyes: Negative for blurred vision, visual disturbance and visual halos.   Cardiovascular: Positive for dyspnea on exertion. Negative for chest pain, claudication and leg swelling.   Respiratory: Negative for cough, shortness of breath, sputum production and wheezing.    Endocrine: Negative for cold intolerance and polyuria.   Hematologic/Lymphatic: Negative for bleeding problem. Bruises/bleeds easily.        Does not report S/S of adverse bleeding event including nose bleeds, hematuria, melena, gingival bleeding, or hematemesis.   Skin: Positive for unusual hair distribution. Negative for flushing and nail changes.   Musculoskeletal: Positive for arthritis, back pain and joint pain.   Gastrointestinal: Negative for bloating, abdominal pain, hematemesis, melena, nausea and vomiting.   Genitourinary: Negative for flank pain and hematuria.   Neurological: Negative for brief paralysis, difficulty with concentration, focal weakness, light-headedness, loss of balance, numbness, paresthesias and weakness.        No amaurosis fugax, TIA, or CVA.     Psychiatric/Behavioral: Negative for altered mental status, depression, substance abuse and suicidal ideas.   Allergic/Immunologic: Negative for hives and persistent infections.         Vitals:    08/04/20 1440   BP: 159/78   BP Location: Left arm   Patient Position: Sitting   Cuff Size: Adult   Pulse: 57   SpO2: 98%   Weight: 102 kg (224 lb)   Height: 180.3 cm (71\")     Physical Exam   Constitutional: He is oriented to person, place, and time. He appears well-developed and well-nourished.   HENT:   Head: Normocephalic and " atraumatic.   Mouth/Throat: Oropharynx is clear and moist.   Eyes: Pupils are equal, round, and reactive to light. Conjunctivae and EOM are normal.   Neck: Normal range of motion. Neck supple.   Cardiovascular: Normal rate and intact distal pulses.   No murmur heard.  Pulmonary/Chest: Effort normal and breath sounds normal. No respiratory distress.   Abdominal: Soft. Bowel sounds are normal. He exhibits no distension.   Musculoskeletal: Normal range of motion. He exhibits edema (+2 BLE). He exhibits no tenderness.   Neurological: He is alert and oriented to person, place, and time. No cranial nerve deficit.   Skin: Skin is warm and dry. Capillary refill takes 2 to 3 seconds.   There is no evidence of skin breakdown of the bilateral lower extremities.  BLE pink, no evidence of ischemia.  Feet are absent of dependent rubor.   Psychiatric: He has a normal mood and affect. Judgment normal.   Nursing note and vitals reviewed.      Assessment & Plan     Independent Review of Studies  8/2020 ASHWIN: Right 0.79 triphasic.  Left 0.70 triphasic pop, biphasic distally.   8/2020 Carotid Duplex: DIAZ 50-69% mPSV 176c/s Ratio 2.2, LICA 0-49% mPSV 87c/s Ratio 1.5, Antegrade vertebrals    1. Mixed hyperlipidemia  Lipid-lowering therapy has been proven beneficial in patients with cardio-vascular disease. Current guidelines recommend statin treatment for all patients with PAD,CAD and carotid stenosis. Statins are beneficial in preventing cardiovascular events, increasing functional capacity and lower the risk of adverse limb loss in PAD. Statins decrease the progression of plaque formation and may improve peripheral vessel lining, and aid in reversing atherosclerosis.    2. PVD (peripheral vascular disease) (CMS/HCC)  Moderate reduction in ratio bilaterally.  Asymptomatic.  LLE waveforms consistent with distal disease.   Check feet daily.      Repeat ASHWIN in 6 months    ASA, Statin, Pletal, BB, ACE    - Doppler Ankle Brachial Index  Single Level CAR; Future    3. Carotid stenosis, asymptomatic, bilateral  Moderate R ICA disease, asymptomatic.    Repeat duplex in 6 months  Medical management as above    - Duplex Carotid Ultrasound CAR; Future    Detailed discussion regarding risks, benefits, and treatment plan. Images independently reviewed. Patient understands, agrees, and wishes to proceed with plan.       This document has been electronically signed by MAL BelcherCNP-BC [place]ar  On August 4, 2020 16:07      EMR Dragon/Transcription disclaimer:   Much of this encounter note is an electronic transcription/translation of spoken language to printed text. The electronic translation of spoken language may permit erroneous, or at times, nonsensical words or phrases to be inadvertently transcribed; Although I have reviewed the note for such errors, some may still exist.

## 2020-08-04 NOTE — PATIENT INSTRUCTIONS
The results of your vascular studies of your right leg shows moderate disease with good  circulation, in the left leg shows moderatedisease with good  circulation.      If signs and symptoms of ischemia should occur including but not limited to pale/blue discoloration of limb, increasing pain with ambulation or at rest, or a non-healing wound. Patient is to notify [facility] V[facility] center for immediate evaluation.    The results of your carotid ultrasound shows moderate disease of the right carotid and is worsening from previous exam, ultrasound of the left carotid shows mild disease and is stable from previous exam.    Follow up in 6 months    If you should experience any neurological symptoms including but not limited to visual or speech disturbances confusion, seizures, or weakness of limbs of one side of your body notify [facility] V[facility] center immediately for evaluation or if after hours present to the nearest Emergency Department.

## 2020-09-22 NOTE — PROGRESS NOTES
Pulmonary Office Follow-up    Subjective     Conrado Cazares is seen today at the office for   Chief Complaint   Patient presents with   • COPD   .    Subjective     History of Present Illness  Conrado Cazares is a 70 y.o. male with a PMH significant for COPD, past tobacco use, obesity, pulmonary hypertension, HALIMA intolerant of CPAP, ASCAD, hypertension, hyperlipidemia, and PVD who presents for follow-up of COPD.     10/15/19: Unfortunately, we were not able to perform PFTs so I recommended using albuterol only as needed and repeating a CT chest without contrast in February 2020 to follow the right lung nodule.  I also recommended he undergo overnight pulse oximetry to determine need for nocturnal oxygen and counseled him on the importance of complete tobacco cessation.    11/15/19: He had daily albuterol use I recommended starting Anoro daily as well as initiating nocturnal oxygen.  I did spend time counseling the patient on the importance of not using the electronic cigarette due to risk of progressive lung disease.    12/13/19: He was doing well on Anoro so encouraged him to continue with it.  I also recommended a repeat CT chest in early February to follow right middle lobe nodule.  I did  him again on the importance of complete e-cigarette cessation but he is not willing to quit.    2/6/20: I reviewed his CT which showed stable lung nodules with no new findings I recommended defaulting into lung cancer screening in February 2021.  Otherwise, he was stable on Anoro as well as supplemental oxygen.  I did encourage him to stop using electronic cigarette he set a quit date for 3/29/2020.    6/23/20: He remained stable on Anoro and was using his supplemental oxygen as recommended.  I did encourage him to stop smoking but he was again not willing to quit.    9/23/20: He states he has been doing well on Anoro and rarely needs his albuterol.  Patient spends a lot of time with yard work and mowing  and while he does have to stop to take breaks, he does not need to use his inhaler.  He denies cough, sputum, wheeze, or chest pain.  He does report that his amlodipine was stopped and hydralazine was increased.  He has noticed a slight improvement in his leg swelling but his left leg continues to swell more than the right.  Otherwise, he is compliant with his supplemental oxygen at night and continues to use the e-cigarette.  Patient did get Prevnar 13 and flu vaccine last week.      Tobacco use history:  Type: cigarettes and electronic cigarettes  Amount: 2 ppd  Duration: 50 years  Cessation: 3/ 29/2014; still using e-cigarette  Willing to quit: Yes      Review of Systems: History obtained from chart review and the patient.  Review of Systems   Constitutional: Negative for fever and unexpected weight change.   HENT: Positive for hearing loss. Negative for congestion and postnasal drip.    Respiratory: Positive for shortness of breath. Negative for cough and wheezing.    Cardiovascular: Positive for leg swelling. Negative for chest pain.   Gastrointestinal: Negative for abdominal pain.   Musculoskeletal: Negative for arthralgias and back pain.     As described in the HPI. Otherwise, remainder of ROS (14 systems) were negative.    Patient Active Problem List   Diagnosis   • PVD (peripheral vascular disease) (CMS/HCC)   • Essential hypertension   • Mixed hyperlipidemia   • Reactive depression   • Neuropathy   • Left carotid bruit   • Electronic cigarette use   • Encounter for screening for lung cancer   • Stopped smoking with greater than 40 pack year history   • Class 1 obesity due to excess calories with serious comorbidity and body mass index (BMI) of 32.0 to 32.9 in adult   • Pulmonary hypertension (CMS/HCC)   • Nonrheumatic mitral valve regurgitation   • Nonrheumatic tricuspid valve regurgitation   • Nonrheumatic pulmonary valve insufficiency   • Coronary artery disease involving native coronary artery of native  heart without angina pectoris   • Pericardial effusion   • Lung nodule   • HALIMA (obstructive sleep apnea)   • Stage 3 severe COPD by GOLD classification (CMS/[facility])   • Nocturnal hypoxemia   • Personal history of tobacco use, presenting hazards to health   • Dyspnea on exertion   • Abnormal stress echo   • Carotid stenosis, asymptomatic, bilateral         Current Outpatient Medications:   •  albuterol sulfate  (90 Base) MCG/ACT inhaler, Inhale 2 puffs Every 4 (Four) Hours As Needed for Wheezing or Shortness of Air., Disp: 18 g, Rfl: 11  •  amLODIPine (NORVASC) 5 MG tablet, Take 5 mg by mouth Daily., Disp: , Rfl:   •  aspirin 81 MG tablet, Take 1 tablet by mouth Daily., Disp: 30 tablet, Rfl: 11  •  atorvastatin (LIPITOR) 20 MG tablet, TAKE 1 TABLET BY MOUTH ONCE DAILY AT NIGHT, Disp: 90 tablet, Rfl: 0  •  bumetanide (BUMEX) 2 MG tablet, Take 2 mg by mouth Daily., Disp: , Rfl:   •  Cholecalciferol (VITAMIN D3) 1000 units capsule, Take 1,000 Units by mouth Daily., Disp: , Rfl:   •  cilostazol (PLETAL) 100 MG tablet, TAKE ONE TABLET BY MOUTH TWICE DAILY (Patient taking differently: Take 100 mg by mouth 2 (Two) Times a Day.), Disp: 60 tablet, Rfl: 5  •  escitalopram (LEXAPRO) 20 MG tablet, Take 10 mg by mouth Daily. 3 tablets by mouth daily, Disp: , Rfl:   •  gabapentin (NEURONTIN) 100 MG capsule, Take 100 mg by mouth 2 (Two) Times a Day. 1 am 2 pm, Disp: , Rfl:   •  hydrALAZINE (APRESOLINE) 25 MG tablet, Take 50 mg by mouth 2 (two) times a day., Disp: , Rfl:   •  lisinopril (PRINIVIL,ZESTRIL) 20 MG tablet, Take 20 mg by mouth Daily., Disp: , Rfl:   •  nitroglycerin (NITROSTAT) 0.4 MG SL tablet, Place 0.4 mg under the tongue Every 5 (Five) Minutes As Needed for Chest Pain. Take no more than 3 doses in 15 minutes., Disp: , Rfl:   •  O2 (OXYGEN), Inhale 1 (One) Time. Oxygen at night, Disp: , Rfl:   •  omega-3 acid ethyl esters (LOVAZA) 1 G capsule, Take 2 g by mouth 2 (Two) Times a Day., Disp: , Rfl:   •  pantoprazole  (PROTONIX) 40 MG EC tablet, Take 40 mg by mouth Daily., Disp: , Rfl:   •  primidone (MYSOLINE) 250 MG tablet, Take  by mouth 2 (Two) Times a Day., Disp: , Rfl:   •  propranolol LA (INDERAL LA) 160 MG 24 hr capsule, Take 160 mg by mouth Daily., Disp: , Rfl:   •  rOPINIRole (REQUIP) 1 MG tablet, Take 2 mg by mouth Every Night., Disp: , Rfl:   •  umeclidinium-vilanterol (ANORO ELLIPTA) 62.5-25 MCG/INH aerosol powder  inhaler, Inhale 1 puff Daily., Disp: 1 each, Rfl: 11    Allergies   Allergen Reactions   • Dopamine Dizziness     Dropped BP        Past Medical History:   Diagnosis Date   • Anxiety    • Class 1 obesity due to excess calories with serious comorbidity and body mass index (BMI) of 31.0 to 31.9 in adult 9/6/2019   • COPD (chronic obstructive pulmonary disease) (CMS/[facility])    • Dehydration    • Depression    • Dyslipidemia    • Emphysema of lung (CMS/[facility])    • Essential (primary) hypertension    • Essential hypertension    • GERD (gastroesophageal reflux disease)    • Heat exhaustion    • Hyperlipidemia    • Neuropathy     bilateral legs    • Nicotine dependence      other tobacco product, uncomplicated - vapor cig      • Other atherosclerosis of native arteries of extremities, bilateral legs (CMS/[facility])     with intermittent claudication   • Renal failure     stage 3   • Sleep apnea    • Tobacco dependence syndrome      Past Surgical History:   Procedure Laterality Date   • CARDIAC CATHETERIZATION     • CARDIAC CATHETERIZATION N/A 2/4/2020    Procedure: Left Heart Cath/PCI if indicated;  Surgeon: Cierra Franklin MD;  Location: [facility] CATH INVASIVE LOC[facility];  Service: Cardiology;  Laterality: N/A;   • CORONARY ARTERY BYPASS GRAFT     • GALLBLADDER SURGERY       Social History     Socioeconomic History   • Marital status:      Spouse name: Not on file   • Number of children: Not on file   • Years of education: Not on file   • Highest education level: Not on file   Tobacco Use   • Smoking status: Current  "Every Day Smoker     Types: Electronic Cigarette   • Smokeless tobacco: Never Used   Substance and Sexual Activity   • Alcohol use: No   • Drug use: No   • Sexual activity: Defer     Family History   Problem Relation Age of Onset   • Heart disease Other           Objective     Blood pressure 140/86, pulse 59, height 180.3 cm (71\"), weight 103 kg (228 lb), SpO2 98 %.  Physical Exam   Constitutional: He is oriented to person, place, and time. He appears well-developed.   Obese   HENT:   Head: Normocephalic and atraumatic.   Eyes: Pupils are equal, round, and reactive to light. Conjunctivae and lids are normal.   Eyeglasses   Neck: Trachea normal and normal range of motion. No tracheal tenderness present. No thyroid mass present.   Cardiovascular: Normal rate, regular rhythm and normal heart sounds. PMI is not displaced. Exam reveals no gallop.   No murmur heard.  Pulmonary/Chest: Effort normal and breath sounds normal. No respiratory distress. He has no decreased breath sounds. He has no wheezes. He has no rhonchi. He exhibits no tenderness.   Abdominal: Soft. Normal appearance and bowel sounds are normal. There is no hepatomegaly. There is no abdominal tenderness.   Musculoskeletal:      Comments: Normal gait, mild BLE edema     Vascular Status -  His right foot exhibits abnormal foot edema. His left foot exhibits abnormal foot edema.  Lymphadenopathy:        Head (right side): No submandibular adenopathy present.        Head (left side): No submandibular adenopathy present.     He has no cervical adenopathy.        Right: No supraclavicular adenopathy present.        Left: No supraclavicular adenopathy present.   Neurological: He is alert and oriented to person, place, and time.   Skin: Skin is warm and dry. No rash noted. Nails show no clubbing.   Psychiatric: His speech is normal and behavior is normal. Judgment normal.   Nursing note and vitals reviewed.    PFTs: 11/15/19 (independently reviewed and interpreted " by me)  Ratio 61  FVC 2.37/ 50%  FEV1 1.43/ 41%  DLCO 17.62/ 52%  Severe obstruction with no significant bronchodilator response.  Moderately reduced diffusing capacity.  No comparative data available.       Radiology (independently reviewed and interpreted by me): LD CT 8/6/2019 showed 5 x 4 x 7 mm noncalcified nodule lateral right MIDDLE lobe, old granulomatous disease    CT chest without contrast 2/3/2020: Old granulomatous disease, stable right lower lobe superior segment 5 mm noncalcified nodule and stable 4.1 mm right middle lobe nodule, NACPD    LHC 2/4/20:  1.  Two-vessel obstructive coronary artery disease involving the LAD and chronic total occlusion of the RCA.  2.  2 out of 2 grafts are patent. LIMA to LAD is small in caliber but patent.  SVG to RCA is widely patent.  3.  Moderately elevated left-sided filling pressures     Assessment/Plan     Conrado was seen today for copd.    Diagnoses and all orders for this visit:    Stage 3 severe COPD by GOLD classification (CMS/[facility])  -     albuterol sulfate  (90 Base) MCG/ACT inhaler; Inhale 2 puffs Every 4 (Four) Hours As Needed for Wheezing or Shortness of Air.  -     umeclidinium-vilanterol (ANORO ELLIPTA) 62.5-25 MCG/INH aerosol powder  inhaler; Inhale 1 puff Daily.    Nocturnal hypoxemia    HALIMA (obstructive sleep apnea)    Lung nodule    Class 1 obesity due to excess calories with serious comorbidity and body mass index (BMI) of 32.0 to 32.9 in adult    Personal history of tobacco use, presenting hazards to health    Chronic obstructive pulmonary disease, unspecified COPD type (CMS/[facility])         Discussion/ Recommendations:   He remained stable on Anoro with infrequent albuterol use.  I have encouraged him to maintain regular exercise as well as stressed the importance of stopping e-cigarette use.    -Continue Anoro daily.  -Use albuterol as needed for dyspnea or wheeze.  -Resume lung cancer screening CT February 2021 (2ppd x 50yrs, still  smoking).  -Continue 2 L nocturnal oxygen via [employer].  -Continue antihypertensives.  Monitor blood pressure and follow-up with PCP if it remains elevated.  -Conrado Cazares  reports that he has been smoking electronic cigarette. He has never used smokeless tobacco.. I have educated him on the risk of diseases from using tobacco products such as cancer, COPD and heart diease. I advised him to quit and he is willing to quit. We have discussed the following method/s for tobacco cessation:  Counseling.  He is not ready to set a quit date yet. He will follow up with me in 3 months or sooner to check on his progress. I spent 1 minutes counseling the patient.  -Up-to-date with pneumococcal and influenza vaccinations.    Patient's Body mass index is 31.8 kg/m². BMI is above normal parameters. Recommendations include: exercise counseling.           Return in about 6 months (around 3/23/2021).      Thank you for allowing me to participate in the care of Conrado Cazares. Please do not hesitate to contact me with any questions.         This document has been electronically signed by Yolie Cazares MD on September 23, 2020 11:07 CDT      Dictated using Dragon

## 2020-09-23 ENCOUNTER — OFFICE VISIT (OUTPATIENT)
Dept: PULMONOLOGY | Facility: CLINIC | Age: 70
End: 2020-09-23

## 2020-09-23 VITALS
HEIGHT: 71 IN | BODY MASS INDEX: 31.92 KG/M2 | HEART RATE: 59 BPM | WEIGHT: 228 LBS | OXYGEN SATURATION: 98 % | SYSTOLIC BLOOD PRESSURE: 140 MMHG | DIASTOLIC BLOOD PRESSURE: 86 MMHG

## 2020-09-23 DIAGNOSIS — J44.9 STAGE 3 SEVERE COPD BY GOLD CLASSIFICATION (HCC): Primary | ICD-10-CM

## 2020-09-23 DIAGNOSIS — G47.34 NOCTURNAL HYPOXEMIA: ICD-10-CM

## 2020-09-23 DIAGNOSIS — R91.1 LUNG NODULE: ICD-10-CM

## 2020-09-23 DIAGNOSIS — G47.33 OSA (OBSTRUCTIVE SLEEP APNEA): ICD-10-CM

## 2020-09-23 DIAGNOSIS — J44.9 CHRONIC OBSTRUCTIVE PULMONARY DISEASE, UNSPECIFIED COPD TYPE (HCC): ICD-10-CM

## 2020-09-23 DIAGNOSIS — Z87.891 PERSONAL HISTORY OF TOBACCO USE, PRESENTING HAZARDS TO HEALTH: ICD-10-CM

## 2020-09-23 DIAGNOSIS — E66.09 CLASS 1 OBESITY DUE TO EXCESS CALORIES WITH SERIOUS COMORBIDITY AND BODY MASS INDEX (BMI) OF 32.0 TO 32.9 IN ADULT: ICD-10-CM

## 2020-09-23 PROCEDURE — 99214 OFFICE O/P EST MOD 30 MIN: CPT | Performed by: INTERNAL MEDICINE

## 2020-09-23 RX ORDER — ALBUTEROL SULFATE 90 UG/1
2 AEROSOL, METERED RESPIRATORY (INHALATION) EVERY 4 HOURS PRN
Qty: 18 G | Refills: 11 | Status: SHIPPED | OUTPATIENT
Start: 2020-09-23 | End: 2022-10-13

## 2020-09-24 LAB
BH CV ECHO MEAS - ACS: 1.8 CM
BH CV ECHO MEAS - AO MAX PG (FULL): 1 MMHG
BH CV ECHO MEAS - AO MAX PG: 4.8 MMHG
BH CV ECHO MEAS - AO MEAN PG (FULL): 0 MMHG
BH CV ECHO MEAS - AO MEAN PG: 2 MMHG
BH CV ECHO MEAS - AO ROOT AREA (BSA CORRECTED): 1.6
BH CV ECHO MEAS - AO ROOT AREA: 10.2 CM^2
BH CV ECHO MEAS - AO ROOT DIAM: 3.6 CM
BH CV ECHO MEAS - AO V2 MAX: 109 CM/SEC
BH CV ECHO MEAS - AO V2 MEAN: 70.1 CM/SEC
BH CV ECHO MEAS - AO V2 VTI: 23.7 CM
BH CV ECHO MEAS - ASC AORTA: 3.6 CM
BH CV ECHO MEAS - AVA(I,A): 3.1 CM^2
BH CV ECHO MEAS - AVA(I,D): 3.1 CM^2
BH CV ECHO MEAS - AVA(V,A): 2.8 CM^2
BH CV ECHO MEAS - AVA(V,D): 2.8 CM^2
BH CV ECHO MEAS - BSA(HAYCOCK): 2.3 M^2
BH CV ECHO MEAS - BSA: 2.2 M^2
BH CV ECHO MEAS - BZI_BMI: 31.8 KILOGRAMS/M^2
BH CV ECHO MEAS - BZI_METRIC_HEIGHT: 180.3 CM
BH CV ECHO MEAS - BZI_METRIC_WEIGHT: 103.4 KG
BH CV ECHO MEAS - EDV(CUBED): 117.6 ML
BH CV ECHO MEAS - EDV(MOD-SP2): 82 ML
BH CV ECHO MEAS - EDV(MOD-SP4): 79 ML
BH CV ECHO MEAS - EDV(TEICH): 112.8 ML
BH CV ECHO MEAS - EF(CUBED): 49.6 %
BH CV ECHO MEAS - EF(MOD-SP2): 51.2 %
BH CV ECHO MEAS - EF(MOD-SP4): 45.6 %
BH CV ECHO MEAS - EF(TEICH): 41.6 %
BH CV ECHO MEAS - EPSS: 0.8 CM
BH CV ECHO MEAS - ESV(CUBED): 59.3 ML
BH CV ECHO MEAS - ESV(MOD-SP2): 40 ML
BH CV ECHO MEAS - ESV(MOD-SP4): 43 ML
BH CV ECHO MEAS - ESV(TEICH): 65.9 ML
BH CV ECHO MEAS - FS: 20.4 %
BH CV ECHO MEAS - IVS/LVPW: 1.1
BH CV ECHO MEAS - IVSD: 1.3 CM
BH CV ECHO MEAS - LA DIMENSION: 3.9 CM
BH CV ECHO MEAS - LA/AO: 1.1
BH CV ECHO MEAS - LV DIASTOLIC VOL/BSA (35-75): 35.4 ML/M^2
BH CV ECHO MEAS - LV IVRT: 0.07 SEC
BH CV ECHO MEAS - LV MASS(C)D: 239.9 GRAMS
BH CV ECHO MEAS - LV MASS(C)DI: 107.6 GRAMS/M^2
BH CV ECHO MEAS - LV MAX PG: 3.7 MMHG
BH CV ECHO MEAS - LV MEAN PG: 2 MMHG
BH CV ECHO MEAS - LV SYSTOLIC VOL/BSA (12-30): 19.3 ML/M^2
BH CV ECHO MEAS - LV V1 MAX: 96.3 CM/SEC
BH CV ECHO MEAS - LV V1 MEAN: 62.3 CM/SEC
BH CV ECHO MEAS - LV V1 VTI: 23.3 CM
BH CV ECHO MEAS - LVIDD: 4.9 CM
BH CV ECHO MEAS - LVIDS: 3.9 CM
BH CV ECHO MEAS - LVLD AP2: 8.2 CM
BH CV ECHO MEAS - LVLD AP4: 8.1 CM
BH CV ECHO MEAS - LVLS AP2: 7.1 CM
BH CV ECHO MEAS - LVLS AP4: 6.9 CM
BH CV ECHO MEAS - LVOT AREA (M): 3.1 CM^2
BH CV ECHO MEAS - LVOT AREA: 3.1 CM^2
BH CV ECHO MEAS - LVOT DIAM: 2 CM
BH CV ECHO MEAS - LVPWD: 1.2 CM
BH CV ECHO MEAS - MR MAX PG: 75 MMHG
BH CV ECHO MEAS - MR MAX VEL: 433 CM/SEC
BH CV ECHO MEAS - MV A MAX VEL: 86.9 CM/SEC
BH CV ECHO MEAS - MV DEC SLOPE: 337 CM/SEC^2
BH CV ECHO MEAS - MV E MAX VEL: 95.8 CM/SEC
BH CV ECHO MEAS - MV E/A: 1.1
BH CV ECHO MEAS - MV MAX PG: 4.6 MMHG
BH CV ECHO MEAS - MV MEAN PG: 2 MMHG
BH CV ECHO MEAS - MV P1/2T MAX VEL: 101 CM/SEC
BH CV ECHO MEAS - MV P1/2T: 87.8 MSEC
BH CV ECHO MEAS - MV V2 MAX: 107 CM/SEC
BH CV ECHO MEAS - MV V2 MEAN: 60.4 CM/SEC
BH CV ECHO MEAS - MV V2 VTI: 29.7 CM
BH CV ECHO MEAS - MVA P1/2T LCG: 2.2 CM^2
BH CV ECHO MEAS - MVA(P1/2T): 2.5 CM^2
BH CV ECHO MEAS - MVA(VTI): 2.5 CM^2
BH CV ECHO MEAS - PA MAX PG (FULL): 2.5 MMHG
BH CV ECHO MEAS - PA MAX PG: 4.4 MMHG
BH CV ECHO MEAS - PA MEAN PG (FULL): 1 MMHG
BH CV ECHO MEAS - PA MEAN PG: 2 MMHG
BH CV ECHO MEAS - PA V2 MAX: 105 CM/SEC
BH CV ECHO MEAS - PA V2 MEAN: 72.4 CM/SEC
BH CV ECHO MEAS - PA V2 VTI: 24.7 CM
BH CV ECHO MEAS - PI END-D VEL: 156 CM/SEC
BH CV ECHO MEAS - RAP SYSTOLE: 3 MMHG
BH CV ECHO MEAS - RV MAX PG: 2 MMHG
BH CV ECHO MEAS - RV MEAN PG: 1 MMHG
BH CV ECHO MEAS - RV V1 MAX: 69.9 CM/SEC
BH CV ECHO MEAS - RV V1 MEAN: 43.3 CM/SEC
BH CV ECHO MEAS - RV V1 VTI: 15.4 CM
BH CV ECHO MEAS - RVDD: 3.3 CM
BH CV ECHO MEAS - RVSP: 35 MMHG
BH CV ECHO MEAS - SI(AO): 108.2 ML/M^2
BH CV ECHO MEAS - SI(CUBED): 26.2 ML/M^2
BH CV ECHO MEAS - SI(LVOT): 32.8 ML/M^2
BH CV ECHO MEAS - SI(MOD-SP2): 18.8 ML/M^2
BH CV ECHO MEAS - SI(MOD-SP4): 16.1 ML/M^2
BH CV ECHO MEAS - SI(TEICH): 21 ML/M^2
BH CV ECHO MEAS - SV(AO): 241.2 ML
BH CV ECHO MEAS - SV(CUBED): 58.3 ML
BH CV ECHO MEAS - SV(LVOT): 73.2 ML
BH CV ECHO MEAS - SV(MOD-SP2): 42 ML
BH CV ECHO MEAS - SV(MOD-SP4): 36 ML
BH CV ECHO MEAS - SV(TEICH): 46.9 ML
BH CV ECHO MEAS - TR MAX VEL: 318 CM/SEC
BH CV VAS BP LEFT ARM: NORMAL MMHG

## 2020-10-02 ENCOUNTER — OFFICE VISIT (OUTPATIENT)
Dept: CARDIOLOGY | Facility: CLINIC | Age: 70
End: 2020-10-02

## 2020-10-02 DIAGNOSIS — I37.1 NONRHEUMATIC PULMONARY VALVE INSUFFICIENCY: ICD-10-CM

## 2020-10-02 DIAGNOSIS — I36.1 NONRHEUMATIC TRICUSPID VALVE REGURGITATION: ICD-10-CM

## 2020-10-02 DIAGNOSIS — I27.20 PULMONARY HYPERTENSION (HCC): Primary | ICD-10-CM

## 2020-10-02 DIAGNOSIS — I34.0 NONRHEUMATIC MITRAL VALVE REGURGITATION: ICD-10-CM

## 2020-10-02 DIAGNOSIS — E78.2 MIXED HYPERLIPIDEMIA: ICD-10-CM

## 2020-10-02 PROCEDURE — 99442 PR PHYS/QHP TELEPHONE EVALUATION 11-20 MIN: CPT | Performed by: INTERNAL MEDICINE

## 2020-10-02 NOTE — PROGRESS NOTES
Pulmonary Arterial Hypertension & Heart Failure   Vinay Browning M.D., Ph.D., [facility]              Non-Face-To-Face Scheduled Telephone Service    Conrado Cazares is a 70 y.o. male who has requested telephone service in lieu of a follow-up appointment for results and advice.  This patient is an established patient.  There is no face-to-face service planned within the next 24 hours.  There also has been no E/M in the past 7 days.      Conrado Cazares is a 70 y.o. male who is followed in my clinic typically.     He was sent for a 2D echocardiogram which showed pulmonary hypertension.  PA pressures were elevated.  The patient's right ventricle was dilated, but had normal RVEF.  He was found to have significant pulmonary insufficiency and tricuspid regurgitation.  He was also found to have mitral regurgitation.  He was also found to have trace pericardial effusion.  He is a heavy former smoker.  Unfortunately, he continues to vape a product with nicotine.  He does have CKD with proteinuria and this has been attributed as a cause of his lower extremity edema.  He remains on Bumex with nephrology. He did see Dr. Cazares and was diagnosed with COPD.  He started on inhalers for COPD.  His TTE showed an LVEF in the 40s.  This was concerning for potential heart failure with borderline ejection fraction.  This prompted an ischemia evaluation with a stress echocardiogram.  Unfortunately, this was a high risk study.  Patient underwent invasive coronary angiography recently.  Again, this was prompted by his stress echocardiogram.  This showed severe two-vessel native disease.  The patient's LAD had a 80% focal ostial stenosis after the takeoff of the second diagonal.  There is filling of the LAD from the LIMA.  The patient's left circumflex had nonobstructive disease.  Patient's RCA was 100% in the proximal segment.  The LIMA to the LAD was patent.  The SVG to the RPDA retrogradely filled the RPLV system.  He  was recommended to undergo optimal medical therapy. He has not had any recent usage of nitro. He is doing well since his [facility]. No complications.     He has continued to vape. He continues to see Dr. Cazares. His breathing is stable.     Review of Systems   Cardiovascular: Negative for chest pain.   Respiratory: Positive for cough, shortness of breath, snoring and wheezing.        Today, he reports his BP is typically 150s to 160s. He now sees a different PCP.     family history includes Heart disease in an other family member.   reports that he has been smoking electronic cigarette. He has never used smokeless tobacco. He reports that he does not drink alcohol or use drugs.  Allergies   Allergen Reactions   • Dopamine Dizziness     Dropped BP        Current Outpatient Medications:   •  albuterol sulfate  (90 Base) MCG/ACT inhaler, Inhale 2 puffs Every 4 (Four) Hours As Needed for Wheezing or Shortness of Air., Disp: 18 g, Rfl: 11  •  amLODIPine (NORVASC) 5 MG tablet, Take 5 mg by mouth Daily., Disp: , Rfl:   •  aspirin 81 MG tablet, Take 1 tablet by mouth Daily., Disp: 30 tablet, Rfl: 11  •  atorvastatin (LIPITOR) 20 MG tablet, TAKE 1 TABLET BY MOUTH ONCE DAILY AT NIGHT, Disp: 90 tablet, Rfl: 0  •  bumetanide (BUMEX) 2 MG tablet, Take 2 mg by mouth Daily., Disp: , Rfl:   •  Cholecalciferol (VITAMIN D3) 1000 units capsule, Take 1,000 Units by mouth Daily., Disp: , Rfl:   •  cilostazol (PLETAL) 100 MG tablet, TAKE ONE TABLET BY MOUTH TWICE DAILY (Patient taking differently: Take 100 mg by mouth 2 (Two) Times a Day.), Disp: 60 tablet, Rfl: 5  •  escitalopram (LEXAPRO) 20 MG tablet, Take 10 mg by mouth Daily. 3 tablets by mouth daily, Disp: , Rfl:   •  gabapentin (NEURONTIN) 100 MG capsule, Take 100 mg by mouth 2 (Two) Times a Day. 1 am 2 pm, Disp: , Rfl:   •  hydrALAZINE (APRESOLINE) 25 MG tablet, Take 50 mg by mouth 2 (two) times a day., Disp: , Rfl:   •  lisinopril (PRINIVIL,ZESTRIL) 20 MG tablet, Take 20 mg by  mouth Daily., Disp: , Rfl:   •  nitroglycerin (NITROSTAT) 0.4 MG SL tablet, Place 0.4 mg under the tongue Every 5 (Five) Minutes As Needed for Chest Pain. Take no more than 3 doses in 15 minutes., Disp: , Rfl:   •  O2 (OXYGEN), Inhale 1 (One) Time. Oxygen at night, Disp: , Rfl:   •  omega-3 acid ethyl esters (LOVAZA) 1 G capsule, Take 2 g by mouth 2 (Two) Times a Day., Disp: , Rfl:   •  pantoprazole (PROTONIX) 40 MG EC tablet, Take 40 mg by mouth Daily., Disp: , Rfl:   •  primidone (MYSOLINE) 250 MG tablet, Take  by mouth 2 (Two) Times a Day., Disp: , Rfl:   •  propranolol LA (INDERAL LA) 160 MG 24 hr capsule, Take 160 mg by mouth Daily., Disp: , Rfl:   •  rOPINIRole (REQUIP) 1 MG tablet, Take 2 mg by mouth Every Night., Disp: , Rfl:   •  umeclidinium-vilanterol (ANORO ELLIPTA) 62.5-25 MCG/INH aerosol powder  inhaler, Inhale 1 puff Daily., Disp: 1 each, Rfl: 11      DATA REVIEWED:     TTE/BRIANNA:  Results for orders placed in visit on 03/02/20   Adult Transthoracic Echo Complete W/ Cont if Necessary Per Protocol    Narrative · Left ventricular systolic function is normal. LVEF is 51-55%. Mild   concentric hypertrophy. Pseudo-normal diastolic function.  · Right ventricular systolic function is normal.  · Mild to moderate tricuspid regurgitation.  · Moderate pulmonic valve regurgitation.  · Mild mitral regurgitation.          --------------------------------------------------------------------------------------------------  LABS:     The CVD Risk score (Tali et al., 2008) failed to calculate for the following reasons:    The patient has a prior MI, stroke, CHF, or peripheral vascular disease diagnosis  Lab Results   Component Value Date    GLUCOSE 151 (H) 02/04/2020    BUN 20 02/04/2020    CREATININE 1.42 (H) 02/04/2020    EGFRIFNONA 50 (L) 02/04/2020    BCR 14.1 02/04/2020    K 4.0 02/04/2020    CO2 27.0 02/04/2020    CALCIUM 9.8 02/04/2020     Lab Results   Component Value Date    WBC 4.79 02/04/2020    HGB 14.5  02/04/2020    HCT 41.7 02/04/2020    MCV 89.5 02/04/2020     02/04/2020     No results found for: CHOL, CHLPL, TRIG, HDL, LDL, LDLDIRECT  No results found for: TSH, P0XBXHA, A5KCZXJ, THYROIDAB  No results found for: CKTOTAL, CKMB, CKMBINDEX, TROPONINI, TROPONINT  Lab Results   Component Value Date    HGBA1C 6.7 (H) 07/15/2019     No results found for: DDIMER  No results found for: ALT  Lab Results   Component Value Date    HGBA1C 6.7 (H) 07/15/2019    HGBA1C 7.1 (H) 04/17/2019     Lab Results   Component Value Date    CREATININE 1.42 (H) 02/04/2020     No results found for: IRON, TIBC, FERRITIN  Lab Results   Component Value Date    INR 0.97 02/04/2020    PROTIME 12.7 02/04/2020       Assessment/Plan     1. Pulmonary hypertension (CMS/HCC). PAH/PVH. WHO Group 3; FC: III.  RV status: Abnormal:.   Patient appears euvolemic. Perfusion status: good.  Last 6MWT: Pending.  I discussed with him today that he likely has a pulmonary etiology for his PAH, likely COPD.   -Discussed evaluation, treatment and usual course.  -No current indication for PAH-specific medications  -No compelling indication at this time for RHC  -Will continue active clinical surveillance.  Signs and symptoms of worsening PAH and RV failure were discussed.  -I have asked the patient that if they were to move to be certain they see someone with expertise in PAH. These providers can be located at [email or web address].   -6MWT and BNP    2. Nonrheumatic mitral valve regurgitation/Nonrheumatic tricuspid valve regurgitation/Nonrheumatic pulmonary valve insufficiency. ACC stage B. There are no surgical indications at this time. Signs and symptoms of worsening valve disease discussed.  I've asked the patient contact me for an earlier appointment if these develop. The patient has been advised to remain in clinical surveillance. I've recommended a repeat 2D TTE every 1 year with the next TTE due: 10/2021.  · No indication based on 2017 ACC/AHA  guidelines for IE prophylaxis for dental procedures: Optimal oral health is recommended through regular professional dental care and the use of appropriate dental products, such as manual, powered, and ultrasonic toothbrushes; dental floss; and other plaque-removal devices      3. Dyslipidemia.   -Check lipid profile    Return in about 3 months (around 1/2/2021).        Time: >10    Vinay Browning MD PhD    You have chosen to receive care through a telephone visit. Do you consent to use a telephone visit for your medical care today? Yes           This document has been electronically signed by Vinay Browning MD PhD on October 2, 2020 08:36 CDT

## 2020-12-10 ENCOUNTER — TELEPHONE (OUTPATIENT)
Dept: CARDIOLOGY | Facility: CLINIC | Age: 70
End: 2020-12-10

## 2020-12-10 NOTE — TELEPHONE ENCOUNTER
Called patient to remind him of the lab work, he asked to lab work to be faxed to his PCP in [place].   Will do so per his request

## 2021-01-14 NOTE — PROGRESS NOTES
Cardiovascular Medicine      Vinay Browning M.D., Ph.D., [facility]             History of Present Illness  This is a 70 y.o. male with:    1. PAH  2. MR  3. TR  4. PI  5. Pericardial effusion  6. ASCAD with remote CABG, 2007  A. [facility] 2020 with 2/2 grafts patent  7. Risks: Obese, former smoker, PAD, HLD, HTN, CKD    Conrado Cazares is a 70 y.o. male who presents in follow-up.     Patient returns today for pulmonary hypertension.  2D echocardiogram showed elevated pulmonary pressures.  PFTs were consistent with COPD.  He was seeing pulmonary medicine.  He also developed a cardiomyopathy with a left ventricular ejection fraction of 40%.  This prompted an ischemia evaluation with a stress echocardiogram.  This was a high risk study so he was referred for invasive coronary angiography.  This showed severe two-vessel native disease.  The patient's LAD had an 80% focal ostial stenosis after the takeoff of the second diagonal.  There is filling of the LAD from the LIMA.  The patient's left circumflex had nonobstructive disease.  The RCA was 100% on the proximal segment.  LIMA to LAD was patent.  The SVG to the right PDA retrogradely filled the RPLV system.  He was recommended to undergo ongoing medication management.  He is prescribed aspirin, atorvastatin, propranolol and lisinopril.  He has had some intermittent CPS. This is non-exertional. He does have a prescription for nitroglycerin.  He has had two episodes of using nitro.  Is also followed for multi valvular heart disease including degrees of mitral, tricuspid and pulmonic regurgitation. Unfortunately, he continues to Ecig with nicotine.     The following portions of the patient's history were reviewed and updated as appropriate: allergies, current medications, past family history, past medical history, past social history, past surgical history and problem list.      Review of Systems - Review of Systems   Cardiovascular: Positive for chest pain, dyspnea on  exertion and leg swelling. Negative for claudication, cyanosis, irregular heartbeat, near-syncope, orthopnea, palpitations, paroxysmal nocturnal dyspnea and syncope.   Respiratory: Positive for shortness of breath. Negative for cough, hemoptysis and sleep disturbances due to breathing.    All other systems reviewed and are negative.       All other systems were reviewed and were negative.    family history includes Heart disease in an other family member.     reports that he has been smoking electronic cigarette. He has never used smokeless tobacco. He reports that he does not drink alcohol or use drugs.    Allergies   Allergen Reactions   • Dopamine Dizziness     Dropped BP          Current Outpatient Medications:   •  albuterol sulfate  (90 Base) MCG/ACT inhaler, Inhale 2 puffs Every 4 (Four) Hours As Needed for Wheezing or Shortness of Air., Disp: 18 g, Rfl: 11  •  amLODIPine (NORVASC) 5 MG tablet, Take 5 mg by mouth Daily., Disp: , Rfl:   •  aspirin 81 MG tablet, Take 325 mg by mouth Daily., Disp: 30 tablet, Rfl: 11  •  atorvastatin (LIPITOR) 20 MG tablet, TAKE 1 TABLET BY MOUTH ONCE DAILY AT NIGHT, Disp: 90 tablet, Rfl: 0  •  bumetanide (BUMEX) 2 MG tablet, Take 2 mg by mouth Daily., Disp: , Rfl:   •  Cholecalciferol (VITAMIN D3) 1000 units capsule, Take 1,000 Units by mouth Daily., Disp: , Rfl:   •  cilostazol (PLETAL) 100 MG tablet, TAKE ONE TABLET BY MOUTH TWICE DAILY (Patient taking differently: Take 100 mg by mouth 2 (Two) Times a Day.), Disp: 60 tablet, Rfl: 5  •  escitalopram (LEXAPRO) 20 MG tablet, Take 10 mg by mouth Daily. 3 tablets by mouth daily, Disp: , Rfl:   •  gabapentin (NEURONTIN) 100 MG capsule, Take 100 mg by mouth 2 (Two) Times a Day. 1 am 2 pm, Disp: , Rfl:   •  hydrALAZINE (APRESOLINE) 25 MG tablet, Take 50 mg by mouth 2 (two) times a day., Disp: , Rfl:   •  lisinopril (PRINIVIL,ZESTRIL) 20 MG tablet, Take 20 mg by mouth Daily., Disp: , Rfl:   •  metFORMIN (GLUCOPHAGE) 500 MG  "tablet, Take 500 mg by mouth Daily., Disp: , Rfl:   •  nitroglycerin (NITROSTAT) 0.4 MG SL tablet, Place 0.4 mg under the tongue Every 5 (Five) Minutes As Needed for Chest Pain. Take no more than 3 doses in 15 minutes., Disp: , Rfl:   •  nystatin (MYCOSTATIN) 159271 UNIT/GM cream, APPLY  CREAM TOPICALLY TWICE DAILY TO AFFECTED AREA FOR 30 DAYS, Disp: , Rfl:   •  O2 (OXYGEN), Inhale 1 (One) Time. Oxygen at night, Disp: , Rfl:   •  omega-3 acid ethyl esters (LOVAZA) 1 G capsule, Take 2 g by mouth 2 (Two) Times a Day., Disp: , Rfl:   •  pantoprazole (PROTONIX) 40 MG EC tablet, Take 40 mg by mouth Daily., Disp: , Rfl:   •  primidone (MYSOLINE) 250 MG tablet, Take  by mouth 2 (Two) Times a Day., Disp: , Rfl:   •  propranolol LA (INDERAL LA) 160 MG 24 hr capsule, Take 160 mg by mouth Daily., Disp: , Rfl:   •  rOPINIRole (REQUIP) 1 MG tablet, Take 2 mg by mouth Every Night., Disp: , Rfl:   •  umeclidinium-vilanterol (ANORO ELLIPTA) 62.5-25 MCG/INH aerosol powder  inhaler, Inhale 1 puff Daily., Disp: 1 each, Rfl: 11  •  isosorbide mononitrate (IMDUR) 30 MG 24 hr tablet, Take 1 tablet by mouth Every Morning., Disp: 90 tablet, Rfl: 3      Physical Exam:  Vitals:    01/15/21 0807   BP: 140/76   BP Location: Left arm   Patient Position: Sitting   Cuff Size: Adult   Pulse: 66   SpO2: 96%   Weight: 105 kg (231 lb 12.8 oz)   Height: 180.3 cm (71\")   PainSc: 0-No pain     Pulse Ox: Normal  on room air  General: alert, appears stated age and cooperative     Body Habitus: obese    HEENT: Head: Normocephalic, no lesions, without obvious abnormality. No arcus senilis, xanthelasma or xanthomas.    JVP: Volume/Pulsation: Normal.  Normal waveforms.   Appropriate inspiratory decrease.  No Kussmaul's. No Janis's.     Precordium: Normal impulses. P2 is not palpable.  RV Heave: absent  LV Heave: absent  [place]:  normal size and placement  Palpable S4: absent.  Heart rate: normal    Heart Rhythm: regular     Heart Sounds: S1: normal " intensity  S2: normal intensity  S3: absent   S4: absent  Opening Snap: absent    A2-OS:  no  Pericardial Rub:  Absent   Ejection click: None      Murmurs:  absent   Extremity: moves all extremities equally.       DATA REVIEWED:     Results for orders placed in visit on 03/02/20   Adult Transthoracic Echo Complete W/ Cont if Necessary Per Protocol    Narrative · Left ventricular systolic function is normal. LVEF is 51-55%. Mild   concentric hypertrophy. Pseudo-normal diastolic function.  · Right ventricular systolic function is normal.  · Mild to moderate tricuspid regurgitation.  · Moderate pulmonic valve regurgitation.  · Mild mitral regurgitation.        2D TTE shows LVEF is 51-55%.  Mild to moderate tricuspid regurgitation.  Moderate pulmonic regurgitation.           Lab Results   Component Value Date    HGBA1C 6.7 (H) 07/15/2019     No results found for: DDIMER  No results found for: ALT  Lab Results   Component Value Date    HGBA1C 6.7 (H) 07/15/2019    HGBA1C 7.1 (H) 04/17/2019       Assessment/Plan      1. Pulmonary hypertension (CMS/HCC). PAH/PVH. WHO Group 3; FC: III.  RV status: Abnormal:.   Patient appears euvolemic. Perfusion status: good.    -Discussed evaluation, treatment and usual course.  -No current indication for PAH-specific medications  -No compelling indication at this time for RHC  -Will continue active clinical surveillance.  Signs and symptoms of worsening PAH and RV failure were discussed.  -I have asked the patient that if they were to move to be certain they see someone with expertise in PAH. These providers can be located at [email or web address].    2. Nonrheumatic mitral valve regurgitation/Nonrheumatic tricuspid valve regurgitation/Nonrheumatic pulmonary valve insufficiency. ACC stage B. There are no surgical indications at this time. Signs and symptoms of worsening valve disease discussed.  I've asked the patient contact me for an earlier appointment if these develop. The patient  has been advised to remain in clinical surveillance. I've recommended a repeat 2D TTE every 1 year with the next TTE due: 9/2021.  No indication based on 2017 ACC/AHA guidelines for IE prophylaxis for dental procedures: Optimal oral health is recommended through regular professional dental care and the use of appropriate dental products, such as manual, powered, and ultrasonic toothbrushes; dental floss; and other plaque-removal devices    3. Coronary artery disease involving native coronary artery of native heart with angina pectoris. No anginal symptoms. The patient has been revascularized.  Revascularization:  CABG. the patient had a abnormal stress echo in 2020.  Invasive coronary angiography confirmed severe native vessel disease in the LAD and a  of the RCA.  The LIMA was patent.  The vein graft to the PDA was patent. He has had some angina.   -Propranolol  -START Imdur  -ASA--81mg  -Lipitor (atorvastatin)  -SL nitro prn  -Call 911 immediately for concerning symptoms.    4.  Pericardial effusion.The prevalence of pericardial effusion is about 3%.  The patient is currently Asymptomatic.  The size of the effusion is small.  I briefly discussed the etiology of pericardial effusions.  A handout was also offered to the patient which explained the disease process, including signs of worsening pericardial effusion.  At this point I have counseled conservative management.   -Will repeat a 2D TTE this fall  -Please call for worsening signs or symptoms.    5. Cardiac Risk Assessment based on 2019 ACCF guidelines: Blood pressure assessment, dyslipidemia goals, diet, bodyweight and exercise:  A team-based approach is recommended for the control of risk factors associated with ASCAD.  As such, Conrado Cazares was requested to have ongoing follow-up with their PCP. BP is an important modifiable risk factor. I have asked the patient to see their PCP for BP monitoring and management, as needed. A diet emphasizing  intake of vegetables, fruits, nuts, whole grains and fish is recommended. Physical activity recommendations have been provided. The patient's BMI is recommended to be calculated at least annually.  The patient's BMI is Body mass index is 32.33 kg/m²..  Tobacco status is assessed at every visit based on established guidelines.  The patient's nicotine status: Social History    Tobacco Use      Smoking status: Current Every Day Smoker        Types: Electronic Cigarette      Smokeless tobacco: Never Used    Nicotine/tobacco cessation was advised. Risks discussed. Hand-out given. Time: 3 minutes.     Return in about 3 months (around 4/15/2021).

## 2021-01-15 ENCOUNTER — OFFICE VISIT (OUTPATIENT)
Dept: CARDIOLOGY | Facility: CLINIC | Age: 71
End: 2021-01-15

## 2021-01-15 VITALS
DIASTOLIC BLOOD PRESSURE: 76 MMHG | HEIGHT: 71 IN | HEART RATE: 66 BPM | OXYGEN SATURATION: 96 % | WEIGHT: 231.8 LBS | SYSTOLIC BLOOD PRESSURE: 140 MMHG | BODY MASS INDEX: 32.45 KG/M2

## 2021-01-15 DIAGNOSIS — I25.118 CORONARY ARTERY DISEASE OF NATIVE ARTERY OF NATIVE HEART WITH STABLE ANGINA PECTORIS (HCC): ICD-10-CM

## 2021-01-15 DIAGNOSIS — I37.1 NONRHEUMATIC PULMONARY VALVE INSUFFICIENCY: ICD-10-CM

## 2021-01-15 DIAGNOSIS — I31.39 PERICARDIAL EFFUSION: ICD-10-CM

## 2021-01-15 DIAGNOSIS — Z87.891 PERSONAL HISTORY OF TOBACCO USE, PRESENTING HAZARDS TO HEALTH: ICD-10-CM

## 2021-01-15 DIAGNOSIS — I27.20 PULMONARY HYPERTENSION (HCC): ICD-10-CM

## 2021-01-15 DIAGNOSIS — I27.20 PULMONARY HYPERTENSION (HCC): Primary | ICD-10-CM

## 2021-01-15 DIAGNOSIS — I36.1 NONRHEUMATIC TRICUSPID VALVE REGURGITATION: ICD-10-CM

## 2021-01-15 DIAGNOSIS — I34.0 NONRHEUMATIC MITRAL VALVE REGURGITATION: ICD-10-CM

## 2021-01-15 PROCEDURE — 99214 OFFICE O/P EST MOD 30 MIN: CPT | Performed by: INTERNAL MEDICINE

## 2021-01-15 PROCEDURE — 94618 PULMONARY STRESS TESTING: CPT | Performed by: INTERNAL MEDICINE

## 2021-01-15 RX ORDER — ISOSORBIDE MONONITRATE 30 MG/1
30 TABLET, EXTENDED RELEASE ORAL EVERY MORNING
Qty: 90 TABLET | Refills: 3 | Status: SHIPPED | OUTPATIENT
Start: 2021-01-15 | End: 2022-01-19

## 2021-01-15 RX ORDER — NYSTATIN 100000 U/G
CREAM TOPICAL
COMMUNITY
Start: 2020-12-02 | End: 2021-05-03

## 2021-01-15 NOTE — PROGRESS NOTES
Conrado Cazares  Procedure: 6 Minute Walk Test   Indication:pulm htn    Pretest: BP:140/76               HR:66               Sa02:96               Dyspnea:1               Fatigue:0    Post Test: BP:180/80               HR:104               Sa02:91               Dyspnea:3               Fatigue:3    First 6MWT:12/17/19    Supplemental oxygen during test:no    Stopped before 6 minutes:no    Pauses:none    Results in distance walked:316.46 m    Did individual experience any pain or discomfort:no    Attestation:  I was immediately available for the above test and agree with the data.     NYHA Functional Class III.    6MWT speed today is:  Community Ambulator (>0.8 m/s)        Vinay Browning MD PhD    -----------------------------------------------------------------------------------------------------------------  [facility] performs 6MWT to the ATS guidelines for 6MWT (2002). Predicted distance is from:      -------------------------------------------------------------------------------------------------------------

## 2021-02-22 RX ORDER — ATORVASTATIN CALCIUM 20 MG/1
TABLET, FILM COATED ORAL
Qty: 90 TABLET | Refills: 0 | Status: SHIPPED | OUTPATIENT
Start: 2021-02-22 | End: 2021-06-07

## 2021-03-19 ENCOUNTER — OFFICE VISIT (OUTPATIENT)
Dept: CARDIAC SURGERY | Facility: CLINIC | Age: 71
End: 2021-03-19

## 2021-03-19 VITALS
DIASTOLIC BLOOD PRESSURE: 80 MMHG | WEIGHT: 218 LBS | BODY MASS INDEX: 30.52 KG/M2 | HEIGHT: 71 IN | SYSTOLIC BLOOD PRESSURE: 169 MMHG | OXYGEN SATURATION: 98 % | HEART RATE: 61 BPM

## 2021-03-19 DIAGNOSIS — R91.1 LUNG NODULE: ICD-10-CM

## 2021-03-19 DIAGNOSIS — E78.2 MIXED HYPERLIPIDEMIA: ICD-10-CM

## 2021-03-19 DIAGNOSIS — I73.9 PVD (PERIPHERAL VASCULAR DISEASE) (HCC): ICD-10-CM

## 2021-03-19 DIAGNOSIS — I65.23 CAROTID STENOSIS, ASYMPTOMATIC, BILATERAL: Primary | ICD-10-CM

## 2021-03-19 PROCEDURE — 99214 OFFICE O/P EST MOD 30 MIN: CPT | Performed by: NURSE PRACTITIONER

## 2021-03-19 RX ORDER — BUMETANIDE 1 MG/1
1 TABLET ORAL DAILY
COMMUNITY

## 2021-03-20 NOTE — PROGRESS NOTES
CVTS Office Progress Note     3/19/2021    Conrado Cazares  1950    Chief Complaint:    Chief Complaint   Patient presents with   • Peripheral Vascular Disease       HPI:      PCP:  Deepa Servin APRN  Cardiology:  Dr. Browning  Nephrology:  Dr. Deras  Pulmonary: Dr. Cazares    70 y.o. male with HTN(stable, increased risk stroke, rupture), Hyperlipidemia(stable, increased risk cardiovascular events), Obesity(uncontrolled, increased risk cardiovascular events), Smoker(uncontrolled, increased risk cardiovascular events), COPD(stable, increased risk pulmonary complications) and Chronic Kidney Disease(stable, increased risk renal failure) CAD (prior CABG), PVD (chronic, asymptomatic), Carotid stenosis (new, increased risk of CVA).  former smoker and now uses e-cigarette.  Established with [facility] 2014 for claudication, repeat studies post medical management improved, no prior intervention, no current claudication.  Recent LHC grafts patent.  Serial CT scans for lung cancer screening followed by Dr. Cazares, no longer established.  Reports today for ASHWIN and carotid ultrasound.  No other associated signs, symptoms or modifying factors.    2007 CABG [facility], Dr. Sheikh     06/2014 ASHWIN: 0.66 tri/bi of the RIGHT, and 0.56 tri/bi of the LEFT  12/21/2015 ASHWIN:  RIGHT .80 triphasic.  LEFT .88 triphasic.  6/27/2016 ASHWIN:  RIGHT .88 triphasic.  LEFT .79 triphasic.  01/03/17   ASHWIN:  RIGHT .82  triphasic.  LEFT .70  Triphasic.  07/06/17  ASHWIN:  RIGHT .86  triphasic.  LEFT .70  Triphasic.  01/18/18   ASHWIN:  RIGHT 1.01  Triphasic.  LEFT .88  Triphasic.  07/19/18   ASHWIN:  RIGHT 0.96  Triphasic.  LEFT .79  Triphasic.  01/24/19 ASHWIN: RIGHT 1.08 Triphasic. LEFT 0.78 Biphasic from femoral to distal  8/1/2019 ASHWIN: Right 0.91 triphasic.  Left 0.80 triphasic.  8/2020 ASHWIN: Right 0.79 triphasic.  Left 0.70 triphasic pop, biphasic distally.   3/2021 ASHWIN: Right 0.91 triphasic.  Left 0.86 triphasic.    8/2020 Carotid Duplex: DIAZ 50-69%  mPSV 176c/s Ratio 2.2, LICA 0-49% mPSV 87c/s Ratio 1.5, Antegrade vertebrals  3/2021 Carotid Duplex: DIAZ 50-69% mPSV 176c/s Ratio 1.2, LICA 50-69% mPSV 145c/s Ratio 2.5, Antegrade vertebrals      The following portions of the patient's history were reviewed and updated as appropriate: allergies, current medications, past family history, past medical history, past social history, past surgical history and problem list.  Recent images independently reviewed.  Available laboratory values reviewed.    PMH:  Past Medical History:   Diagnosis Date   • Anxiety    • Class 1 obesity due to excess calories with serious comorbidity and body mass index (BMI) of 31.0 to 31.9 in adult 9/6/2019   • COPD (chronic obstructive pulmonary disease) (CMS/HCC)    • Dehydration    • Depression    • Dyslipidemia    • Emphysema of lung (CMS/HCC)    • Essential (primary) hypertension    • Essential hypertension    • GERD (gastroesophageal reflux disease)    • Heat exhaustion    • Hyperlipidemia    • Neuropathy     bilateral legs    • Nicotine dependence      other tobacco product, uncomplicated - vapor cig      • Other atherosclerosis of native arteries of extremities, bilateral legs (CMS/HCC)     with intermittent claudication   • Renal failure     stage 3   • Sleep apnea    • Tobacco dependence syndrome      Past Surgical History:   Procedure Laterality Date   • CARDIAC CATHETERIZATION     • CARDIAC CATHETERIZATION N/A 2/4/2020    Procedure: Left Heart Cath/PCI if indicated;  Surgeon: Cierra Franklin MD;  Location: [facility] INVASIVE LOCATION;  Service: Cardiology;  Laterality: N/A;   • CORONARY ARTERY BYPASS GRAFT     • GALLBLADDER SURGERY       Family History   Problem Relation Age of Onset   • Heart disease Other      Social History     Tobacco Use   • Smoking status: Current Every Day Smoker     Types: Electronic Cigarette   • Smokeless tobacco: Never Used   Substance Use Topics   • Alcohol use: No   • Drug use: No        ALLERGIES:  Allergies   Allergen Reactions   • Dopamine Dizziness     Dropped BP          MEDICATIONS:    Current Outpatient Medications:   •  albuterol sulfate  (90 Base) MCG/ACT inhaler, Inhale 2 puffs Every 4 (Four) Hours As Needed for Wheezing or Shortness of Air., Disp: 18 g, Rfl: 11  •  aspirin 81 MG tablet, Take 325 mg by mouth Daily., Disp: 30 tablet, Rfl: 11  •  atorvastatin (LIPITOR) 20 MG tablet, TAKE 1 TABLET BY MOUTH ONCE DAILY AT NIGHT, Disp: 90 tablet, Rfl: 0  •  bumetanide (BUMEX) 1 MG tablet, Take 1 mg by mouth Daily., Disp: , Rfl:   •  Cholecalciferol (VITAMIN D3) 1000 units capsule, Take 1,000 Units by mouth Daily., Disp: , Rfl:   •  cilostazol (PLETAL) 100 MG tablet, TAKE ONE TABLET BY MOUTH TWICE DAILY (Patient taking differently: Take 100 mg by mouth 2 (Two) Times a Day.), Disp: 60 tablet, Rfl: 5  •  escitalopram (LEXAPRO) 20 MG tablet, Take 10 mg by mouth Daily. 3 tablets by mouth daily, Disp: , Rfl:   •  gabapentin (NEURONTIN) 100 MG capsule, Take 100 mg by mouth 2 (Two) Times a Day. 1 am 2 pm, Disp: , Rfl:   •  hydrALAZINE (APRESOLINE) 25 MG tablet, Take 50 mg by mouth 2 (two) times a day., Disp: , Rfl:   •  isosorbide mononitrate (IMDUR) 30 MG 24 hr tablet, Take 1 tablet by mouth Every Morning., Disp: 90 tablet, Rfl: 3  •  lisinopril (PRINIVIL,ZESTRIL) 20 MG tablet, Take 20 mg by mouth Daily., Disp: , Rfl:   •  metFORMIN (GLUCOPHAGE) 500 MG tablet, Take 500 mg by mouth Daily., Disp: , Rfl:   •  nitroglycerin (NITROSTAT) 0.4 MG SL tablet, Place 0.4 mg under the tongue Every 5 (Five) Minutes As Needed for Chest Pain. Take no more than 3 doses in 15 minutes., Disp: , Rfl:   •  nystatin (MYCOSTATIN) 967498 UNIT/GM cream, APPLY  CREAM TOPICALLY TWICE DAILY TO AFFECTED AREA FOR 30 DAYS, Disp: , Rfl:   •  O2 (OXYGEN), Inhale 1 (One) Time. Oxygen at night, Disp: , Rfl:   •  omega-3 acid ethyl esters (LOVAZA) 1 G capsule, Take 2 g by mouth 2 (Two) Times a Day., Disp: , Rfl:   •   pantoprazole (PROTONIX) 40 MG EC tablet, Take 40 mg by mouth Daily., Disp: , Rfl:   •  primidone (MYSOLINE) 250 MG tablet, Take  by mouth 2 (Two) Times a Day., Disp: , Rfl:   •  propranolol LA (INDERAL LA) 160 MG 24 hr capsule, Take 160 mg by mouth Daily., Disp: , Rfl:   •  rOPINIRole (REQUIP) 1 MG tablet, Take 2 mg by mouth Every Night., Disp: , Rfl:   •  umeclidinium-vilanterol (ANORO ELLIPTA) 62.5-25 MCG/INH aerosol powder  inhaler, Inhale 1 puff Daily., Disp: 1 each, Rfl: 11      Review of Systems   Constitutional: Negative for chills, decreased appetite, fever and weight loss.   HENT: Negative for congestion, nosebleeds and sore throat.    Eyes: Negative for blurred vision, visual disturbance and visual halos.   Cardiovascular: Positive for dyspnea on exertion. Negative for chest pain, claudication and leg swelling.   Respiratory: Negative for cough, shortness of breath, sputum production and wheezing.    Endocrine: Negative for cold intolerance and polyuria.   Hematologic/Lymphatic: Negative for bleeding problem. Bruises/bleeds easily.        Does not report S/S of adverse bleeding event including nose bleeds, hematuria, melena, gingival bleeding, or hematemesis.   Skin: Positive for unusual hair distribution. Negative for flushing and nail changes.   Musculoskeletal: Positive for arthritis, back pain and joint pain.   Gastrointestinal: Negative for bloating, abdominal pain, hematemesis, melena, nausea and vomiting.   Genitourinary: Negative for flank pain and hematuria.   Neurological: Negative for brief paralysis, difficulty with concentration, focal weakness, light-headedness, loss of balance, numbness, paresthesias and weakness.        No amaurosis fugax, TIA, or CVA.     Psychiatric/Behavioral: Negative for altered mental status, depression, substance abuse and suicidal ideas.   Allergic/Immunologic: Negative for hives and persistent infections.         Vitals:    03/19/21 0903   BP: 169/80   BP Location:  "Left arm   Patient Position: Sitting   Cuff Size: Adult   Pulse: 61   SpO2: 98%   Weight: 98.9 kg (218 lb)   Height: 180.3 cm (71\")     Physical Exam   Constitutional: He is oriented to person, place, and time. He appears well-developed.   HENT:   Head: Normocephalic and atraumatic.   Eyes: Pupils are equal, round, and reactive to light. Conjunctivae are normal.   Cardiovascular: Normal rate.   No murmur heard.  Pulmonary/Chest: Effort normal and breath sounds normal. No respiratory distress.   Abdominal: Soft. Bowel sounds are normal. He exhibits no distension.   Musculoskeletal: Normal range of motion. No tenderness.   Neurological: He is alert and oriented to person, place, and time. No cranial nerve deficit.   Skin: Skin is warm and dry. Capillary refill takes 2 to 3 seconds.   There is no evidence of skin breakdown of the bilateral lower extremities.  BLE pink, no evidence of ischemia.  Feet are absent of dependent rubor.   Psychiatric: Judgment normal.   Nursing note and vitals reviewed.      Assessment & Plan     Independent Review of Studies  3/2021 Carotid Duplex: DIAZ 50-69% mPSV 176c/s Ratio 1.2, LICA 50-69% mPSV 145c/s Ratio 2.5, Antegrade vertebrals  3/2021 ASHWIN: Right 0.91 triphasic.  Left 0.86 triphasic.    1. Mixed hyperlipidemia  Lipid-lowering therapy has been proven beneficial in patients with cardio-vascular disease. Current guidelines recommend statin treatment for all patients with PAD,CAD and carotid stenosis. Statins are beneficial in preventing cardiovascular events, increasing functional capacity and lower the risk of adverse limb loss in PAD. Statins decrease the progression of plaque formation and may improve peripheral vessel lining, and aid in reversing atherosclerosis.    2. PVD (peripheral vascular disease) (CMS/HCC)  Moderate reduction in ratio bilaterally.  Asymptomatic.  LLE waveforms consistent with distal disease.   Check feet daily.      Repeat ASHWIN in 12 months    ASA, Statin, " Wade, BB, ACE    3. Carotid stenosis, asymptomatic, bilateral  Moderate R ICA disease, asymptomatic.    Repeat duplex in 6 months  Medical management as above    - Duplex Carotid Ultrasound CAR; Future    4. Lung nodule  Previously followed by pulmonary, no longer established.    >30PY smoking history.  Meets criteria for LDCT, patient understands and wishes to enroll in annual surveillance for lung cancer.      Will call with results.    - CT Chest Low Dose Wo; Future      Detailed discussion regarding risks, benefits, and treatment plan. Images independently reviewed. Patient understands, agrees, and wishes to proceed with plan.       This document has been electronically signed by MAL BelcherCNP-BC @  On March 19, 2021 19:16 CDT      EMR Dragon/Transcription disclaimer:   Much of this encounter note is an electronic transcription/translation of spoken language to printed text. The electronic translation of spoken language may permit erroneous, or at times, nonsensical words or phrases to be inadvertently transcribed; Although I have reviewed the note for such errors, some may still exist.

## 2021-04-06 ENCOUNTER — HOSPITAL ENCOUNTER (OUTPATIENT)
Dept: CT IMAGING | Facility: HOSPITAL | Age: 71
Discharge: HOME | End: 2021-04-06
Admitting: NURSE PRACTITIONER

## 2021-04-06 DIAGNOSIS — R91.1 LUNG NODULE: ICD-10-CM

## 2021-04-06 PROCEDURE — 71271 CT THORAX LUNG CANCER SCR C-: CPT

## 2021-04-27 ENCOUNTER — OFFICE VISIT (OUTPATIENT)
Dept: CARDIOLOGY | Facility: CLINIC | Age: 71
End: 2021-04-27

## 2021-04-27 VITALS
SYSTOLIC BLOOD PRESSURE: 140 MMHG | HEIGHT: 71 IN | OXYGEN SATURATION: 96 % | BODY MASS INDEX: 30.38 KG/M2 | HEART RATE: 95 BPM | DIASTOLIC BLOOD PRESSURE: 60 MMHG | WEIGHT: 217 LBS

## 2021-04-27 DIAGNOSIS — I37.1 NONRHEUMATIC PULMONARY VALVE INSUFFICIENCY: ICD-10-CM

## 2021-04-27 DIAGNOSIS — I34.0 NONRHEUMATIC MITRAL VALVE REGURGITATION: ICD-10-CM

## 2021-04-27 DIAGNOSIS — I36.1 NONRHEUMATIC TRICUSPID VALVE REGURGITATION: ICD-10-CM

## 2021-04-27 DIAGNOSIS — I27.20 PULMONARY HYPERTENSION (HCC): ICD-10-CM

## 2021-04-27 DIAGNOSIS — Z72.0 VAPES NICOTINE CONTAINING SUBSTANCE: ICD-10-CM

## 2021-04-27 DIAGNOSIS — I25.10 CORONARY ARTERY DISEASE INVOLVING NATIVE CORONARY ARTERY OF NATIVE HEART WITHOUT ANGINA PECTORIS: ICD-10-CM

## 2021-04-27 DIAGNOSIS — I27.29 PULMONARY HYPERTENSIVE VENOUS DISEASE (HCC): Primary | ICD-10-CM

## 2021-04-27 DIAGNOSIS — E66.09 CLASS 1 OBESITY DUE TO EXCESS CALORIES WITH SERIOUS COMORBIDITY AND BODY MASS INDEX (BMI) OF 30.0 TO 30.9 IN ADULT: ICD-10-CM

## 2021-04-27 PROBLEM — I31.39 PERICARDIAL EFFUSION: Status: RESOLVED | Noted: 2019-10-04 | Resolved: 2021-04-27

## 2021-04-27 PROCEDURE — 99214 OFFICE O/P EST MOD 30 MIN: CPT | Performed by: INTERNAL MEDICINE

## 2021-04-27 PROCEDURE — 94618 PULMONARY STRESS TESTING: CPT | Performed by: INTERNAL MEDICINE

## 2021-04-27 NOTE — PROGRESS NOTES
Cardiovascular Medicine      Vinay Browning M.D., Ph.D., [facility]             History of Present Illness  This is a 70 y.o. male with:    1. PAH  2. MR  3. TR  4. PI  5. Pericardial effusion  6. ASCAD with remote CABG, 2007  A. [facility] 2020 with 2/2 grafts patent  7. Risks: Obese, former smoker, PAD, HLD, HTN, CKD    Conrado Cazares is a 70 y.o. male who presents in follow-up.     Patient returns today for pulmonary hypertension.  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  He was seen for an abnormal 2D echocardiogram which showed elevated pulmonary pressures.  He was having exercise intolerance and exertional dyspnea.  PFTs were consistent with COPD.  He was seeing a pulmonologist.  He then developed a cardiomyopathy with LVEF of 40%.  This prompted an ischemia evaluation.  Invasive coronary angiography revealed two vessel disease.  The patient's LAD had an 80% focal ostial stenosis after the takeoff of the second diagonal.  There is filling of the LAD from the LIMA.  The patient's left circumflex had nonobstructive disease.  The RCA was 100% on the proximal segment.  LIMA to LAD was patent.  The SVG to the right PDA retrogradely filled the RPLV system.  He was recommended to undergo medication management.  He is currently prescribed aspirin, atorvastatin, isosorbide mononitrate and propranolol.  He does have no resting, exertional or nocturnal angina.  Unfortunately, he continues to use an e-cigarette.  This does contain nicotine.  He remains precontemplative.  He also has a prescription for as needed nitroglycerin.  He said no usage of this.  He is also followed for multi valvular heart disease including degrees of mitral, tricuspid and pulmonic insufficiency.  2D echocardiogram is up-to-date.  Presented today for a 6-minute walk test. He does report Bumex was changed by nephrology to 1mg PO daily. He reports worsening CKD. He does have stable LE edema.     The following  portions of the patient's history were reviewed and updated as appropriate: allergies, current medications, past family history, past medical history, past social history, past surgical history and problem list.      Review of Systems - Review of Systems   Cardiovascular: Positive for dyspnea on exertion and leg swelling. Negative for chest pain, claudication, cyanosis, irregular heartbeat, near-syncope, orthopnea, palpitations, paroxysmal nocturnal dyspnea and syncope.   Respiratory: Positive for shortness of breath. Negative for cough, hemoptysis and sleep disturbances due to breathing.    All other systems reviewed and are negative.       All other systems were reviewed and were negative.    family history includes Heart disease in an other family member.     reports that he has been smoking electronic cigarette. He has never used smokeless tobacco. He reports that he does not drink alcohol and does not use drugs.    Allergies   Allergen Reactions   • Dopamine Dizziness     Dropped BP          Current Outpatient Medications:   •  albuterol sulfate  (90 Base) MCG/ACT inhaler, Inhale 2 puffs Every 4 (Four) Hours As Needed for Wheezing or Shortness of Air., Disp: 18 g, Rfl: 11  •  aspirin 81 MG tablet, Take 325 mg by mouth Daily., Disp: 30 tablet, Rfl: 11  •  atorvastatin (LIPITOR) 20 MG tablet, TAKE 1 TABLET BY MOUTH ONCE DAILY AT NIGHT, Disp: 90 tablet, Rfl: 0  •  bumetanide (BUMEX) 1 MG tablet, Take 1 mg by mouth Daily., Disp: , Rfl:   •  Cholecalciferol (VITAMIN D3) 1000 units capsule, Take 1,000 Units by mouth Daily., Disp: , Rfl:   •  cilostazol (PLETAL) 100 MG tablet, TAKE ONE TABLET BY MOUTH TWICE DAILY (Patient taking differently: Take 100 mg by mouth 2 (Two) Times a Day.), Disp: 60 tablet, Rfl: 5  •  escitalopram (LEXAPRO) 20 MG tablet, Take 10 mg by mouth Daily. 3 tablets by mouth daily, Disp: , Rfl:   •  gabapentin (NEURONTIN) 100 MG capsule, Take 100 mg by mouth 2 (Two) Times a Day. 1 am 2 pm,  "Disp: , Rfl:   •  hydrALAZINE (APRESOLINE) 25 MG tablet, Take 50 mg by mouth 2 (two) times a day., Disp: , Rfl:   •  isosorbide mononitrate (IMDUR) 30 MG 24 hr tablet, Take 1 tablet by mouth Every Morning., Disp: 90 tablet, Rfl: 3  •  lisinopril (PRINIVIL,ZESTRIL) 20 MG tablet, Take 20 mg by mouth Daily., Disp: , Rfl:   •  metFORMIN (GLUCOPHAGE) 500 MG tablet, Take 500 mg by mouth Daily., Disp: , Rfl:   •  nitroglycerin (NITROSTAT) 0.4 MG SL tablet, Place 0.4 mg under the tongue Every 5 (Five) Minutes As Needed for Chest Pain. Take no more than 3 doses in 15 minutes., Disp: , Rfl:   •  nystatin (MYCOSTATIN) 751004 UNIT/GM cream, APPLY  CREAM TOPICALLY TWICE DAILY TO AFFECTED AREA FOR 30 DAYS, Disp: , Rfl:   •  O2 (OXYGEN), Inhale 1 (One) Time. Oxygen at night, Disp: , Rfl:   •  omega-3 acid ethyl esters (LOVAZA) 1 G capsule, Take 2 g by mouth 2 (Two) Times a Day., Disp: , Rfl:   •  pantoprazole (PROTONIX) 40 MG EC tablet, Take 40 mg by mouth Daily., Disp: , Rfl:   •  primidone (MYSOLINE) 250 MG tablet, Take  by mouth 2 (Two) Times a Day., Disp: , Rfl:   •  propranolol LA (INDERAL LA) 160 MG 24 hr capsule, Take 160 mg by mouth Daily., Disp: , Rfl:   •  rOPINIRole (REQUIP) 1 MG tablet, Take 2 mg by mouth Every Night., Disp: , Rfl:   •  umeclidinium-vilanterol (ANORO ELLIPTA) 62.5-25 MCG/INH aerosol powder  inhaler, Inhale 1 puff Daily., Disp: 1 each, Rfl: 11      Physical Exam:  Vitals:    04/27/21 1004   BP: 140/60   BP Location: Right arm   Patient Position: Sitting   Cuff Size: Adult   Pulse: 95   SpO2: 96%   Weight: 98.4 kg (217 lb)   Height: 180.3 cm (71\")   PainSc: 0-No pain     Pulse Ox: Normal  on room air  General: alert, appears stated age and cooperative     Body Habitus: obese    HEENT: Head: Normocephalic, no lesions, without obvious abnormality. No arcus senilis, xanthelasma or xanthomas.    JVP: Volume/Pulsation: Normal.  Normal waveforms.   Appropriate inspiratory decrease.  No Kussmaul's. No " Janis's.     Precordium: Normal impulses. P2 is not palpable.  RV Heave: absent  LV Heave: absent  Sayner:  normal size and placement  Palpable S4: absent.  Heart rate: normal    Heart Rhythm: regular     Heart Sounds: S1: normal intensity  S2: normal intensity  S3: absent   S4: absent  Opening Snap: absent    A2-OS:  no  Pericardial Rub:  Absent   Ejection click: None      Murmurs:  absent   Extremity: moves all extremities equally.       DATA REVIEWED:     Results for orders placed in visit on 03/02/20    Adult Transthoracic Echo Complete W/ Cont if Necessary Per Protocol    Interpretation Summary  · Left ventricular systolic function is normal. LVEF is 51-55%. Mild concentric hypertrophy. Pseudo-normal diastolic function.  · Right ventricular systolic function is normal.  · Mild to moderate tricuspid regurgitation.  · Moderate pulmonic valve regurgitation.  · Mild mitral regurgitation.  2D TTE, which I personally interpreted, shows LVEF of 51-55%.  Mild to moderate tricuspid regurgitation.  Mild mitral regurgitation.  Moderate pulmonic insufficiency.             Lab Results   Component Value Date    HGBA1C 6.3 12/22/2020     No results found for: DDIMER  No results found for: ALT  Lab Results   Component Value Date    HGBA1C 6.3 12/22/2020    HGBA1C 6.7 (H) 07/15/2019    HGBA1C 7.1 (H) 04/17/2019       Assessment/Plan      1. Pulmonary hypertensive venous disease (CMS/HCC)    2. Nonrheumatic mitral valve regurgitation    3. Nonrheumatic pulmonary valve insufficiency    4. Nonrheumatic tricuspid valve regurgitation    5. Coronary artery disease involving native coronary artery of native heart without angina pectoris    6. Vapes nicotine containing substance    7. Class 1 obesity due to excess calories with serious comorbidity and body mass index (BMI) of 30.0 to 30.9 in adult        1.  He is WHO-group-2/3.  PFTs were consistent with COPD.  He had also developed cardiomyopathy, but recently had improvement in  his LVEF.  Today, he is euvolemic and perfused.  There is no current compelling indication for RHC or initiation of PAH-specific medications.  Active clinical surveillance was recommended.  He was recommended to continue medications for COPD.  He was recommended to continue medications for his cardiomyopathy.  Smoking cessation.    2/3/4.  ACC stage B.  2D echocardiogram is up-to-date.  I've recommended a repeat 2D TTE every 1 year with the next TTE due: 9/2021.  No indication based on 2017 ACC/AHA guidelines for IE prophylaxis for dental procedures: Optimal oral health is recommended through regular professional dental care and the use of appropriate dental products, such as manual, powered, and ultrasonic toothbrushes; dental floss; and other plaque-removal devices    5.  CCS 0.  Most recent invasive coronary angiography confirms severe native vessel disease in the LAD and a  of the RCA.  YADAV was patent.  Vein graft to the PDA is patent.  He will continue propranolol, isosorbide mononitrate, aspirin, atorvastatin and as needed nitroglycerin.  Call 911 immediately for concerning symptoms.    6/7. Cardiac Risk Assessment based on ACCF guidelines  A team-based approach is recommended for the control of risk factors associated with ASCAD.  As such, Conrado Cazares was requested to have ongoing follow-up with their PCP. BP is an important modifiable risk factor. I have asked the patient to see their PCP for BP monitoring and management, as needed. A diet emphasizing intake of vegetables, fruits, nuts, whole grains and fish is recommended. Physical activity recommendations have been provided. The patient's BMI is recommended to be calculated at least annually.  The patient's BMI is Body mass index is 30.27 kg/m²..  Tobacco status is assessed at every visit based on established guidelines.  The patient's nicotine status: Social History    Tobacco Use      Smoking status: Current Every Day Smoker        Types:  Electronic Cigarette      Smokeless tobacco: Never Used    Nicotine/tobacco cessation was advised. Risks discussed. Hand-out given. Time: 3 minutes.     Dr. Son in 3 months

## 2021-04-27 NOTE — PROGRESS NOTES
Conrado Anatoliy Cazares  Procedure: 6 Minute Walk Test   Indication:pulmonary hypertension    Pretest: BP:140/60               HR:95               Sa02:96               Dyspnea:0.5               Fatigue:0    Post Test: BP:166/52               HR:83               Sa02:96               Dyspnea:2               Fatigue:2    First 6MWT:12/17/19    Supplemental oxygen during test no    Stopped before 6 minutes:no    Pauses:none    Results in distance walked:294.46 m    Did individual experience any pain or discomfort:no    Attestation:  I was immediately available for the above test and agree with the data.     NYHA Functional Class IV.    6MWT speed today is:  Community Ambulator (>0.8 m/s)        Vinay Browning MD PhD    -----------------------------------------------------------------------------------------------------------------  [facility] performs 6MWT to the ATS guidelines for 6MWT (2002). Predicted distance is from:      -------------------------------------------------------------------------------------------------------------

## 2021-04-27 NOTE — PATIENT INSTRUCTIONS
Steps to Quit Smoking  Smoking tobacco is the leading cause of preventable death. It can affect almost every organ in the body. Smoking puts you and those around you at risk for developing many serious chronic diseases. Quitting smoking can be difficult, but it is one of the best things that you can do for your health. It is never too late to quit.  How do I get ready to quit?  When you decide to quit smoking, create a plan to help you succeed. Before you quit:  · Pick a date to quit. Set a date within the next 2 weeks to give you time to prepare.  · Write down the reasons why you are quitting. Keep this list in places where you will see it often.  · Tell your family, friends, and co-workers that you are quitting. Support from your loved ones can make quitting easier.  · Talk with your health care provider about your options for quitting smoking.  · Find out what treatment options are covered by your health insurance.  · Identify people, places, things, and activities that make you want to smoke (triggers). Avoid them.  What first steps can I take to quit smoking?  · Throw away all cigarettes at home, at work, and in your car.  · Throw away smoking accessories, such as ashtrays and lighters.  · Clean your car. Make sure to empty the ashtray.  · Clean your home, including curtains and carpets.  What strategies can I use to quit smoking?  Talk with your health care provider about combining strategies, such as taking medicines while you are also receiving in-person counseling. Using these two strategies together makes you more likely to succeed in quitting than if you used either strategy on its own.  · If you are pregnant or breastfeeding, talk with your health care provider about finding counseling or other support strategies to quit smoking. Do not take medicine to help you quit smoking unless your health care provider tells you to do so.  To quit smoking:  Quit right away  · Quit smoking completely, instead of  gradually reducing how much you smoke over a period of time. Research shows that stopping smoking right away is more successful than gradually quitting.  · Attend in-person counseling to help you build problem-solving skills. You are more likely to succeed in quitting if you attend counseling sessions regularly. Even short sessions of 10 minutes can be effective.  Take medicine  You may take medicines to help you quit smoking. Some medicines require a prescription and some you can purchase over-the-counter. Medicines may have nicotine in them to replace the nicotine in cigarettes. Medicines may:  · Help to stop cravings.  · Help to relieve withdrawal symptoms.  Your health care provider may recommend:  · Nicotine patches, gum, or lozenges.  · Nicotine inhalers or sprays.  · Non-nicotine medicine that is taken by mouth.  Find resources  Find resources and support systems that can help you to quit smoking and remain smoke-free after you quit. These resources are most helpful when you use them often. They include:  · Online chats with a counselor.  · Telephone quitlines.  · Printed self-help materials.  · Support groups or group counseling.  · Text messaging programs.  · Mobile phone apps or applications. Use apps that can help you stick to your quit plan by providing reminders, tips, and encouragement. There are many free apps for mobile devices as well as websites. Examples include Quit Guide from the CDC and smokefree.gov  What things can I do to make it easier to quit?    · Reach out to your family and friends for support and encouragement. Call telephone quitlines (4-804-QUIT-NOW), reach out to support groups, or work with a counselor for support.  · Ask people who smoke to avoid smoking around you.  · Avoid places that trigger you to smoke, such as bars, parties, or smoke-break areas at work.  · Spend time with people who do not smoke.  · Lessen the stress in your life. Stress can be a smoking trigger for some  people. To lessen stress, try:  ? Exercising regularly.  ? Doing deep-breathing exercises.  ? Doing yoga.  ? Meditating.  ? Performing a body scan. This involves closing your eyes, scanning your body from head to toe, and noticing which parts of your body are particularly tense. Try to relax the muscles in those areas.  How will I feel when I quit smoking?  Day 1 to 3 weeks  Within the first 24 hours of quitting smoking, you may start to feel withdrawal symptoms. These symptoms are usually most noticeable 2-3 days after quitting, but they usually do not last for more than 2-3 weeks. You may experience these symptoms:  · Mood swings.  · Restlessness, anxiety, or irritability.  · Trouble concentrating.  · Dizziness.  · Strong cravings for sugary foods and nicotine.  · Mild weight gain.  · Constipation.  · Nausea.  · Coughing or a sore throat.  · Changes in how the medicines that you take for unrelated issues work in your body.  · Depression.  · Trouble sleeping (insomnia).  Week 3 and afterward  After the first 2-3 weeks of quitting, you may start to notice more positive results, such as:  · Improved sense of smell and taste.  · Decreased coughing and sore throat.  · Slower heart rate.  · Lower blood pressure.  · Clearer skin.  · The ability to breathe more easily.  · Fewer sick days.  Quitting smoking can be very challenging. Do not get discouraged if you are not successful the first time. Some people need to make many attempts to quit before they achieve long-term success. Do your best to stick to your quit plan, and talk with your health care provider if you have any questions or concerns.  Summary  · Smoking tobacco is the leading cause of preventable death. Quitting smoking is one of the best things that you can do for your health.  · When you decide to quit smoking, create a plan to help you succeed.  · Quit smoking right away, not slowly over a period of time.  · When you start quitting, seek help from your  health care provider, family, or friends.  This information is not intended to replace advice given to you by your health care provider. Make sure you discuss any questions you have with your health care provider.  Document Revised: 09/11/2020 Document Reviewed: 03/07/2020  Elsevier Patient Education © 2021 [employer]      Pulmonary Hypertension  Pulmonary hypertension is a long-term (chronic) condition in which there is high blood pressure in the arteries in the lungs (pulmonary arteries). This condition occurs when pulmonary arteries become narrow and tight, making it harder for blood to flow through the lungs. This in turn makes the heart work harder to pump blood through the lungs, making it harder for you to breathe.  Over time, pulmonary hypertension can weaken and damage the heart muscle, specifically the right side of the heart. Pulmonary hypertension is a serious condition that can be life-threatening.  What are the causes?  This condition may be caused by different medical conditions. It can be categorized by cause into five groups:  · Group 1: Pulmonary hypertension that is caused by abnormal growth of small blood vessels in the lungs (pulmonary arterial hypertension). The abnormal blood vessel growth may have no known cause, or it may be:  ? Passed from parent to child (hereditary).  ? Caused by another disease, such as a connective tissue disease (including lupus or scleroderma), congenital heart disease, liver disease, or HIV.  ? Caused by certain medicines or poisons (toxins).  · Group 2: Pulmonary hypertension that is caused by weakness of the left chamber of the heart (left ventricle) or heart valve disease.  · Group 3: Pulmonary hypertension that is caused by lung disease or low oxygen levels. Causes in this group include:  ? Emphysema or chronic obstructive pulmonary disease (COPD).  ? Untreated sleep apnea.  ? Pulmonary fibrosis.  ? Long-term exposure to high altitudes in certain people who  may already be at higher risk for pulmonary hypertension.  · Group 4: Pulmonary hypertension that is caused by blood clots in the lungs (pulmonary emboli).  · Group 5: Other causes of pulmonary hypertension, such as sickle cell anemia, sarcoidosis, tumors pressing on the pulmonary arteries, and various other diseases.  What are the signs or symptoms?  Symptoms of this condition include:  · Shortness of breath. You may notice shortness of breath with:  ? Activity, such as walking.  ? Minimal activity, such as getting dressed.  ? No activity, like when you are sitting still.  · A cough. Sometimes, bloody mucus from the lungs may be coughed up (hemoptysis).  · Tiredness and fatigue.  · Dizziness, lightheadedness, or fainting, especially with physical activity.  · Rapid heartbeat, or feeling your heart flutter or skip a beat (palpitations).  · Veins in the neck getting larger.  · Swelling of the lower legs, abdomen, or both.  · Bluish color of the lips and fingertips.  · Chest pain or tightness in the chest.  · Abdominal pain, especially in the upper abdomen.  How is this diagnosed?  This condition may be diagnosed based on one or more of the following tests:  · Chest X-ray.  · Blood tests.  · CT scan.  · Pulmonary function test. This test measures how much air your lungs can hold. It also tests how well air moves in and out of your lungs.  · 6-minute walk test. This tests how severe your condition is in relation to your activity levels.  · Electrocardiogram (ECG). This test records the electrical impulses of the heart.  · Echocardiogram. This test uses sound waves (ultrasound) to produce an image of the heart.  · Cardiac catheterization. This is a procedure in which a thin tube (catheter) is passed into the pulmonary artery and used to test the pressure in your pulmonary artery and the right side of your heart.  · Lung biopsy. This involves having a procedure to remove a small sample of lung tissue for testing. This  may help determine an underlying cause of your pulmonary hypertension.  How is this treated?  There is no cure for this condition, but treatment can help to relieve symptoms and slow the progress of the condition. Treatment may include:  · Cardiac rehabilitation. This is a treatment program that includes exercise training, education, and counseling to help you get stronger and return to an active lifestyle.  · Oxygen therapy.  · Medicines that:  ? Lower blood pressure.  ? Relax (dilate) the pulmonary blood vessels.  ? Help the heart beat more efficiently and pump more blood.  ? Help the body get rid of extra fluid (diuretics).  ? Thin the blood in order to prevent blood clots in the lungs.  · Lung surgery to relieve pressure on the heart, for severe cases that do not respond to medical treatment.  · Heart-lung transplant, or lung transplant. This may be done in very severe cases.  Follow these instructions at home:  Eating and drinking    · Eat a healthy diet that includes plenty of fresh fruits and vegetables, whole grains, and beans.  · Limit your salt (sodium) intake to less than 2,300 mg a day.  Lifestyle  · Do not use any products that contain nicotine or tobacco, such as cigarettes and e-cigarettes. If you need help quitting, ask your health care provider.  · Avoid secondhand smoke.  Activity  · Get plenty of rest.  · Exercise as directed. Talk with your health care provider about what type of exercise is safe for you.  · Avoid hot tubs and saunas.  · Avoid high altitudes.  General instructions  · Take over-the-counter and prescription medicines only as told by your health care provider. Do not change or stop medicines without checking with your health care provider.  · Stay up to date on your vaccines, especially yearly flu (influenza) and pneumonia vaccines.  · If you are a woman of child-bearing age, avoid becoming pregnant. Talk with your health care provider about birth control.  · Consider ways to get  support for anxiety and stress of living with pulmonary hypertension. Talk with your health care provider about support groups and online resources.  · Use oxygen therapy at home as directed.  · Keep track of your weight. Weight gain could be a sign that your condition is getting worse.  · Keep all follow-up visits as told by your health care provider. This is important.  Contact a health care provider if:  · Your cough gets worse.  · You have more shortness of breath than usual, or you start to have trouble doing activities that you could do before.  · You need to use medicines or oxygen more frequently or in higher dosages than usual.  Get help right away if:  · You have severe shortness of breath.  · You have chest pain or pressure.  · You cough up blood.  · You have swelling of your feet or legs that gets worse.  · You have rapid weight gain over a period of 1-2 days.  · Your medicines or oxygen do not provide relief.  Summary  · Pulmonary hypertension is a chronic condition in which there is high blood pressure in the arteries in the lungs (pulmonary arteries).  · Pulmonary hypertension is a serious condition that can be life-threatening. It can be caused by a variety of illnesses.  · Treatment may involve taking medicines and using oxygen therapy. Severe cases may require surgery or a transplant.  This information is not intended to replace advice given to you by your health care provider. Make sure you discuss any questions you have with your health care provider.  Document Revised: 11/30/2018 Document Reviewed: 03/13/2018  [employer] Patient Education © 2021 [employer]    Electronic Cigarette Information    Electronic cigarettes, or e-cigarettes, are battery-operated devices that deliver nicotine--a very addictive drug--to the body. They come in many shapes, including in the shape of a cigarette, pipe, pen, and even a USB memory stick. E-cigarettes have a cartridge that contains a liquid form of nicotine.  When a person uses the device, the liquid heats up. It then becomes a vapor. Inhaling this vapor is called vaping.  Nicotine is thought to increase your risk for certain types of cancer. In addition to nicotine, e-cigarettes may contain other harmful and cancer-causing chemicals, including:  · Formaldehyde.  · Acetaldehyde.  · Heavy metals.  · Ultra-fine particles that can get inhaled deep into the lungs.  · Chemical colorings and flavorings.  It is not clear how much nicotine you get when vaping, and it is hard to know what chemicals are in the vaping liquids. The health effects of vaping are not completely known, but you should be aware of the possible dangers of using these products.  Some people may use e-cigarettes in order to quit smoking tobacco. However, this has not been proven to work, and the Food and Drug Administration (FDA) has not approved e-cigarettes for this purpose.  How can using electronic cigarettes affect me?  · You may be at risk for developing a dangerous lung disease. There are reports of an increasing number of cases involving serious lung problems, and even death, associated with e-cigarette use. Your risk may be even higher if you:  ? Buy e-cigarettes or vaping oils off the street.  ? Add any substances to the e-cigarettes that are not intended by the .  · Vaping may make you crave nicotine. Nicotine does the following:  ? Changes your blood sugar levels.  ? Increases your heart rate, blood pressure, and breathing rate.  ? Increases your risk of developing blood clots (hypercoagulable state) and diabetes.  ? Increases your risk of gum disease that may lead to losing teeth.  · If you smoke e-cigarettes, you may be more likely to start smoking or to smoke more tobacco cigarettes.  · Becoming addicted to nicotine may make your brain more sensitive to other addictive drugs. You may move to other addictive substances.  · You may be in danger of overdosing on nicotine. Nicotine  poisoning can cause nausea, vomiting, seizures, and trouble breathing.  · An e-cigarette may explode and cause fires and burn injuries.  · If you are pregnant, the nicotine in e-cigarettes may be harmful to your baby. Nicotine can cause:  ? Brain or lung problems for your baby.  ? Your baby to be born too early.  ? Your baby to be born with a low birth weight.  · Vaping has also been linked to decreases in memory and attention span in children and teens.  What actions can I take to stop vaping?  If you can, stop vaping on your own before you become addicted to nicotine. If you need help quitting, ask your health care provider. There are three effective ways to fight nicotine addiction:  · Nicotine replacement therapy. Using nicotine gum or a nicotine patch blocks your craving for nicotine. Over time, you can reduce the amount of nicotine you use until you can stop using nicotine completely without having cravings.  · Prescription medicines approved to fight nicotine addiction. These stop nicotine cravings or block the effects of nicotine.  · Behavioral therapy. This may include:  ? A self-help smoking cessation program.  ? Individual or group therapy.  ? A smoking cessation support group.  There are several national programs to help you quit smoking or vaping. These include:  · Text message programs, such as SmokefreeTXT.  · Apps for mobile phones, including the free [employer] francia.  · Hotlines, such as 0-348-QUIT-NOW (1-415.280.4182).  Where to find support  You can get support at these sites:  · U.[place]. Department of Health and Human Services: https://smokefree.gov  · American Lung Association: www.lung.org  Where to find more information  Learn more about e-cigarettes from:  · National [place] on Drug Abuse: www.drugabuse.gov  · Centers for Disease Control and Prevention: www.cdc.gov  Summary  · E-cigarettes can cause nicotine addiction.  · E-cigarettes are not approved as a way to stop smoking. They are not a  risk-free alternative to smoking tobacco.  · There are reports of an increasing number of cases involving serious lung problems, and even death, associated with e-cigarette use.  · If you can stop vaping on your own, do it before you become addicted to nicotine. If you need help quitting, ask your health care provider.  · There are various methods and programs that can help you stop smoking or vaping.  This information is not intended to replace advice given to you by your health care provider. Make sure you discuss any questions you have with your health care provider.  Document Revised: 04/14/2020 Document Reviewed: 02/28/2020  Elsevier Patient Education © 2021 [employer]

## 2021-06-07 RX ORDER — ATORVASTATIN CALCIUM 20 MG/1
TABLET, FILM COATED ORAL
Qty: 90 TABLET | Refills: 0 | Status: SHIPPED | OUTPATIENT
Start: 2021-06-07 | End: 2021-09-13 | Stop reason: SDUPTHER

## 2021-06-10 ENCOUNTER — OFFICE VISIT (OUTPATIENT)
Dept: PULMONOLOGY | Facility: CLINIC | Age: 71
End: 2021-06-10

## 2021-06-10 VITALS
HEIGHT: 71 IN | BODY MASS INDEX: 29.6 KG/M2 | HEART RATE: 57 BPM | SYSTOLIC BLOOD PRESSURE: 110 MMHG | WEIGHT: 211.4 LBS | OXYGEN SATURATION: 98 % | TEMPERATURE: 97.2 F | DIASTOLIC BLOOD PRESSURE: 68 MMHG

## 2021-06-10 DIAGNOSIS — J44.9 STAGE 3 SEVERE COPD BY GOLD CLASSIFICATION (HCC): Primary | ICD-10-CM

## 2021-06-10 DIAGNOSIS — Z72.0 VAPES NICOTINE CONTAINING SUBSTANCE: ICD-10-CM

## 2021-06-10 PROCEDURE — 99213 OFFICE O/P EST LOW 20 MIN: CPT | Performed by: INTERNAL MEDICINE

## 2021-06-10 RX ORDER — HYDRALAZINE HYDROCHLORIDE 50 MG/1
50 TABLET, FILM COATED ORAL 3 TIMES DAILY
COMMUNITY
Start: 2021-06-10

## 2021-06-10 NOTE — PROGRESS NOTES
Pulmonary Office Follow-up    Subjective     Conrado Cazares is seen today at the office for   Chief Complaint   Patient presents with   • COPD         HPI  Conrado Cazares is a 71 y.o. male   Patient here for routine follow up. He has been doing Anoro daily. Not really needing his Albuterol inhaler since he statrted Anoro. No ED visits or hospitalizations. Overall doing very well    Tobacco use history:  Type: cigarettes and electronic cigarettes  Amount: 2 ppd  Duration: 50 years  Cessation: 3/ 29/2014; still using e-cigarette  Willing to quit: Yes    Patient Active Problem List   Diagnosis   • PVD (peripheral vascular disease) (CMS/[facility])   • Essential hypertension   • Mixed hyperlipidemia   • Reactive depression   • Neuropathy   • Left carotid bruit   • Vapes nicotine containing substance   • Encounter for screening for lung cancer   • Stopped smoking with greater than 40 pack year history   • Pulmonary hypertensive venous disease (CMS/[facility])   • Nonrheumatic mitral valve regurgitation   • Nonrheumatic tricuspid valve regurgitation   • Nonrheumatic pulmonary valve insufficiency   • Coronary artery disease involving native coronary artery of native heart without angina pectoris   • Lung nodule   • HALIMA (obstructive sleep apnea)   • Stage 3 severe COPD by GOLD classification (CMS/[facility])   • Nocturnal hypoxemia   • Personal history of tobacco use, presenting hazards to health   • Dyspnea on exertion   • Abnormal stress echo   • Carotid stenosis, asymptomatic, bilateral         Medications, Allergies, Social, and Family Histories reviewed as per EMR.    Objective     Vitals:    06/10/21 1318   BP: 110/68   Pulse: 57   Temp: 97.2 °F (36.2 °C)   SpO2: 98%         06/10/21  1318   Weight: 95.9 kg (211 lb 6.4 oz)     [unfilled]  Physical Exam  Vitals reviewed.   Constitutional:       Appearance: Normal appearance.   HENT:      Head: Normocephalic and atraumatic.      Right Ear: Tympanic membrane normal.       Left Ear: Tympanic membrane normal.      Nose: Nose normal.      Mouth/Throat:      Mouth: Mucous membranes are moist.      Pharynx: Oropharynx is clear.   Eyes:      Conjunctiva/sclera: Conjunctivae normal.      Pupils: Pupils are equal, round, and reactive to light.   Cardiovascular:      Rate and Rhythm: Normal rate and regular rhythm.      Pulses: Normal pulses.      Heart sounds: Normal heart sounds.   Pulmonary:      Effort: Pulmonary effort is normal.      Breath sounds: Normal breath sounds.   Abdominal:      General: Abdomen is flat. Bowel sounds are normal.      Palpations: Abdomen is soft.   Musculoskeletal:         General: Normal range of motion.      Cervical back: Normal range of motion and neck supple.   Skin:     General: Skin is warm and dry.   Neurological:      General: No focal deficit present.      Mental Status: He is alert and oriented to person, place, and time.   Psychiatric:         Mood and Affect: Mood normal.         Behavior: Behavior normal.        PFTs: 11/15/19 (independently reviewed and interpreted by me)  Ratio 61  FVC 2.37/ 50%  FEV1 1.43/ 41%  DLCO 17.62/ 52%  Severe obstruction with no significant bronchodilator response.  Moderately reduced diffusing capacity.  No comparative data available.         CT chest 4/2021     FINDINGS: No mediastinal or hilar lymphadenopathy. Plaque is  present in the thoracic aorta and coronary arteries. No thoracic  aortic aneurysm. Status post CABG. There are calcified left hilar  nodes and granulomas in the left lower lobe. No significant  change noncalcified 4 mm nodule in right middle lobe and 4.7 mm  nodule in the right lower lobe. No new parenchymal nodules. No  consolidation pneumothorax or pleural effusion. Mild atelectasis  is present. Tracheobronchial tree is patent.     Status post cholecystectomy. The imaged upper abdomen is  otherwise unremarkable.     Thoracic spondylosis is present.     IMPRESSION:  1. No significant interval change  in noncalcified nodules in the  right middle and right lower lobes.      Assessment/Plan     Diagnoses and all orders for this visit:    1. Stage 3 severe COPD by GOLD classification (CMS/[facility]) (Primary)    2. Vapes nicotine containing substance    Other orders  -     umeclidinium-vilanterol (ANORO ELLIPTA) 62.5-25 MCG/INH aerosol powder  inhaler; Inhale 1 puff Daily.  Dispense: 1 each; Refill: 11         Discussion/ Recommendations:   Patient stable from a pulmonary standpoint. Continue on anoro with prn albuterol Follow up in a year or sooner if needed          No follow-ups on file.          This document has been electronically signed by Rochelle Alvarado DO on Nakita 10, 2021 13:48 CDT

## 2021-08-16 ENCOUNTER — OFFICE VISIT (OUTPATIENT)
Dept: CARDIOLOGY | Facility: CLINIC | Age: 71
End: 2021-08-16

## 2021-08-16 VITALS
DIASTOLIC BLOOD PRESSURE: 64 MMHG | SYSTOLIC BLOOD PRESSURE: 118 MMHG | BODY MASS INDEX: 28.84 KG/M2 | HEIGHT: 71 IN | OXYGEN SATURATION: 99 % | TEMPERATURE: 96.9 F | WEIGHT: 206 LBS | HEART RATE: 60 BPM

## 2021-08-16 DIAGNOSIS — I27.29 PULMONARY HYPERTENSIVE VENOUS DISEASE (HCC): Primary | ICD-10-CM

## 2021-08-16 PROCEDURE — 99213 OFFICE O/P EST LOW 20 MIN: CPT | Performed by: INTERNAL MEDICINE

## 2021-08-16 PROCEDURE — 93000 ELECTROCARDIOGRAM COMPLETE: CPT | Performed by: INTERNAL MEDICINE

## 2021-08-16 RX ORDER — ASPIRIN 325 MG
325 TABLET ORAL DAILY
COMMUNITY
End: 2022-10-13

## 2021-08-16 NOTE — PROGRESS NOTES
Conrado Cazares  71 y.o. male    08/16/2021  Chief Complaint   Patient presents with   Coronary disease status post CABG 2007        History of Present Illness  Patient history of bypass surgery in 2007, elevated pulmonary pressures on echo    -        SUBJECTIVE  Patient with a history of bypass surgery in 2007.  2028 heart catheterization refill 2 of the grafts being patent.  He also had a echo which show mildly elevated pulmonary artery pressures.  He has COPD.  He says he feels good he has had no chest pain no chest pressure and or shortness of air.  Allergies   Allergen Reactions   • Dopamine Dizziness     Dropped BP          Past Medical History:   Diagnosis Date   • Anxiety    • Class 1 obesity due to excess calories with serious comorbidity and body mass index (BMI) of 31.0 to 31.9 in adult 9/6/2019   • COPD (chronic obstructive pulmonary disease) (CMS/[facility])    • Dehydration    • Depression    • Dyslipidemia    • Emphysema of lung (CMS/HCC)    • Essential (primary) hypertension    • Essential hypertension    • GERD (gastroesophageal reflux disease)    • Heat exhaustion    • Hyperlipidemia    • Neuropathy     bilateral legs    • Nicotine dependence      other tobacco product, uncomplicated - vapor cig      • Other atherosclerosis of native arteries of extremities, bilateral legs (CMS/[facility])     with intermittent claudication   • Renal failure     stage 3   • Sleep apnea    • Tobacco dependence syndrome          Past Surgical History:   Procedure Laterality Date   • CARDIAC CATHETERIZATION     • CARDIAC CATHETERIZATION N/A 2/4/2020    Procedure: Left Heart Cath/PCI if indicated;  Surgeon: Cierra Franklin MD;  Location: [facility] CATH INVASIVE LOCATION;  Service: Cardiology;  Laterality: N/A;   • CORONARY ARTERY BYPASS GRAFT     • GALLBLADDER SURGERY           Family History   Problem Relation Age of Onset   • Heart disease Other          Social History     Socioeconomic History   • Marital status:       Spouse name: Not on file   • Number of children: Not on file   • Years of education: Not on file   • Highest education level: Not on file   Tobacco Use   • Smoking status: Current Every Day Smoker     Types: Electronic Cigarette   • Smokeless tobacco: Never Used   Substance and Sexual Activity   • Alcohol use: No   • Drug use: No   • Sexual activity: Defer         Prior to Admission medications    Medication Sig Start Date End Date Taking? Authorizing Provider   albuterol sulfate  (90 Base) MCG/ACT inhaler Inhale 2 puffs Every 4 (Four) Hours As Needed for Wheezing or Shortness of Air. 9/23/20  Yes Yolie Cazares MD   aspirin 325 MG tablet Take 325 mg by mouth Daily.   Yes Todd Alexander MD   atorvastatin (LIPITOR) 20 MG tablet TAKE 1 TABLET BY MOUTH ONCE DAILY AT NIGHT 6/7/21  Yes Kindra Son MD   bumetanide (BUMEX) 1 MG tablet Take 1 mg by mouth Daily.   Yes Todd Alexander MD   Cholecalciferol (VITAMIN D3) 1000 units capsule Take 1,000 Units by mouth Daily.   Yes Todd Alexander MD   cilostazol (PLETAL) 100 MG tablet TAKE ONE TABLET BY MOUTH TWICE DAILY  Patient taking differently: Take 100 mg by mouth 2 (Two) Times a Day. 5/7/18  Yes Lesley Barksdale APRN   escitalopram (LEXAPRO) 20 MG tablet Take 10 mg by mouth Daily. 3 tablets by mouth daily   Yes Todd Alexander MD   gabapentin (NEURONTIN) 100 MG capsule Take 100 mg by mouth 3 (Three) Times a Day. 1 am 2 pm   Yes Todd Alexander MD   hydrALAZINE (APRESOLINE) 50 MG tablet  6/10/21  Yes Todd Alexander MD   isosorbide mononitrate (IMDUR) 30 MG 24 hr tablet Take 1 tablet by mouth Every Morning. 1/15/21  Yes Vinay Browning MD PhD   lisinopril (PRINIVIL,ZESTRIL) 20 MG tablet Take 20 mg by mouth Daily.   Yes Todd Alexander MD   metFORMIN (GLUCOPHAGE) 500 MG tablet Take 500 mg by mouth Daily With Breakfast.   Yes Todd Alexander MD   nitroglycerin (NITROSTAT) 0.4 MG SL tablet Place 0.4 mg  "under the tongue Every 5 (Five) Minutes As Needed for Chest Pain. Take no more than 3 doses in 15 minutes.   Yes Todd Alexander MD   omega-3 acid ethyl esters (LOVAZA) 1 G capsule Take 2 g by mouth 2 (Two) Times a Day.   Yes Todd Alexander MD   pantoprazole (PROTONIX) 40 MG EC tablet Take 40 mg by mouth Daily. 10/31/19  Yes Todd Alexander MD   primidone (MYSOLINE) 250 MG tablet Take  by mouth 2 (Two) Times a Day. 7/1/17  Yes Todd Alexander MD   propranolol LA (INDERAL LA) 160 MG 24 hr capsule Take 160 mg by mouth Daily. 7/1/17  Yes Todd Alexander MD   rOPINIRole (REQUIP) 1 MG tablet Take 2 mg by mouth Every Night. 7/1/17  Yes Todd Alexander MD   umeclidinium-vilanterol (ANORO ELLIPTA) 62.5-25 MCG/INH aerosol powder  inhaler Inhale 1 puff Daily. 9/23/20  Yes Yolie Cazares MD   aspirin 81 MG tablet Take 325 mg by mouth Daily.    Vinay Browning MD PhD   hydrALAZINE (APRESOLINE) 25 MG tablet Take 50 mg by mouth 2 (two) times a day.    ProviderTodd MD   metFORMIN (GLUCOPHAGE) 500 MG tablet Take 500 mg by mouth Daily. 12/22/20 4/27/21  Todd Alexander MD   O2 (OXYGEN) Inhale 1 (One) Time. Oxygen at night    ProviderTodd MD   umeclidinium-vilanterol (ANORO ELLIPTA) 62.5-25 MCG/INH aerosol powder  inhaler Inhale 1 puff Daily. 6/10/21   Rochelle Alvarado, DO         OBJECTIVE    /64   Pulse 60   Temp 96.9 °F (36.1 °C)   Ht 180.3 cm (71\")   Wt 93.4 kg (206 lb)   SpO2 99%   BMI 28.73 kg/m²         Review of Systems     Constitutional:  Denies recent weight loss, weight gain, fever or chills, no change in exercise tolerance     HENT:  Denies any hearing loss, epistaxis, hoarseness, or difficulty speaking.     Eyes: Wears eyeglasses or contact lenses     Respiratory:  Denies dyspnea with exertion,no cough, wheezing, or hemoptysis.     Cardiovascular: Negative for palpations, chest pain, orthopnea, PND, peripheral edema, syncope, or " claudication.     Gastrointestinal:  Denies change in bowel habits, dyspepsia, ulcer disease, hematochezia, or melena.     Endocrine: Negative for cold intolerance, heat intolerance, polydipsia, polyphagia and polyuria. Denies any history of weight change, heat/cold intolerance, polydipsia, polyuria     Genitourinary: Negative.      Musculoskeletal: Denies any history of arthritic symptoms or back problems     Skin:  Denies any change in hair or nails, rashes, or skin lesions.     Allergic/Immunologic: Negative.  Negative for environmental allergies, food allergies and immunocompromised state.     Neurological:  Denies any history of recurrent headaches, strokes, TIA, or seizure disorder.     Hematological: Denies any food allergies, seasonal allergies, bleeding disorders, or lymphadenopathy.     Psychiatric/Behavioral: Denies any history of depression, substance abuse, or change in cognitive function.         Physical Exam     Constitutional: Cooperative, alert and oriented, well-developed, well-nourished, in no acute distress.     HENT:   Head: Normocephalic, normal hair patterns, no masses or tenderness.  Ears, Nose, and Throat: No gross abnormalities. No pallor or cyanosis. Dentition good.   Eyes: EOMS intact, PERRL, conjunctivae and lids unremarkable. Fundoscopic exam and visual fields not performed.   Neck: No palpable masses or adenopathy, no thyromegaly, no JVD, carotid pulses are full and equal bilaterally and without  Bruits.     Cardiovascular: Regular rhythm, S1 and S2 normal, no S3 or S4. Apical impulse not displaced. No murmurs, gallops, or rubs detected.     Pulmonary/Chest: Chest: normal symmetry, no tenderness to palpation, normal respiratory excursion, no intercostal retraction, no use of accessory muscles.            Pulmonary: Normal breath sounds. No rales or ronchi.    Abdominal: Abdomen soft, bowel sounds normoactive, no masses, no hepatosplenomegaly, non-tender, no bruits.      Musculoskeletal: No deformities, clubbing, cyanosis, erythema, or edema observed. There are no spinal abnormalities noted. Normal muscle strength and tone. Pulses full and equal in all extremities, no bruits auscultated.     Neurological: No gross motor or sensory deficits noted, affect appropriate, oriented to time, person, place.     Skin: Warm and dry to the touch, no apparent skin lesions or masses noted.     Psychiatric: He has a normal mood and affect. His behavior is normal. Judgment and thought content normal.         Procedures      Lab Results   Component Value Date    WBC 4.79 02/04/2020    HGB 14.5 02/04/2020    HCT 41.7 02/04/2020    MCV 89.5 02/04/2020     02/04/2020     Lab Results   Component Value Date    GLUCOSE 151 (H) 02/04/2020    BUN 20 02/04/2020    CREATININE 1.42 (H) 02/04/2020    EGFRIFNONA 50 (L) 02/04/2020    BCR 14.1 02/04/2020    CO2 27.0 02/04/2020    CALCIUM 9.8 02/04/2020     No results found for: CHOL  No results found for: TRIG  No results found for: HDL  No components found for: LDLCALC  No results found for: LDL  No results found for: HDLLDLRATIO  No components found for: CHOLHDL  Lab Results   Component Value Date    HGBA1C 6.3 12/22/2020     No results found for: TSH, M2DNYCB, A9YLDZZ, THYROIDAB        ASSESSMENT AND PLAN      Diagnoses and all orders for this visit:  1.  Status post CABG 2007-both grafts open in 2020.  He is having no angina.  2.. Pulmonary hypertensive venous disease (CMS/HCC) (Primary)  -     ECG 12 Lead  This is by echo only and is only mildly elevated.      Patient's Body mass index is 28.73 kg/m². indicating that he is overweight (BMI 25-29.9). Obesity-related health conditions include the following: coronary heart disease. Obesity is unchanged. BMI is is above average; no BMI management plan is appropriate. We discussed portion control and increasing exercise..                Kindra Son MD  8/16/2021  10:30 CDT

## 2021-08-19 LAB
QT INTERVAL: 458 MS
QTC INTERVAL: 458 MS

## 2021-09-13 RX ORDER — ATORVASTATIN CALCIUM 20 MG/1
20 TABLET, FILM COATED ORAL NIGHTLY
Qty: 30 TABLET | Refills: 0 | Status: SHIPPED | OUTPATIENT
Start: 2021-09-13 | End: 2021-10-19

## 2021-09-30 ENCOUNTER — OFFICE VISIT (OUTPATIENT)
Dept: CARDIAC SURGERY | Facility: CLINIC | Age: 71
End: 2021-09-30

## 2021-09-30 VITALS
OXYGEN SATURATION: 98 % | HEART RATE: 62 BPM | SYSTOLIC BLOOD PRESSURE: 139 MMHG | DIASTOLIC BLOOD PRESSURE: 60 MMHG | BODY MASS INDEX: 28.7 KG/M2 | HEIGHT: 71 IN | WEIGHT: 205 LBS

## 2021-09-30 DIAGNOSIS — I65.23 CAROTID STENOSIS, ASYMPTOMATIC, BILATERAL: Primary | ICD-10-CM

## 2021-09-30 DIAGNOSIS — E78.2 MIXED HYPERLIPIDEMIA: ICD-10-CM

## 2021-09-30 DIAGNOSIS — I73.9 PVD (PERIPHERAL VASCULAR DISEASE) (HCC): ICD-10-CM

## 2021-09-30 PROCEDURE — 99214 OFFICE O/P EST MOD 30 MIN: CPT | Performed by: NURSE PRACTITIONER

## 2021-10-01 NOTE — PROGRESS NOTES
CVTS Office Progress Note     10/1/2021    Conrado Cazares  1950    Chief Complaint:    Chief Complaint   Patient presents with   • Carotid Artery Disease       HPI:      PCP:  Deepa Servin APRN  Cardiology:  Dr. Browning  Nephrology:  Dr. Deras  Pulmonary: Dr. Cazares    71 y.o. male with HTN(stable, increased risk stroke, rupture), Hyperlipidemia(stable, increased risk cardiovascular events), Obesity(uncontrolled, increased risk cardiovascular events), Smoker(uncontrolled, increased risk cardiovascular events), COPD(stable, increased risk pulmonary complications) and Chronic Kidney Disease(stable, increased risk renal failure) CAD (prior CABG), PVD (chronic, asymptomatic), Carotid stenosis (new, increased risk of CVA).  former smoker and now uses e-cigarette.  Established with [facility] 2014 for claudication, repeat studies post medical management improved, no prior intervention, no current claudication.  Recent LHC grafts patent.  Serial CT scans for lung cancer screening followed by Dr. Cazares, no longer established.  Reports today for ASHWIN and carotid ultrasound.  No other associated signs, symptoms or modifying factors.    2007 CABG [facility], Dr. Sheikh     06/2014 ASHWIN: 0.66 tri/bi of the RIGHT, and 0.56 tri/bi of the LEFT  12/21/2015 ASHWIN:  RIGHT .80 triphasic.  LEFT .88 triphasic.  6/27/2016 ASHWIN:  RIGHT .88 triphasic.  LEFT .79 triphasic.  01/03/17   ASHWIN:  RIGHT .82  triphasic.  LEFT .70  Triphasic.  07/06/17  ASHWIN:  RIGHT .86  triphasic.  LEFT .70  Triphasic.  01/18/18   ASHWIN:  RIGHT 1.01  Triphasic.  LEFT .88  Triphasic.  07/19/18   ASHWIN:  RIGHT 0.96  Triphasic.  LEFT .79  Triphasic.  01/24/19 ASHWIN: RIGHT 1.08 Triphasic. LEFT 0.78 Biphasic from femoral to distal  8/1/2019 ASHWIN: Right 0.91 triphasic.  Left 0.80 triphasic.  8/2020 ASHWIN: Right 0.79 triphasic.  Left 0.70 triphasic pop, biphasic distally.   3/2021 ASHWIN: Right 0.91 triphasic.  Left 0.86 triphasic.    8/2020 Carotid Duplex: DIAZ 50-69% mPSV  176c/s Ratio 2.2, LICA 0-49% mPSV 87c/s Ratio 1.5, Antegrade vertebrals  3/2021 Carotid Duplex: DIAZ 50-69% mPSV 176c/s Ratio 1.2, LICA 50-69% mPSV 145c/s Ratio 2.5, Antegrade vertebrals  9/2021 Carotid Duplex: DIAZ 50-69% mPSV 145c/s Ratio 2.7, LICA 50-69% mPSV 150c/s Ratio 2.1, Antegrade vertebrals      The following portions of the patient's history were reviewed and updated as appropriate: allergies, current medications, past family history, past medical history, past social history, past surgical history and problem list.  Recent images independently reviewed.  Available laboratory values reviewed.    PMH:  Past Medical History:   Diagnosis Date   • Anxiety    • Class 1 obesity due to excess calories with serious comorbidity and body mass index (BMI) of 31.0 to 31.9 in adult 9/6/2019   • COPD (chronic obstructive pulmonary disease) (CMS/[facility])    • Dehydration    • Depression    • Dyslipidemia    • Emphysema of lung (CMS/[facility])    • Essential (primary) hypertension    • Essential hypertension    • GERD (gastroesophageal reflux disease)    • Heat exhaustion    • Hyperlipidemia    • Neuropathy     bilateral legs    • Nicotine dependence      other tobacco product, uncomplicated - vapor cig      • Other atherosclerosis of native arteries of extremities, bilateral legs (CMS/[facility])     with intermittent claudication   • Renal failure     stage 3   • Sleep apnea    • Tobacco dependence syndrome      Past Surgical History:   Procedure Laterality Date   • CARDIAC CATHETERIZATION     • CARDIAC CATHETERIZATION N/A 2/4/2020    Procedure: Left Heart Cath/PCI if indicated;  Surgeon: Cierra Franklin MD;  Location: [facility] INVASIVE LOCATION;  Service: Cardiology;  Laterality: N/A;   • CORONARY ARTERY BYPASS GRAFT     • GALLBLADDER SURGERY       Family History   Problem Relation Age of Onset   • Heart disease Other      Social History     Tobacco Use   • Smoking status: Current Every Day Smoker     Types: Electronic Cigarette   •  Smokeless tobacco: Never Used   Substance Use Topics   • Alcohol use: No   • Drug use: No       ALLERGIES:  Allergies   Allergen Reactions   • Dopamine Dizziness     Dropped BP          MEDICATIONS:    Current Outpatient Medications:   •  albuterol sulfate  (90 Base) MCG/ACT inhaler, Inhale 2 puffs Every 4 (Four) Hours As Needed for Wheezing or Shortness of Air., Disp: 18 g, Rfl: 11  •  aspirin 325 MG tablet, Take 325 mg by mouth Daily., Disp: , Rfl:   •  atorvastatin (LIPITOR) 20 MG tablet, Take 1 tablet by mouth Every Night., Disp: 30 tablet, Rfl: 0  •  bumetanide (BUMEX) 1 MG tablet, Take 1 mg by mouth Daily., Disp: , Rfl:   •  Cholecalciferol (VITAMIN D3) 1000 units capsule, Take 1,000 Units by mouth Daily., Disp: , Rfl:   •  cilostazol (PLETAL) 100 MG tablet, TAKE ONE TABLET BY MOUTH TWICE DAILY (Patient taking differently: Take 100 mg by mouth 2 (Two) Times a Day.), Disp: 60 tablet, Rfl: 5  •  escitalopram (LEXAPRO) 20 MG tablet, Take 10 mg by mouth Daily. 3 tablets by mouth daily, Disp: , Rfl:   •  gabapentin (NEURONTIN) 100 MG capsule, Take 100 mg by mouth 3 (Three) Times a Day. 1 am 2 pm, Disp: , Rfl:   •  hydrALAZINE (APRESOLINE) 50 MG tablet, Take 50 mg by mouth 2 (two) times a day., Disp: , Rfl:   •  isosorbide mononitrate (IMDUR) 30 MG 24 hr tablet, Take 1 tablet by mouth Every Morning., Disp: 90 tablet, Rfl: 3  •  lisinopril (PRINIVIL,ZESTRIL) 20 MG tablet, Take 20 mg by mouth Daily., Disp: , Rfl:   •  metFORMIN (GLUCOPHAGE) 500 MG tablet, Take 500 mg by mouth Daily With Breakfast., Disp: , Rfl:   •  nitroglycerin (NITROSTAT) 0.4 MG SL tablet, Place 0.4 mg under the tongue Every 5 (Five) Minutes As Needed for Chest Pain. Take no more than 3 doses in 15 minutes., Disp: , Rfl:   •  omega-3 acid ethyl esters (LOVAZA) 1 G capsule, Take 2 g by mouth 2 (Two) Times a Day., Disp: , Rfl:   •  pantoprazole (PROTONIX) 40 MG EC tablet, Take 40 mg by mouth Daily., Disp: , Rfl:   •  primidone (MYSOLINE) 250  MG tablet, Take  by mouth 2 (Two) Times a Day., Disp: , Rfl:   •  propranolol LA (INDERAL LA) 160 MG 24 hr capsule, Take 160 mg by mouth Daily., Disp: , Rfl:   •  rOPINIRole (REQUIP) 1 MG tablet, Take 2 mg by mouth Every Night., Disp: , Rfl:   •  umeclidinium-vilanterol (ANORO ELLIPTA) 62.5-25 MCG/INH aerosol powder  inhaler, Inhale 1 puff Daily., Disp: 1 each, Rfl: 11  •  metFORMIN (GLUCOPHAGE) 500 MG tablet, Take 500 mg by mouth Daily., Disp: , Rfl:   •  O2 (OXYGEN), Inhale 1 (One) Time. Oxygen at night, Disp: , Rfl:       Review of Systems   Constitutional: Negative for chills, decreased appetite, fever and weight loss.   HENT: Negative for congestion, nosebleeds and sore throat.    Eyes: Negative for blurred vision, visual disturbance and visual halos.   Cardiovascular: Negative for chest pain, claudication, dyspnea on exertion and leg swelling.   Respiratory: Negative for cough, shortness of breath, sputum production and wheezing.    Endocrine: Negative for cold intolerance and polyuria.   Hematologic/Lymphatic: Negative for bleeding problem. Bruises/bleeds easily.        Does not report S/S of adverse bleeding event including nose bleeds, hematuria, melena, gingival bleeding, or hematemesis.   Skin: Positive for unusual hair distribution. Negative for flushing and nail changes.   Musculoskeletal: Positive for arthritis, back pain and joint pain.   Gastrointestinal: Negative for bloating, abdominal pain, hematemesis, melena, nausea and vomiting.   Genitourinary: Negative for flank pain and hematuria.   Neurological: Negative for brief paralysis, difficulty with concentration, focal weakness, light-headedness, loss of balance, numbness, paresthesias and weakness.        No amaurosis fugax, TIA, or CVA.     Psychiatric/Behavioral: Negative for altered mental status, depression, substance abuse and suicidal ideas.   Allergic/Immunologic: Negative for hives and persistent infections.         Vitals:    09/30/21  "0955   BP: 139/60   BP Location: Left arm   Patient Position: Sitting   Cuff Size: Adult   Pulse: 62   SpO2: 98%   Weight: 93 kg (205 lb)   Height: 180.3 cm (71\")     Physical Exam   Constitutional: He is oriented to person, place, and time. He appears well-developed.   HENT:   Head: Normocephalic and atraumatic.   Eyes: Pupils are equal, round, and reactive to light. Conjunctivae are normal.   Cardiovascular: Normal rate.   No murmur heard.  Pulmonary/Chest: Effort normal and breath sounds normal. No respiratory distress.   Abdominal: Soft. Bowel sounds are normal. He exhibits no distension.   Musculoskeletal: Normal range of motion. No tenderness.   Neurological: He is alert and oriented to person, place, and time. No cranial nerve deficit.   Skin: Skin is warm and dry. Capillary refill takes 2 to 3 seconds.   There is no evidence of skin breakdown of the bilateral lower extremities.  BLE pink, no evidence of ischemia.  Feet are absent of dependent rubor.   Psychiatric: Judgment normal.   Nursing note and vitals reviewed.      Assessment & Plan     Independent Review of Studies  9/2021 Carotid Duplex: DIAZ 50-69% mPSV 145c/s Ratio 2.7, LICA 50-69% mPSV 150c/s Ratio 2.1, Antegrade vertebrals    1. Mixed hyperlipidemia  Lipid-lowering therapy has been proven beneficial in patients with cardio-vascular disease. Current guidelines recommend statin treatment for all patients with PAD,CAD and carotid stenosis. Statins are beneficial in preventing cardiovascular events, increasing functional capacity and lower the risk of adverse limb loss in PAD. Statins decrease the progression of plaque formation and may improve peripheral vessel lining, and aid in reversing atherosclerosis.    2. PVD (peripheral vascular disease) (CMS/HCC)  Moderate reduction in ratio bilaterally.  Asymptomatic.  LLE waveforms consistent with distal disease.   Check feet daily.      Repeat ASHWIN in 6 months    ASA, Statin, Pletal, BB, ACE    3. Carotid " stenosis, asymptomatic, bilateral  Moderate R ICA disease, asymptomatic.    Repeat duplex in 6 months  Medical management as above    - Duplex Carotid Ultrasound CAR; Future    4. Lung cancer screening  Due 4/2022    Detailed discussion regarding risks, benefits, and treatment plan. Images independently reviewed. Patient understands, agrees, and wishes to proceed with plan.       This document has been electronically signed by YESICA Belcher  On October 1, 2021 13:22 CDT      EMR Dragon/Transcription disclaimer:   Much of this encounter note is an electronic transcription/translation of spoken language to printed text. The electronic translation of spoken language may permit erroneous, or at times, nonsensical words or phrases to be inadvertently transcribed; Although I have reviewed the note for such errors, some may still exist.

## 2021-10-19 RX ORDER — ATORVASTATIN CALCIUM 20 MG/1
TABLET, FILM COATED ORAL
Qty: 30 TABLET | Refills: 11 | Status: SHIPPED | OUTPATIENT
Start: 2021-10-19 | End: 2022-12-01 | Stop reason: SDUPTHER

## 2021-11-02 RX ORDER — ATORVASTATIN CALCIUM 20 MG/1
TABLET, FILM COATED ORAL
Qty: 30 TABLET | Refills: 0 | OUTPATIENT
Start: 2021-11-02

## 2022-01-19 RX ORDER — ISOSORBIDE MONONITRATE 30 MG/1
TABLET, EXTENDED RELEASE ORAL
Qty: 90 TABLET | Refills: 3 | Status: SHIPPED | OUTPATIENT
Start: 2022-01-19

## 2022-05-13 ENCOUNTER — OFFICE VISIT (OUTPATIENT)
Dept: CARDIAC SURGERY | Facility: CLINIC | Age: 72
End: 2022-05-13

## 2022-05-13 VITALS
BODY MASS INDEX: 25.34 KG/M2 | HEART RATE: 70 BPM | DIASTOLIC BLOOD PRESSURE: 79 MMHG | OXYGEN SATURATION: 98 % | HEIGHT: 71 IN | SYSTOLIC BLOOD PRESSURE: 154 MMHG | WEIGHT: 181 LBS

## 2022-05-13 DIAGNOSIS — I65.23 CAROTID STENOSIS, ASYMPTOMATIC, BILATERAL: ICD-10-CM

## 2022-05-13 DIAGNOSIS — I73.9 PVD (PERIPHERAL VASCULAR DISEASE) WITH CLAUDICATION: ICD-10-CM

## 2022-05-13 DIAGNOSIS — F17.210 TOBACCO DEPENDENCE DUE TO CIGARETTES: ICD-10-CM

## 2022-05-13 DIAGNOSIS — R60.0 BILATERAL LOWER EXTREMITY EDEMA: ICD-10-CM

## 2022-05-13 DIAGNOSIS — Z12.2 ENCOUNTER FOR SCREENING FOR LUNG CANCER: ICD-10-CM

## 2022-05-13 DIAGNOSIS — F17.200 SMOKER: Primary | ICD-10-CM

## 2022-05-13 PROCEDURE — 99214 OFFICE O/P EST MOD 30 MIN: CPT | Performed by: NURSE PRACTITIONER

## 2022-05-13 RX ORDER — PENTOXIFYLLINE 400 MG/1
400 TABLET, EXTENDED RELEASE ORAL 2 TIMES DAILY
Qty: 60 TABLET | Refills: 5 | Status: SHIPPED | OUTPATIENT
Start: 2022-05-13 | End: 2022-10-13

## 2022-05-13 NOTE — PATIENT INSTRUCTIONS
The results of your vascular studies of your right leg shows mild disease with good  circulation, in the left leg shows milddisease with good  circulation.      If signs and symptoms of ischemia should occur including but not limited to pale/blue discoloration of limb, increasing pain with ambulation or at rest, or a non-healing wound. Patient is to notify Heart and Vascular center for immediate evaluation.    The results of your carotid ultrasound shows moderate disease of the right carotid and is stable from previous exam, ultrasound of the left carotid shows moderate disease and is stable from previous exam.    Follow up in 12 months    If you should experience any neurological symptoms including but not limited to visual or speech disturbances confusion, seizures, or weakness of limbs of one side of your body notify Heart and Vascular center immediately for evaluation or if after hours present to the nearest Emergency Department.     Bilateral venous next visit in 6 months

## 2022-05-18 ENCOUNTER — HOSPITAL ENCOUNTER (OUTPATIENT)
Dept: CT IMAGING | Facility: HOSPITAL | Age: 72
Discharge: HOME | End: 2022-05-18
Admitting: NURSE PRACTITIONER

## 2022-05-18 DIAGNOSIS — F17.200 SMOKER: ICD-10-CM

## 2022-05-18 DIAGNOSIS — F17.210 TOBACCO DEPENDENCE DUE TO CIGARETTES: ICD-10-CM

## 2022-05-18 PROCEDURE — 71271 CT THORAX LUNG CANCER SCR C-: CPT

## 2022-05-18 NOTE — PROGRESS NOTES
CVTS Office Progress Note     5/18/2022    Conrado Cazares  1950    Chief Complaint:    Chief Complaint   Patient presents with   • Peripheral Vascular Disease       HPI:      PCP:  Deepa Servin APRN  Cardiology:  Dr. Browning  Nephrology:  Dr. Deras  Pulmonary: Dr. Cazares    71 y.o. male with HTN(stable, increased risk stroke, rupture), Hyperlipidemia(stable, increased risk cardiovascular events), Obesity(uncontrolled, increased risk cardiovascular events), Smoker(uncontrolled, increased risk cardiovascular events), COPD(stable, increased risk pulmonary complications) and Chronic Kidney Disease(stable, increased risk renal failure) CAD (prior CABG), PVD (chronic, asymptomatic), Carotid stenosis (new, increased risk of CVA).  former smoker and now uses e-cigarette.  Established with [facility] 2014 for claudication, repeat studies post medical management improved, no prior intervention, no current claudication.  Recent LHC grafts patent.  Serial CT scans for lung cancer screening followed by Dr. Cazares, no longer established.  Reports today for ASHWIN and carotid ultrasound.  No other associated signs, symptoms or modifying factors.    2007 CABG [facility], Dr. Sheikh     06/2014 A[facility]WIN: 0.66 tri/bi of the RIGHT, and 0.56 tri/bi of the LEFT  12/21/2015 [facility]WIN:  RIGHT .80 triphasic.  LEFT .88 triphasic.  6/27/2016 [facility]WIN:  RIGHT .88 triphasic.  LEFT .79 triphasic.  01/03/17   [facility]:  RIGHT .82  triphasic.  LEFT .70  Triphasic.  07/06/17  [facility]:  RIGHT .86  triphasic.  LEFT .70  Triphasic.  01/18/18   [facility]:  RIGHT 1.01  Triphasic.  LEFT .88  Triphasic.  07/19/18   [facility]:  RIGHT 0.96  Triphasic.  LEFT .79  Triphasic.  01/24/19 [facility]WIN: RIGHT 1.08 Triphasic. LEFT 0.78 Biphasic from femoral to distal  8/1/2019 [facility]WIN: Right 0.91 triphasic.  Left 0.80 triphasic.  8/2020 [facility]WIN: Right 0.79 triphasic.  Left 0.70 triphasic pop, biphasic distally.   3/2021 [facility]WIN: Right 0.91 triphasic.  Left 0.86 triphasic.  5/2022 [facility]WIN: Right 0.75 triphasic.   Left 0.69 triphasic pop, biphasic distally.     8/2020 Carotid Duplex: DIAZ 50-69% mPSV 176c/s Ratio 2.2, LICA 0-49% mPSV 87c/s Ratio 1.5, Antegrade vertebrals  3/2021 Carotid Duplex: DIAZ 50-69% mPSV 176c/s Ratio 1.2, LICA 50-69% mPSV 145c/s Ratio 2.5, Antegrade vertebrals  9/2021 Carotid Duplex: DIAZ 50-69% mPSV 145c/s Ratio 2.7, LICA 50-69% mPSV 150c/s Ratio 2.1, Antegrade vertebrals  5/2022 Carotid Duplex: DIAZ 50-69% mPSV 136c/s Ratio 1.8, LICA 50-69% mPSV 136c/s Ratio 2.6, Antegrade vertebrals      The following portions of the patient's history were reviewed and updated as appropriate: allergies, current medications, past family history, past medical history, past social history, past surgical history and problem list.  Recent images independently reviewed.  Available laboratory values reviewed.    PMH:  Past Medical History:   Diagnosis Date   • Anxiety    • Class 1 obesity due to excess calories with serious comorbidity and body mass index (BMI) of 31.0 to 31.9 in adult 9/6/2019   • COPD (chronic obstructive pulmonary disease) (HCC)    • Dehydration    • Depression    • Dyslipidemia    • Emphysema of lung (HCC)    • Essential (primary) hypertension    • Essential hypertension    • GERD (gastroesophageal reflux disease)    • Heat exhaustion    • Hyperlipidemia    • Neuropathy     bilateral legs    • Nicotine dependence      other tobacco product, uncomplicated - vapor cig      • Other atherosclerosis of native arteries of extremities, bilateral legs (HCC)     with intermittent claudication   • Renal failure     stage 3   • Sleep apnea    • Tobacco dependence syndrome      Past Surgical History:   Procedure Laterality Date   • CARDIAC CATHETERIZATION     • CARDIAC CATHETERIZATION N/A 2/4/2020    Procedure: Left Heart Cath/PCI if indicated;  Surgeon: Cierra Franklin MD;  Location: [facility] CATH INVASIVE LOC[facility];  Service: Cardiology;  Laterality: N/A;   • CORONARY ARTERY BYPASS GRAFT     • GALLBLADDER SURGERY        Family History   Problem Relation Age of Onset   • Heart disease Other      Social History     Tobacco Use   • Smoking status: Current Every Day Smoker     Types: Electronic Cigarette   • Smokeless tobacco: Never Used   Substance Use Topics   • Alcohol use: No   • Drug use: No       ALLERGIES:  Allergies   Allergen Reactions   • Dopamine Dizziness     Dropped BP          MEDICATIONS:    Current Outpatient Medications:   •  albuterol sulfate  (90 Base) MCG/ACT inhaler, Inhale 2 puffs Every 4 (Four) Hours As Needed for Wheezing or Shortness of Air., Disp: 18 g, Rfl: 11  •  aspirin 325 MG tablet, Take 325 mg by mouth Daily., Disp: , Rfl:   •  atorvastatin (LIPITOR) 20 MG tablet, TAKE 1 TABLET BY MOUTH ONCE DAILY AT NIGHT, Disp: 30 tablet, Rfl: 11  •  bumetanide (BUMEX) 1 MG tablet, Take 1 mg by mouth Daily., Disp: , Rfl:   •  Cholecalciferol (VITAMIN D3) 1000 units capsule, Take 1,000 Units by mouth Daily., Disp: , Rfl:   •  escitalopram (LEXAPRO) 20 MG tablet, Take 10 mg by mouth Daily. 3 tablets by mouth daily, Disp: , Rfl:   •  gabapentin (NEURONTIN) 100 MG capsule, Take 100 mg by mouth 3 (Three) Times a Day. 1 am 2 pm, Disp: , Rfl:   •  hydrALAZINE (APRESOLINE) 50 MG tablet, Take 50 mg by mouth 2 (two) times a day., Disp: , Rfl:   •  isosorbide mononitrate (IMDUR) 30 MG 24 hr tablet, TAKE 1 TABLET BY MOUTH ONCE DAILY IN THE MORNING, Disp: 90 tablet, Rfl: 3  •  lisinopril (PRINIVIL,ZESTRIL) 20 MG tablet, Take 20 mg by mouth Daily., Disp: , Rfl:   •  metFORMIN (GLUCOPHAGE) 500 MG tablet, Take 500 mg by mouth Daily With Breakfast., Disp: , Rfl:   •  nitroglycerin (NITROSTAT) 0.4 MG SL tablet, Place 0.4 mg under the tongue Every 5 (Five) Minutes As Needed for Chest Pain. Take no more than 3 doses in 15 minutes., Disp: , Rfl:   •  O2 (OXYGEN), Inhale 1 (One) Time. Oxygen at night, Disp: , Rfl:   •  omega-3 acid ethyl esters (LOVAZA) 1 g capsule, Take 2 g by mouth 2 (Two) Times a Day., Disp: , Rfl:   •   pantoprazole (PROTONIX) 40 MG EC tablet, Take 40 mg by mouth Daily., Disp: , Rfl:   •  primidone (MYSOLINE) 250 MG tablet, Take  by mouth 2 (Two) Times a Day., Disp: , Rfl:   •  propranolol LA (INDERAL LA) 160 MG 24 hr capsule, Take 160 mg by mouth Daily., Disp: , Rfl:   •  rOPINIRole (REQUIP) 1 MG tablet, Take 2 mg by mouth Every Night., Disp: , Rfl:   •  umeclidinium-vilanterol (ANORO ELLIPTA) 62.5-25 MCG/INH aerosol powder  inhaler, Inhale 1 puff Daily., Disp: 1 each, Rfl: 11  •  metFORMIN (GLUCOPHAGE) 500 MG tablet, Take 500 mg by mouth Daily., Disp: , Rfl:   •  pentoxifylline (TRENtal) 400 MG CR tablet, Take 1 tablet by mouth 2 (Two) Times a Day., Disp: 60 tablet, Rfl: 5      Review of Systems   Constitutional: Negative for chills, decreased appetite, fever and weight loss.   HENT: Negative for congestion, nosebleeds and sore throat.    Eyes: Negative for blurred vision, visual disturbance and visual halos.   Cardiovascular: Negative for chest pain, claudication, dyspnea on exertion and leg swelling.   Respiratory: Negative for cough, shortness of breath, sputum production and wheezing.    Endocrine: Negative for cold intolerance and polyuria.   Hematologic/Lymphatic: Negative for bleeding problem. Bruises/bleeds easily.        Does not report S/S of adverse bleeding event including nose bleeds, hematuria, melena, gingival bleeding, or hematemesis.   Skin: Positive for unusual hair distribution. Negative for flushing and nail changes.   Musculoskeletal: Positive for arthritis, back pain and joint pain.   Gastrointestinal: Negative for bloating, abdominal pain, hematemesis, melena, nausea and vomiting.   Genitourinary: Negative for flank pain and hematuria.   Neurological: Negative for brief paralysis, difficulty with concentration, focal weakness, light-headedness, loss of balance, numbness, paresthesias and weakness.        No amaurosis fugax, TIA, or CVA.     Psychiatric/Behavioral: Negative for altered  "mental status, depression, substance abuse and suicidal ideas.   Allergic/Immunologic: Negative for hives and persistent infections.         Vitals:    05/13/22 1013   BP: 154/79   BP Location: Left arm   Patient Position: Sitting   Cuff Size: Adult   Pulse: 70   SpO2: 98%   Weight: 82.1 kg (181 lb)   Height: 180.3 cm (71\")     Physical Exam   Constitutional: He is oriented to person, place, and time. He appears well-developed.   HENT:   Head: Normocephalic and atraumatic.   Eyes: Pupils are equal, round, and reactive to light. Conjunctivae are normal.   Cardiovascular: Normal rate.   No murmur heard.  Pulmonary/Chest: Effort normal and breath sounds normal. No respiratory distress.   Abdominal: Soft. Bowel sounds are normal. He exhibits no distension.   Musculoskeletal: Normal range of motion. No tenderness.   Neurological: He is alert and oriented to person, place, and time. No cranial nerve deficit.   Skin: Skin is warm and dry. Capillary refill takes 2 to 3 seconds.   There is no evidence of skin breakdown of the bilateral lower extremities.  BLE pink, no evidence of ischemia.  Feet are absent of dependent rubor.   Psychiatric: Judgment normal.   Nursing note and vitals reviewed.      Assessment & Plan     Independent Review of Studies  5/2022 Carotid Duplex: DIAZ 50-69% mPSV 136c/s Ratio 1.8, LICA 50-69% mPSV 136c/s Ratio 2.6, Antegrade vertebrals  5/2022 ASHWIN: Right 0.75 triphasic.  Left 0.69 triphasic pop, biphasic distally.     1. Smoker  Smoking cessation assistance options offered including behavioral counseling (Smoking Cessation Classes), Nicotine replacement therapy (patches or gum), pharmacologic therapy (Chantix, Wellbutrin). Understands tobacco increases risk of expanding AAA, MI, CVA, PAD, carcinoma. Discussion and question answer period 5-7 minutes.    - CT Chest Low Dose Wo; Future    3. Bilateral lower extremity edema  Worsening BLE edema.  Obtain bilateral venous to rule out treatable GSV reflux. "      Recommend continued use of compression stockings 20-30mmHg daily, may remove at night.  Advised elevation of legs while at rest.  Encouraged daily exercise.     Add trental for venous stasis dermatitis    - Duplex Venous Lower Extremity - Bilateral CAR; Future    4. Encounter for screening for lung cancer  >30 pack year history, due April 2022.  Will schedule    5. PVD (peripheral vascular disease) with claudication (HCC)  Mild to moderate reduction in lower extremity perfusion without lifestyle limiting claudication    Remains on ASA, Statin, ACE,  BB    6. Carotid stenosis, asymptomatic, bilateral  Moderate bilateral ICA disease  Asymptomatic disease    Repeat duplex in on year    ASA, Statin        Detailed discussion regarding risks, benefits, and treatment plan. Images independently reviewed. Patient understands, agrees, and wishes to proceed with plan.       This document has been electronically signed by JUANA Belcher-BC @  On May 18, 2022 10:49 CDT

## 2022-06-01 NOTE — PATIENT INSTRUCTIONS
The results of your carotid ultrasound shows moderate disease of the right carotid and is stable from previous exam, ultrasound of the left carotid shows moderate disease and is stable from previous exam.    Follow up in 6 months    If you should experience any neurological symptoms including but not limited to visual or speech disturbances confusion, seizures, or weakness of limbs of one side of your body notify [facility] V[facility] center immediately for evaluation or if after hours present to the nearest Emergency Department.     Scheduling lung cancer screening with CT, will call with results.    97.2

## 2022-08-29 ENCOUNTER — OFFICE VISIT (OUTPATIENT)
Dept: CARDIOLOGY | Facility: CLINIC | Age: 72
End: 2022-08-29

## 2022-08-29 VITALS
OXYGEN SATURATION: 98 % | SYSTOLIC BLOOD PRESSURE: 146 MMHG | HEIGHT: 71 IN | BODY MASS INDEX: 29.03 KG/M2 | WEIGHT: 207.4 LBS | HEART RATE: 82 BPM | DIASTOLIC BLOOD PRESSURE: 84 MMHG

## 2022-08-29 DIAGNOSIS — L03.115 BILATERAL CELLULITIS OF LOWER LEG: ICD-10-CM

## 2022-08-29 DIAGNOSIS — L03.116 BILATERAL CELLULITIS OF LOWER LEG: ICD-10-CM

## 2022-08-29 DIAGNOSIS — R60.0 LOWER LEG EDEMA: ICD-10-CM

## 2022-08-29 DIAGNOSIS — I27.29 PULMONARY HYPERTENSIVE VENOUS DISEASE: Primary | ICD-10-CM

## 2022-08-29 LAB
QT INTERVAL: 436 MS
QTC INTERVAL: 509 MS

## 2022-08-29 PROCEDURE — 99215 OFFICE O/P EST HI 40 MIN: CPT | Performed by: NURSE PRACTITIONER

## 2022-08-29 PROCEDURE — 93000 ELECTROCARDIOGRAM COMPLETE: CPT | Performed by: INTERNAL MEDICINE

## 2022-08-29 RX ORDER — SULFAMETHOXAZOLE AND TRIMETHOPRIM 800; 160 MG/1; MG/1
1 TABLET ORAL 2 TIMES DAILY
Qty: 14 TABLET | Refills: 0 | Status: SHIPPED | OUTPATIENT
Start: 2022-08-29 | End: 2022-10-13

## 2022-08-29 RX ORDER — CARVEDILOL 12.5 MG/1
12.5 TABLET ORAL 2 TIMES DAILY
Qty: 180 TABLET | Refills: 3 | Status: SHIPPED | OUTPATIENT
Start: 2022-08-29 | End: 2022-10-13 | Stop reason: SDUPTHER

## 2022-08-29 RX ORDER — CARVEDILOL 6.25 MG/1
6.25 TABLET ORAL 2 TIMES DAILY WITH MEALS
COMMUNITY
Start: 2022-08-19 | End: 2022-08-29 | Stop reason: DRUGHIGH

## 2022-08-29 NOTE — PROGRESS NOTES
Pulmonary hypertensive venous disease       History of Present Illness  Mr. Conrado Cazares is a 72-year-old male with medical history of pulmonary hypertension, hypertension, hyperlipidemia, PVD, COPD stage III, CKD, HALIMA coronary artery disease with CABG.  He also has nonrheumatic mitral valve regurgitation, tricuspid valve regurgitation and pulmonary valve insufficiency.    He was recently admitted to [facility] [place] with urosepsis, acute kidney injury, non-STEMI type II demand ischemia due to hypertension and sepsis.  Found to have Klebsiella in urine and developed hypotension.  An echocardiogram was performed there and showed preserved LV EF but pulmonary hypertension.  Last labs showed creatinine 1.9, EGFR 35.    He presents today for hospital follow-up.  He complains of shortness of breath and bilateral lower leg swelling.  This is gotten worse over the last couple of days.  He has good urinary output and he reports that it is pale yellow in he has increased frequency since Bumex has increased to 1 mg twice daily.  This was started on Friday.  His weight at her office was 206 and today 207.  He has known pulmonary hypertension.  Blood pressure slightly elevated today.      The ASCVD Risk score (Yao DC Jr., et al., 2013) failed to calculate for the following reasons:    Cannot find a previous HDL lab    Cannot find a previous total cholesterol lab    Cardiac Risk Factors:  diabetes mellitus, hypercholesterolemia, hypertension, Sedentary life style and Obesity, former smoker  Previous PCI and History of Heart Failure    Past Medical History:   Diagnosis Date   • Anxiety    • Class 1 obesity due to excess calories with serious comorbidity and body mass index (BMI) of 31.0 to 31.9 in adult 9/6/2019   • COPD (chronic obstructive pulmonary disease) (HCC)    • Dehydration    • Depression    • Dyslipidemia    • Emphysema of lung (HCC)    • Essential (primary) hypertension    • Essential hypertension    • GERD  (gastroesophageal reflux disease)    • Heat exhaustion    • Hyperlipidemia    • Neuropathy     bilateral legs    • Nicotine dependence      other tobacco product, uncomplicated - vapor cig      • Other atherosclerosis of native arteries of extremities, bilateral legs (HCC)     with intermittent claudication   • Renal failure     stage 3   • Sleep apnea    • Tobacco dependence syndrome      Past Surgical History:   Procedure Laterality Date   • CARDIAC CATHETERIZATION     • CARDIAC CATHETERIZATION N/A 2/4/2020    Procedure: Left Heart Cath/PCI if indicated;  Surgeon: Cierra Franklin MD;  Location: [facility] CATH INVASIVE LOCATION;  Service: Cardiology;  Laterality: N/A;   • CORONARY ARTERY BYPASS GRAFT     • GALLBLADDER SURGERY       Social History     Socioeconomic History   • Marital status:    Tobacco Use   • Smoking status: Current Every Day Smoker     Types: Electronic Cigarette   • Smokeless tobacco: Never Used   Substance and Sexual Activity   • Alcohol use: No   • Drug use: No   • Sexual activity: Defer     Family History   Problem Relation Age of Onset   • Heart disease Other        ALLERGIES:  Allergies   Allergen Reactions   • Dopamine Dizziness     Dropped BP          Review of Systems   Constitutional: Positive for malaise/fatigue. Negative for chills, diaphoresis, fever and night sweats.   HENT: Negative for congestion and sore throat.    Cardiovascular: Positive for dyspnea on exertion, leg swelling and orthopnea. Negative for chest pain, near-syncope and palpitations.   Respiratory: Positive for shortness of breath. Negative for cough and wheezing.    Hematologic/Lymphatic: Negative for adenopathy and bleeding problem.   Skin: Positive for rash. Negative for flushing.   Musculoskeletal: Negative for muscle cramps, muscle weakness and myalgias.   Gastrointestinal: Positive for bloating. Negative for nausea and vomiting.   Neurological: Negative for dizziness, light-headedness and loss of balance.        Current Outpatient Medications   Medication Sig Dispense Refill   • albuterol sulfate  (90 Base) MCG/ACT inhaler Inhale 2 puffs Every 4 (Four) Hours As Needed for Wheezing or Shortness of Air. 18 g 11   • aspirin 325 MG tablet Take 325 mg by mouth Daily.     • atorvastatin (LIPITOR) 20 MG tablet TAKE 1 TABLET BY MOUTH ONCE DAILY AT NIGHT 30 tablet 11   • bumetanide (BUMEX) 1 MG tablet Take 1 mg by mouth Daily.     • carvedilol (COREG) 6.25 MG tablet Take 6.25 mg by mouth 2 (Two) Times a Day With Meals.     • Cholecalciferol (VITAMIN D3) 1000 units capsule Take 1,000 Units by mouth Daily.     • escitalopram (LEXAPRO) 20 MG tablet Take 10 mg by mouth Daily. 3 tablets by mouth daily     • gabapentin (NEURONTIN) 100 MG capsule Take 100 mg by mouth 3 (Three) Times a Day. 1 am 2 pm     • hydrALAZINE (APRESOLINE) 50 MG tablet Take 50 mg by mouth 2 (two) times a day.     • isosorbide mononitrate (IMDUR) 30 MG 24 hr tablet TAKE 1 TABLET BY MOUTH ONCE DAILY IN THE MORNING 90 tablet 3   • metFORMIN (GLUCOPHAGE) 500 MG tablet Take 500 mg by mouth Daily With Breakfast.     • nitroglycerin (NITROSTAT) 0.4 MG SL tablet Place 0.4 mg under the tongue Every 5 (Five) Minutes As Needed for Chest Pain. Take no more than 3 doses in 15 minutes.     • O2 (OXYGEN) Inhale 1 (One) Time. Oxygen at night     • omega-3 acid ethyl esters (LOVAZA) 1 g capsule Take 2 g by mouth 2 (Two) Times a Day.     • pantoprazole (PROTONIX) 40 MG EC tablet Take 40 mg by mouth Daily.     • pentoxifylline (TRENtal) 400 MG CR tablet Take 1 tablet by mouth 2 (Two) Times a Day. 60 tablet 5   • primidone (MYSOLINE) 250 MG tablet Take  by mouth 2 (Two) Times a Day.     • propranolol LA (INDERAL LA) 160 MG 24 hr capsule Take 160 mg by mouth Daily.     • rOPINIRole (REQUIP) 1 MG tablet Take 2 mg by mouth Every Night.     • umeclidinium-vilanterol (ANORO ELLIPTA) 62.5-25 MCG/INH aerosol powder  inhaler Inhale 1 puff Daily. 1 each 11   • metFORMIN  (GLUCOPHAGE) 500 MG tablet Take 500 mg by mouth Daily.     • sulfamethoxazole-trimethoprim (Bactrim DS) 800-160 MG per tablet Take 1 tablet by mouth 2 (Two) Times a Day. 14 tablet 0     No current facility-administered medications for this visit.       OBJECTIVE:    Physical Exam:   Vitals reviewed.   Constitutional:       Appearance: Well-groomed and overweight. Chronically ill-appearing.   Eyes:      General: Lids are normal.      Conjunctiva/sclera: Conjunctivae normal.      Pupils: Pupils are equal, round, and reactive to light.   HENT:      Head: Normocephalic and atraumatic.   Neck:      Thyroid: No thyromegaly.      Vascular: No JVD. JVD normal.      Trachea: Trachea normal.   Pulmonary:      Effort: Pulmonary effort is normal.      Breath sounds: Normal breath sounds and air entry. No wheezing.   Cardiovascular:      PMI at left midclavicular line. Normal rate. Regular rhythm. Normal S1 with normal intensity. Normal S2 with normal intensity.      Murmurs: There is no murmur.      No gallop.   Edema:     Peripheral edema present.     Thigh: bilateral trace edema of the thigh.     Pretibial: 2+ edema of the left pretibial area and 3+ edema of the right pretibial area.     Ankle: 2+ edema of the left ankle and 3+ edema of the right ankle.     Feet: bilateral 2+ edema of the feet.  Abdominal:      Palpations: Abdomen is soft. There is no fluid wave.      Tenderness: There is no abdominal tenderness.   Skin:     General: Skin is warm and dry.      Coloration: Skin is not pale.      Findings: Rash present.           Comments: Bilateral lower legs with red and irritated areas.   Neurological:      Mental Status: Alert, oriented to person, place, and time and oriented to person, place and time.   Psychiatric:         Attention and Perception: Attention normal.         Mood and Affect: Mood normal.         Behavior: Behavior is cooperative.       Vitals:    08/29/22 1017   BP: 146/84   BP Location: Left arm   Patient  "Position: Sitting   Cuff Size: Adult   Pulse: 82   SpO2: 98%   Weight: 94.1 kg (207 lb 6.4 oz)   Height: 180.3 cm (71\")       DATA REVIEWED:   Results for orders placed in visit on 03/02/20    Adult Transthoracic Echo Complete W/ Cont if Necessary Per Protocol    Interpretation Summary  · Left ventricular systolic function is normal. LVEF is 51-55%. Mild concentric hypertrophy. Pseudo-normal diastolic function.  · Right ventricular systolic function is normal.  · Mild to moderate tricuspid regurgitation.  · Moderate pulmonic valve regurgitation.  · Mild mitral regurgitation.      CT Head Without Contrast    Result Date: 8/16/2022  IMPRESSION: No acute intracranial process.    XR Chest 1 View    Result Date: 8/16/2022  IMPRESSION: 1.  Mild prominence of pulmonary markings bilaterally.  I have no prior films for comparison.  This most likely represents a chronic finding in a patient with COPD but I cannot exclude an acute interstitial process. 2.  Prior coronary bypass surgery.    US KIDNEY AND RENAL ARTERY DOPPLER    Result Date: 8/17/2022  IMPRESSION: 1.  Morphologically normal kidneys other than bilateral cysts. 2.  No Doppler evidence for significant renal artery stenosis.    CXR:     CT:     Labs: BMP, CBC, LIPID, TSH  Lab Results   Component Value Date    GLUCOSE 151 (H) 02/04/2020    CALCIUM 9.8 02/04/2020     02/04/2020    K 3.7 08/17/2022    CO2 27.0 02/04/2020     02/04/2020    BUN 20 02/04/2020    CREATININE 1.42 (H) 02/04/2020    EGFRIFNONA 50 (L) 02/04/2020    BCR 14.1 02/04/2020    ANIONGAP 12.0 02/04/2020     Lab Results   Component Value Date    WBC 10.4 08/23/2022    HGB 12.6 (L) 08/23/2022    HCT 37.7 (L) 08/23/2022    MCV 92.0 08/23/2022     08/23/2022     No results found for: CHOL  No results found for: TRIG  No results found for: HDL  No components found for: LDLCALC  No results found for: LDL  No results found for: HDLLDLRATIO  No components found for: CHOLHDL  No results " found for: TSH  No results found for: PROBNP  EKG:     TTE: ordered today    9/24/2020 Interpretation Summary    · Left ventricular systolic function is normal. LVEF is 51-55%. Mild concentric hypertrophy. Pseudo-normal diastolic function.  · Right ventricular systolic function is normal.  · Mild to moderate tricuspid regurgitation.  · Moderate pulmonic valve regurgitation.  · Mild mitral regurgitation.     Stress tests:     [facility]: 2/4/ 2020  Graft angiogram:  1.  LIMA to LAD is small in caliber but widely patent.  There is competitive flow from the LAD.  2.  SVG is attached to the RPDA and is retrogradely filling the RPL V system.  The graft is widely patent, target vessel is a medium caliber vessel which is widely patent as well.     Conclusion:  1.  Two-vessel obstructive coronary artery disease involving the LAD and chronic total occlusion of the RCA.  2.  2 out of 2 grafts are patent. LIMA to LAD is small in caliber but patent.  SVG to RCA is widely patent.  3.  Moderately elevated left-sided filling pressures     Complications:  None  EBL: 5ml  Specimen: None      Recommmendations:   1.  Optimize medical therapy for angina  2.  Consider diuresis in the setting of elevated left-sided filling pressures and optimal systemic hypertension control.  3.  IV fluids per protocol for prevention of JERRELL.          The following portions of the patient's history were reviewed and updated as appropriate: allergies, current medications, past family history, past medical history, past social history, past surgical history and problem list.  Old records reviewed and pertinent information is included in the above objective data.     ASSESSMENT/PLAN:     Diagnosis Plan   1. Pulmonary hypertensive venous disease (HCC)  ECG 12 Lead    Adult Transthoracic Echo Complete w/ Color, Spectral and Contrast if Necessary Per Protocol   2. Bilateral cellulitis of lower leg  sulfamethoxazole-trimethoprim (Bactrim DS) 800-160 MG per tablet     Ambulatory Referral to Nephrology       1.  Pulmonary hypertension.  EKG shows sinus rhythm with premature atrial contractions.  T wave inversion in lead V2.  Recently in the hospital with elevated troponins due to type II demand ischemia from hypertension and urosepsis.    Echocardiogram ordered today to assess for regional wall motion abnormalities.  Most likely has worsening pulmonary hypertension.    2.  Bilateral lower leg edema.  Echocardiogram performed at [facility] was reported to have preserved LVEF.  He is having heart failure symptoms with shortness of breath, orthopnea, and bilateral lower leg edema.  Physical exam findings with no gallop or abnormal lung sounds.  Volume overload is strictly in lower lung legs.  We need to get an echocardiogram done here.  Has followed Dr. Son and Dr. Browning for his cardiac care does have pulmonary hypertension    ICM: EF:51-55*%. NYHA Class III-IV, Stage C. Patient is currently volume overloaded.  and in a well perfused physiologic state. Hemodynamics are unacceptable     BETA-BLOCKER: Increased carvedilol from 6.125 to 12.5 mg twice daily  Stop propanolol as this is additional beta-blocker not needed.    ACE/ARB: IRIS  ENTRESTO: not indicated  DIURETIC: Bumex 1 mg daily was increased to 2 mg daily by PCP.  Last creatinine 10 days ago showed 1.9 with a EGFR 35.  He has not followed up with nephrologist.  I offered Lasix subcutaneous injection in office today but he wants to go back to see his PCP for home infusion.  I instructed him to continue Bumex 2 mg daily since he is having good urine output.  He is diuresing and just needs time.  ALDOSTERONE ANTAGONIST: IRIS and elevated potassium in the past  IMDUR/HYDRALAZINE: Imdur 30 mg daily, hydralazine 50 mg twice daily   DIGOXIN: N/A    Fluid restriction: 2 L  Sodium restriction:2 grams  6MWT: Defer  Cardiac Rehab: no  ICD: not indicated   ADHF: no  LVAD: not indicated     Referral sent to nephrology,   [place] for help managing CKD and need for diuresis.    3.  Bilateral lower leg cellulitis.  He has what looks to be cellulitis bilateral lower pretibial area.  Areas are reddened.    -Start Bactrim for 7 days  -Referral sent to wound care in [place].  They can help manage lower leg wound/cellulitis.    I spent 55 minutes caring for Conrado on this date of service. This time includes time spent by me in the following activities: preparing for the visit, reviewing tests, obtaining and/or reviewing a separately obtained history, performing a medically appropriate examination and/or evaluation, counseling and educating the patient/family/caregiver, ordering medications, tests, or procedures, referring and communicating with other health care professionals, documenting information in the medical record, independently interpreting results and communicating that information with the patient/family/caregiver and care coordination  Return in about 2 weeks (around 9/12/2022) for Recheck.        This document has been electronically signed by LAURA Becerra on August 29, 2022 11:48 CDT   Electronically signed by LAURA Becerra, 08/29/22, 10:47 AM CDT.

## 2022-09-01 ENCOUNTER — TELEPHONE (OUTPATIENT)
Dept: CARDIOLOGY | Facility: CLINIC | Age: 72
End: 2022-09-01

## 2022-09-01 NOTE — TELEPHONE ENCOUNTER
Contacted PT per Anne Marie Lopez about questions. PT voiced he is going to keep taking the propanlol even though anne marie said to stop it.  Otherwise he voiced his understanding.    ----- Message from LAURA Becerra sent at 8/29/2022 12:04 PM CDT -----  Please call him and have him stop taking his propanolol. We still had it on his medication list but it wasn't on the note I received from his primary doctor.    I have increased his carvedilol to 12.5 mg twice daily. He has room in his blood pressure to go up on this dose.     If he still have a lot of 6.125 tablets, he can double up on them so that he is not wasting medication. Take 2 in the morning and 2 in the evening.

## 2022-09-14 ENCOUNTER — OFFICE VISIT (OUTPATIENT)
Dept: CARDIOLOGY | Facility: CLINIC | Age: 72
End: 2022-09-14

## 2022-09-14 VITALS
OXYGEN SATURATION: 94 % | WEIGHT: 201.1 LBS | BODY MASS INDEX: 28.15 KG/M2 | HEART RATE: 71 BPM | DIASTOLIC BLOOD PRESSURE: 74 MMHG | HEIGHT: 71 IN | SYSTOLIC BLOOD PRESSURE: 130 MMHG

## 2022-09-14 DIAGNOSIS — I27.20 PULMONARY HYPERTENSION: Primary | ICD-10-CM

## 2022-09-14 DIAGNOSIS — R60.0 LOWER LEG EDEMA: ICD-10-CM

## 2022-09-14 PROCEDURE — 99214 OFFICE O/P EST MOD 30 MIN: CPT | Performed by: NURSE PRACTITIONER

## 2022-09-14 RX ORDER — TAMSULOSIN HYDROCHLORIDE 0.4 MG/1
0.4 CAPSULE ORAL
COMMUNITY
Start: 2022-09-07 | End: 2022-10-08

## 2022-09-14 RX ORDER — FUROSEMIDE 40 MG/1
40 TABLET ORAL 2 TIMES DAILY
COMMUNITY
Start: 2022-09-01 | End: 2022-10-13

## 2022-09-14 RX ORDER — DUTASTERIDE 0.5 MG/1
0.5 CAPSULE, LIQUID FILLED ORAL DAILY
COMMUNITY
Start: 2022-09-08 | End: 2022-10-09

## 2022-09-14 NOTE — PROGRESS NOTES
Pulmonary hypertensive venous disease       History of Present Illness  Mr. Conrado Cazares is a 72-year-old male with medical history of pulmonary hypertension, hypertension, hyperlipidemia, PVD, COPD stage III, CKD, HALIMA coronary artery disease with CABG.  He also has nonrheumatic mitral valve regurgitation, tricuspid valve regurgitation and pulmonary valve insufficiency.    He was recently admitted to [facility] in [place] with urosepsis, acute kidney injury, non-STEMI type II demand ischemia due to hypertension and sepsis.  Found to have Klebsiella in urine and developed hypotension.  An echocardiogram was performed there and showed preserved LV EF but pulmonary hypertension.  Last labs showed creatinine 1.9, EGFR 35.    8/29/2022 He presents today for hospital follow-up.  He complains of shortness of breath and bilateral lower leg swelling.  This is gotten worse over the last couple of days.  He has good urinary output and he reports that it is pale yellow in he has increased frequency since Bumex has increased to 1 mg twice daily.  This was started on Friday.  His weight at her office was 206 and today 207.  He has known pulmonary hypertension.  Blood pressure slightly elevated today.    Today, he presents for 2-week follow-up.  He went to see his PCP who gave him 80 mg of IV Lasix (8/31/2022)(9/1/2022).  He was admitted to [facility] on 9/5/2022 with bladder obstruction and acute kidney injury.  And found to have BPH.  He presents with Benjamin catheter to drainage bag.  Son is with him today he also developed an IRIS with creatinine at 2.9 but is now at 1.8.  He is seeing nephrologist and urologist due to having bladder and prostate obstruction.    We treated his lower leg cellulitis a few weeks ago with Bactrim.  This evidently caused some worsening of renal function.  However,  they did failed to mention the amount of IV Lasix that was given by PCP prior to admission for urinary retention and IRIS.    Today  his lower leg edema has improved some but still has some redness left lower leg.  Son reports that it is better.   He does have home health coming to see him.  He has leg pumps at home that has not been used.    The ASCVD Risk score ([place] VIPUL Jr., et al., 2013) failed to calculate for the following reasons:    Cannot find a previous HDL lab    Cannot find a previous total cholesterol lab    Cardiac Risk Factors:  diabetes mellitus, hypercholesterolemia, hypertension, Sedentary life style and Obesity, former smoker  Previous PCI and History of Heart Failure    Past Medical History:   Diagnosis Date   • Anxiety    • Class 1 obesity due to excess calories with serious comorbidity and body mass index (BMI) of 31.0 to 31.9 in adult 9/6/2019   • COPD (chronic obstructive pulmonary disease) ([facility])    • Dehydration    • Depression    • Dyslipidemia    • Emphysema of lung (HCC)    • Essential (primary) hypertension    • Essential hypertension    • GERD (gastroesophageal reflux disease)    • Heat exhaustion    • Hyperlipidemia    • Neuropathy     bilateral legs    • Nicotine dependence      other tobacco product, uncomplicated - vapor cig      • Other atherosclerosis of native arteries of extremities, bilateral legs (HCC)     with intermittent claudication   • Renal failure     stage 3   • Sleep apnea    • Tobacco dependence syndrome      Past Surgical History:   Procedure Laterality Date   • CARDIAC CATHETERIZATION     • CARDIAC CATHETERIZATION N/A 2/4/2020    Procedure: Left Heart Cath/PCI if indicated;  Surgeon: Cierra Franklin MD;  Location: [facility] CATH INVASIVE LOC[facility];  Service: Cardiology;  Laterality: N/A;   • CORONARY ARTERY BYPASS GRAFT     • GALLBLADDER SURGERY       Social History     Socioeconomic History   • Marital status:    Tobacco Use   • Smoking status: Current Every Day Smoker     Types: Electronic Cigarette   • Smokeless tobacco: Never Used   Substance and Sexual Activity   • Alcohol use: No   • Drug  use: No   • Sexual activity: Defer     Family History   Problem Relation Age of Onset   • Heart disease Other        ALLERGIES:  Allergies   Allergen Reactions   • Dopamine Dizziness     Dropped BP          Review of Systems   Constitutional: Positive for malaise/fatigue. Negative for fever and night sweats.        Recent urosepsis   HENT: Negative for congestion and sore throat.    Cardiovascular: Positive for dyspnea on exertion, leg swelling and orthopnea. Negative for chest pain, near-syncope and palpitations.   Respiratory: Positive for shortness of breath. Negative for cough and wheezing.    Skin: Positive for rash. Negative for flushing.   Musculoskeletal: Negative for falls, muscle weakness and myalgias.   Gastrointestinal: Positive for anorexia. Negative for bloating and nausea.   Genitourinary: Positive for dysuria and hematuria.        Has Benjamin to drainage bag due to bladder obstruction/prostate enlargement    Neurological: Negative for dizziness, light-headedness and loss of balance.   Psychiatric/Behavioral: Negative for depression. The patient is not nervous/anxious.        Current Outpatient Medications   Medication Sig Dispense Refill   • albuterol sulfate  (90 Base) MCG/ACT inhaler Inhale 2 puffs Every 4 (Four) Hours As Needed for Wheezing or Shortness of Air. 18 g 11   • aspirin 325 MG tablet Take 325 mg by mouth Daily.     • atorvastatin (LIPITOR) 20 MG tablet TAKE 1 TABLET BY MOUTH ONCE DAILY AT NIGHT 30 tablet 11   • bumetanide (BUMEX) 1 MG tablet Take 1 mg by mouth Daily.     • carvedilol (COREG) 12.5 MG tablet Take 1 tablet by mouth 2 (Two) Times a Day. 180 tablet 3   • Cholecalciferol (VITAMIN D3) 1000 units capsule Take 1,000 Units by mouth Daily.     • dutasteride (AVODART) 0.5 MG capsule Take 0.5 mg by mouth Daily.     • escitalopram (LEXAPRO) 20 MG tablet Take 10 mg by mouth Daily. 3 tablets by mouth daily     • furosemide (LASIX) 40 MG tablet Take 40 mg by mouth 2 (Two) Times a  Day.     • gabapentin (NEURONTIN) 100 MG capsule Take 100 mg by mouth 3 (Three) Times a Day. 1 am 2 pm     • hydrALAZINE (APRESOLINE) 50 MG tablet Take 50 mg by mouth 2 (two) times a day.     • nitroglycerin (NITROSTAT) 0.4 MG SL tablet Place 0.4 mg under the tongue Every 5 (Five) Minutes As Needed for Chest Pain. Take no more than 3 doses in 15 minutes.     • O2 (OXYGEN) Inhale 1 (One) Time. Oxygen at night     • omega-3 acid ethyl esters (LOVAZA) 1 g capsule Take 2 g by mouth 2 (Two) Times a Day.     • pantoprazole (PROTONIX) 40 MG EC tablet Take 40 mg by mouth Daily.     • pentoxifylline (TRENtal) 400 MG CR tablet Take 1 tablet by mouth 2 (Two) Times a Day. 60 tablet 5   • sulfamethoxazole-trimethoprim (Bactrim DS) 800-160 MG per tablet Take 1 tablet by mouth 2 (Two) Times a Day. 14 tablet 0   • tamsulosin (FLOMAX) 0.4 MG capsule 24 hr capsule Take 0.4 mg by mouth.     • umeclidinium-vilanterol (ANORO ELLIPTA) 62.5-25 MCG/INH aerosol powder  inhaler Inhale 1 puff Daily. 1 each 11   • isosorbide mononitrate (IMDUR) 30 MG 24 hr tablet TAKE 1 TABLET BY MOUTH ONCE DAILY IN THE MORNING 90 tablet 3   • metFORMIN (GLUCOPHAGE) 500 MG tablet Take 500 mg by mouth Daily.     • metFORMIN (GLUCOPHAGE) 500 MG tablet Take 500 mg by mouth Daily With Breakfast.       No current facility-administered medications for this visit.       OBJECTIVE:    Physical Exam:   Vitals reviewed.   Constitutional:       Appearance: Well-groomed and overweight. Chronically ill-appearing.   Eyes:      General: Lids are normal.      Conjunctiva/sclera: Conjunctivae normal.      Pupils: Pupils are equal, round, and reactive to light.   HENT:      Head: Normocephalic and atraumatic.   Neck:      Vascular: No JVD. JVD normal.      Trachea: Trachea normal.   Pulmonary:      Effort: Pulmonary effort is normal.      Breath sounds: Normal breath sounds and air entry. No decreased air movement. No wheezing. No rhonchi.   Cardiovascular:      Normal rate.  "Regular rhythm. Normal S1 with normal intensity. Normal S2 with normal intensity.      Murmurs: There is no murmur.      No gallop.   Edema:     Peripheral edema present.     Thigh: bilateral trace edema of the thigh.     Pretibial: bilateral trace edema of the pretibial area.     Ankle: bilateral trace edema of the ankle.     Feet: bilateral trace edema of the feet.  Abdominal:      Palpations: Abdomen is soft. There is no fluid wave.      Tenderness: There is no abdominal tenderness.   Skin:     General: Skin is warm and dry.      Capillary Refill: Capillary refill takes less than 2 seconds.      Coloration: Skin is not pale.      Findings: Rash present.           Comments: Left lower leg with erythemic area, slightly improved   Neurological:      Mental Status: Alert, oriented to person, place, and time and oriented to person, place and time.      Gait: Gait is intact.   Psychiatric:         Attention and Perception: Attention normal.         Mood and Affect: Mood normal.         Speech: Speech normal.       Vitals:    09/14/22 1004   BP: 130/74   BP Location: Left arm   Patient Position: Sitting   Cuff Size: Adult   Pulse: 71   SpO2: 94%   Weight: 91.2 kg (201 lb 1.6 oz)   Height: 180.3 cm (71\")       DATA REVIEWED:   Results for orders placed in visit on 03/02/20    Adult Transthoracic Echo Complete W/ Cont if Necessary Per Protocol    Interpretation Summary  · Left ventricular systolic function is normal. LVEF is 51-55%. Mild concentric hypertrophy. Pseudo-normal diastolic function.  · Right ventricular systolic function is normal.  · Mild to moderate tricuspid regurgitation.  · Moderate pulmonic valve regurgitation.  · Mild mitral regurgitation.      CT Head Without Contrast    Result Date: 8/16/2022  IMPRESSION: No acute intracranial process.    XR Chest 1 View    Result Date: 9/5/2022  IMPRESSION: No acute disease.    XR Chest 1 View    Result Date: 8/16/2022  IMPRESSION: 1.  Mild prominence of pulmonary " markings bilaterally.  I have no prior films for comparison.  This most likely represents a chronic finding in a patient with COPD but I cannot exclude an acute interstitial process. 2.  Prior coronary bypass surgery.    US KIDNEY AND URINARY BLADDER COMPLETE    Result Date: 9/7/2022  IMPRESSION: 1.  Diffuse and extensive bladder wall thickening with Bnejamin catheter in place. The prostate gland is enlarged. 2.  Normal kidneys. 3.  Right renal length of 11 cm.  Left renal length of 11.7 cm.    CT ABDOMEN AND PELVIS WO CONTRAST KIDNEY STONE PROTOCOL    Result Date: 9/5/2022  IMPRESSION: 1.  No acute inflammatory process throughout the abdomen or pelvis. 2.  Severe distention of the bladder which may be due to outlet obstruction secondary to prostamegaly.  No bladder wall thickening.  There is associated moderate bilateral hydroureteronephrosis.    US KIDNEY AND RENAL ARTERY DOPPLER    Result Date: 8/17/2022  IMPRESSION: 1.  Morphologically normal kidneys other than bilateral cysts. 2.  No Doppler evidence for significant renal artery stenosis.    CXR:     CT:     Labs: BMP, CBC, LIPID, TSH  Lab Results   Component Value Date    GLUCOSE 151 (H) 02/04/2020    CALCIUM 9.8 02/04/2020     02/04/2020    K 3.7 08/17/2022    CO2 27.0 02/04/2020     02/04/2020    BUN 20 02/04/2020    CREATININE 1.42 (H) 02/04/2020    EGFRIFNONA 50 (L) 02/04/2020    BCR 14.1 02/04/2020    ANIONGAP 12.0 02/04/2020     Lab Results   Component Value Date    WBC 8.0 09/10/2022    HGB 10.6 (L) 09/10/2022    HCT 33.6 (L) 09/10/2022    MCV 97.4 (H) 09/10/2022     09/10/2022     No results found for: CHOL  No results found for: TRIG  No results found for: HDL  No components found for: LDLCALC  No results found for: LDL  No results found for: HDLLDLRATIO  No components found for: CHOLHDL  No results found for: TSH  No results found for: PROBNP  EKG:     TTE: ordered today    9/24/2020 Interpretation Summary    · Left ventricular  systolic function is normal. LVEF is 51-55%. Mild concentric hypertrophy. Pseudo-normal diastolic function.  · Right ventricular systolic function is normal.  · Mild to moderate tricuspid regurgitation.  · Moderate pulmonic valve regurgitation.  · Mild mitral regurgitation.     Stress tests:     [facility]: 2/4/ 2020  Graft angiogram:  1.  LIMA to LAD is small in caliber but widely patent.  There is competitive flow from the LAD.  2.  SVG is attached to the RPDA and is retrogradely filling the RPL V system.  The graft is widely patent, target vessel is a medium caliber vessel which is widely patent as well.     Conclusion:  1.  Two-vessel obstructive coronary artery disease involving the LAD and chronic total occlusion of the RCA.  2.  2 out of 2 grafts are patent. LIMA to LAD is small in caliber but patent.  SVG to RCA is widely patent.  3.  Moderately elevated left-sided filling pressures     Complications:  None  EBL: 5ml  Specimen: None      Recommmendations:   1.  Optimize medical therapy for angina  2.  Consider diuresis in the setting of elevated left-sided filling pressures and optimal systemic hypertension control.  3.  IV fluids per protocol for prevention of JERRELL.          The following portions of the patient's history were reviewed and updated as appropriate: allergies, current medications, past family history, past medical history, past social history, past surgical history and problem list.  Old records reviewed and pertinent information is included in the above objective data.     ASSESSMENT/PLAN:     Diagnosis Plan   1. Pulmonary hypertension (HCC)     2. Lower leg edema         1.  Pulmonary hypertension.  8/20222 admitted to [facility] with elevated troponins due to type II demand ischemia from hypertension and urosepsis. 9/2/2022 admitted to [facility] for IRIS, urinary obstruction.    Echocardiogram is scheduled for October 19.    2.  Bilateral lower leg edema.  Most likely heart failure with preserved EF.   Echocardiogram performed at [facility] was reported to have preserved LVEF.  Lower leg edema has improved.  He is seeing nephrology and urologist.  He received IV Lasix at PCP with worsening of kidney function.  Most recent creatinine 1.8, BUN 25 EGFR 37.    ICM: EF:51-55*%. NYHA Class III-IV, Stage C. Patient is currently volume overloaded. /Slightly in lower legs.  And in a well perfused physiologic state. Hemodynamics are acceptable     BETABLOCKER: Carvedilol 12.5 mg twice daily  ACE/ARB: IRIS  ENTRESTO: not indicated  DIURETIC: Furosemide 40 mg twice daily.  Using Bumex as needed (?)  Discussed that these are of similar action and that they are usually not given together.  ALDOSTERONE ANTAGONIST: IRIS and elevated potassium in the past  IMDUR/HYDRALAZINE: Imdur 30 mg daily, hydralazine 50 mg twice daily   DIGOXIN: N/A    Fluid restriction: 2 L  Sodium restriction:2 grams  6MWT: Defer  Cardiac Rehab: no  ICD: not indicated   ADHF: no  LVAD: not indicated     He is now seeing urologist and nephrologist.    3.  Bilateral lower leg cellulitis, improved.  Bactrim was stopped due to worsening kidney function.  Left lower leg still has some redness but improved.    I spent 39 minutes caring for Conrado on this date of service. This time includes time spent by me in the following activities: preparing for the visit, reviewing tests, obtaining and/or reviewing a separately obtained history, performing a medically appropriate examination and/or evaluation, ordering medications, tests, or procedures and documenting information in the medical record     Return in about 3 months (around 12/14/2022) for Recheck.  His PCP is giving him IV Lasix when he needs it.  I instructed him to make an appointment sooner for worsening shortness of breath or increased leg swelling.        This document has been electronically signed by LAURA Becerra on September 14, 2022 11:59 CDT   Electronically signed by Anne Marie MEIER  LAURA Umaña, 09/14/22, 11:59 PM CDT.

## 2022-10-13 ENCOUNTER — OFFICE VISIT (OUTPATIENT)
Dept: CARDIOLOGY | Facility: CLINIC | Age: 72
End: 2022-10-13

## 2022-10-13 VITALS
BODY MASS INDEX: 28.14 KG/M2 | HEIGHT: 71 IN | DIASTOLIC BLOOD PRESSURE: 70 MMHG | WEIGHT: 201 LBS | OXYGEN SATURATION: 97 % | SYSTOLIC BLOOD PRESSURE: 132 MMHG | HEART RATE: 82 BPM

## 2022-10-13 DIAGNOSIS — J43.9 PULMONARY EMPHYSEMA, UNSPECIFIED EMPHYSEMA TYPE: ICD-10-CM

## 2022-10-13 DIAGNOSIS — I25.10 CORONARY ARTERY DISEASE INVOLVING NATIVE CORONARY ARTERY OF NATIVE HEART WITHOUT ANGINA PECTORIS: ICD-10-CM

## 2022-10-13 DIAGNOSIS — I27.20 PULMONARY HYPERTENSION: Primary | ICD-10-CM

## 2022-10-13 PROCEDURE — 99214 OFFICE O/P EST MOD 30 MIN: CPT | Performed by: INTERNAL MEDICINE

## 2022-10-13 RX ORDER — DUTASTERIDE 0.5 MG/1
0.5 CAPSULE, LIQUID FILLED ORAL DAILY
COMMUNITY

## 2022-10-13 RX ORDER — ASPIRIN 81 MG/1
81 TABLET, CHEWABLE ORAL DAILY
COMMUNITY

## 2022-10-13 RX ORDER — TAMSULOSIN HYDROCHLORIDE 0.4 MG/1
1 CAPSULE ORAL DAILY
COMMUNITY

## 2022-10-13 RX ORDER — ROPINIROLE 1 MG/1
1 TABLET, FILM COATED ORAL NIGHTLY
COMMUNITY

## 2022-10-13 RX ORDER — PRIMIDONE 250 MG/1
250 TABLET ORAL 4 TIMES DAILY
COMMUNITY

## 2022-10-13 RX ORDER — CARVEDILOL 25 MG/1
25 TABLET ORAL 2 TIMES DAILY WITH MEALS
COMMUNITY

## 2022-10-13 NOTE — PROGRESS NOTES
[facility] Cardiology  OFFICE NOTE    Cardiovascular Medicine  Cierra Franklin M.D., [facility], FSCAI, RPVI         No referring provider defined for this encounter.    Thank you for asking me to see Conrado Cazares for CAD.    History of Present Illness  This is a 72 y.o. male with:    1.  Coronary artery disease  2.  Hypertension  3.  Hyperlipidemia  4.  CKD  5.  COPD  6.  Pulmonary hypertension.    Conrado Cazares is a 72 y.o. male who presents for consultation today.  Patient has multiple comorbidities including coronary artery disease status post CABG, congestive heart failure preserved ejection fraction, CKD.  His course has been complicated by prostate enlargement causing urinary obstruction and renal failure.  He has a Benjamin in place.  Is being planned for potential UroLift.  Has been doing well from cardiac standpoint.  Denying any chest pain.          [facility]: 2/4/ 2020  Graft angiogram:  1.  LIMA to LAD is small in caliber but widely patent.  There is competitive flow from the LAD.  2.  SVG is attached to the RPDA and is retrogradely filling the RPL V system.  The graft is widely patent, target vessel is a medium caliber vessel which is widely patent as well.     Conclusion:  1.  Two-vessel obstructive coronary artery disease involving the LAD and chronic total occlusion of the RCA.  2.  2 out of 2 grafts are patent. LIMA to LAD is small in caliber but patent.  SVG to RCA is widely patent.  3.  Moderately elevated left-sided filling pressures     Complications:  None  EBL: 5ml  Specimen: None      Recommmendations:   1.  Optimize medical therapy for angina  2.  Consider diuresis in the setting of elevated left-sided filling pressures and optimal systemic hypertension control.  3.  IV fluids per protocol for prevention of JERRELL.    Review of Systems - ROS  Constitution: Negative for weakness, weight gain and weight loss.   HENT: Negative for congestion.    Eyes: Negative for blurred vision.    Cardiovascular: As mentioned above  Respiratory: Negative for cough and hemoptysis.    Endocrine: Negative for polydipsia and polyuria.   Hematologic/Lymphatic: Negative for bleeding problem. Does not bruise/bleed easily.   Skin: Negative for flushing.   Musculoskeletal: Negative for neck pain and stiffness.   Gastrointestinal: Negative for abdominal pain, diarrhea, jaundice, melena, nausea and vomiting.   Genitourinary: Negative for dysuria and hematuria.   Neurological: Negative for dizziness, focal weakness and numbness.   Psychiatric/Behavioral: Negative for altered mental status and depression.          All other systems were reviewed and were negative.    family history includes Heart disease in an other family member.     reports that he has been smoking electronic cigarette. He has never used smokeless tobacco. He reports that he does not drink alcohol and does not use drugs.    Allergies   Allergen Reactions   • Sulfamethoxazole-Trimethoprim Other (See Comments)     Decreased Kidney Function. Pat unable to tolerate.      • Dopamine Dizziness     Dropped BP          Current Outpatient Medications:   •  aspirin 81 MG chewable tablet, Chew 1 tablet Daily., Disp: , Rfl:   •  atorvastatin (LIPITOR) 20 MG tablet, TAKE 1 TABLET BY MOUTH ONCE DAILY AT NIGHT, Disp: 30 tablet, Rfl: 11  •  bumetanide (BUMEX) 1 MG tablet, Take 1 mg by mouth Daily., Disp: , Rfl:   •  carvedilol (COREG) 25 MG tablet, Take 1 tablet by mouth 2 (Two) Times a Day With Meals., Disp: , Rfl:   •  Cholecalciferol (VITAMIN D3) 1000 units capsule, Take 1,000 Units by mouth Daily., Disp: , Rfl:   •  dutasteride (AVODART) 0.5 MG capsule, Take 1 capsule by mouth Daily., Disp: , Rfl:   •  escitalopram (LEXAPRO) 20 MG tablet, Take 10 mg by mouth Daily. 3 tablets by mouth daily, Disp: , Rfl:   •  gabapentin (NEURONTIN) 100 MG capsule, Take 100 mg by mouth 3 (Three) Times a Day. 1 am 2 pm, Disp: , Rfl:   •  hydrALAZINE (APRESOLINE) 50 MG tablet, Take  "50 mg by mouth 2 (two) times a day., Disp: , Rfl:   •  isosorbide mononitrate (IMDUR) 30 MG 24 hr tablet, TAKE 1 TABLET BY MOUTH ONCE DAILY IN THE MORNING, Disp: 90 tablet, Rfl: 3  •  nitroglycerin (NITROSTAT) 0.4 MG SL tablet, Place 0.4 mg under the tongue Every 5 (Five) Minutes As Needed for Chest Pain. Take no more than 3 doses in 15 minutes., Disp: , Rfl:   •  omega-3 acid ethyl esters (LOVAZA) 1 g capsule, Take 2 g by mouth 2 (Two) Times a Day., Disp: , Rfl:   •  pantoprazole (PROTONIX) 40 MG EC tablet, Take 40 mg by mouth Daily., Disp: , Rfl:   •  Potassium 75 MG tablet, Take  by mouth., Disp: , Rfl:   •  primidone (MYSOLINE) 250 MG tablet, Take 1 tablet by mouth 4 (Four) Times a Day., Disp: , Rfl:   •  rOPINIRole (REQUIP) 1 MG tablet, Take 1 tablet by mouth Every Night. Take 1 hour before bedtime., Disp: , Rfl:   •  tamsulosin (FLOMAX) 0.4 MG capsule 24 hr capsule, Take 1 capsule by mouth Daily., Disp: , Rfl:   •  metFORMIN (GLUCOPHAGE) 500 MG tablet, Take 500 mg by mouth Daily., Disp: , Rfl:   •  umeclidinium-vilanterol (ANORO ELLIPTA) 62.5-25 MCG/INH aerosol powder  inhaler, Inhale 1 puff Daily., Disp: 1 each, Rfl: 11    Physical Exam:  Vitals:    10/13/22 0801   BP: 132/70   BP Location: Left arm   Patient Position: Sitting   Cuff Size: Adult   Pulse: 82   SpO2: 97%   Weight: 91.2 kg (201 lb)   Height: 180.3 cm (71\")   PainSc: 0-No pain     Current Pain Level: none  Pulse Ox: Normal  on room air  General: alert, appears stated age and cooperative     Body Habitus: well-nourished    HEENT: Head: Normocephalic, no lesions, without obvious abnormality. No arcus senilis, xanthelasma or xanthomas.    Neuro: alert, oriented x3  Pulses: 2+ and symmetric  JVP: Volume/Pulsation: Normal.  Normal waveforms.   Appropriate inspiratory decrease.  No Kussmaul's. No Janis's.   Carotid Exam: no bruit normal pulsation bilaterally   Carotid Volume: normal.     Respirations: no increased work of breathing   Chest:  " Normal    Pulmonary:Normal   Precordium: Normal impulses. P2 is not palpable.  RV Heave: absent  LV Heave: absent  [place]:  normal size and placement  Palpable S4: absent.  Heart rate: normal    Heart Rhythm: regular     Heart Sounds: S1: normal  S2: normal  S3: absent   S4: absent  Opening Snap: absent    Pericardial Rub:  Absent: .    Abdomen:   Appearance: normal .  Palpation: Soft, non-tender to palpation, bowel sounds positive in all four quadrants; no guarding or rebound tenderness  Extremity: Chronic venous static changes with the lower extremity swelling.      DATA REVIEWED:     EKG. I personally reviewed and interpreted the EKG.      ECG/EMG Results (all)     None        ---------------------------------------------------  TTE/BRIANNA:  Results for orders placed in visit on 03/02/20    Adult Transthoracic Echo Complete W/ Cont if Necessary Per Protocol    Interpretation Summary  · Left ventricular systolic function is normal. LVEF is 51-55%. Mild concentric hypertrophy. Pseudo-normal diastolic function.  · Right ventricular systolic function is normal.  · Mild to moderate tricuspid regurgitation.  · Moderate pulmonic valve regurgitation.  · Mild mitral regurgitation.    -----------------------------------------------------  CT:   XR Chest 1 View    Result Date: 9/16/2022  IMPRESSION: Faint bibasilar patchy opacities, greater on the right, potentially representing atelectasis with overlying infectious/inflammatory processes not excluded.    US KIDNEY AND URINARY BLADDER COMPLETE    Result Date: 9/21/2022  IMPRESSION: 1.  No hydronephrosis seen on the right. 2.  Mild left hydronephrosis with a small simple cyst. 3.  Marked diffuse wall thickening of the collapsed urinary bladder    US KIDNEY    Result Date: 9/17/2022  IMPRESSION: Distended urinary bladder containing debris.  This causes mild bilateral pelviectasis but no amber  hydronephrosis.        --------------------------------------------------------------------------------------------------  LABS:     The CVD Risk score (Tali et al., 2008) failed to calculate for the following reasons:    The patient has a prior MI, stroke, CHF, or peripheral vascular disease diagnosis         Lab Results   Component Value Date    GLUCOSE 151 (H) 02/04/2020    BUN 20 02/04/2020    CREATININE 1.42 (H) 02/04/2020    EGFRIFNONA 50 (L) 02/04/2020    BCR 14.1 02/04/2020    K 3.7 08/17/2022    CO2 27.0 02/04/2020    CALCIUM 9.8 02/04/2020     Lab Results   Component Value Date    WBC 7.3 10/03/2022    HGB 8.2 (L) 10/03/2022    HCT 26.0 (L) 10/03/2022    MCV 96.7 (H) 10/03/2022     10/03/2022     No results found for: CHOL, CHLPL, TRIG, HDL, LDL, LDLDIRECT  No results found for: TSH, P8ZPRDS, L6APBPN, THYROIDAB  Lab Results   Component Value Date    TROPONINT 50 (H) 08/16/2022     Lab Results   Component Value Date    HGBA1C 6.3 07/19/2022     No results found for: DDIMER  No results found for: ALT  Lab Results   Component Value Date    HGBA1C 6.3 07/19/2022    HGBA1C 5.9 09/09/2021    HGBA1C 6.3 12/22/2020     Lab Results   Component Value Date    CREATININE 1.42 (H) 02/04/2020     No results found for: IRON, TIBC, FERRITIN  Lab Results   Component Value Date    INR 0.97 02/04/2020    PROTIME 12.7 02/04/2020       [unfilled]    1.  Coronary artery disease:  Status post CABG.  Last cardiac cath was in February 2020.  Showed patent grafts.  He had moderately elevated left-sided filling pressures at that time.  On aspirin/Coreg/Imdur/statin    2.  Congestive heart failure preserved ejection fraction:  Preserved ejection fraction.  Stage II diastolic dysfunction.  Complicated with chronic kidney disease.    He has chronic lower extremity swelling with venous static changes.  Has been following with vascular.  He has Unna boots and compression stockings and venous pumps available at home.   He appears euvolemic and I would recommend continuing the current dosages of his diuretics.  Will need updated lab work.      Prevention:  BMI is >= 25 and <30. (Overweight) The following options were offered after discussion;: exercise counseling/recommendations      Conrado Cazares  reports that he has been smoking electronic cigarette. He has never used smokeless tobacco.. I have educated him on the risk of diseases from using tobacco products such as cancer, COPD and heart disease.     I advised him to quit and he is not willing to quit.    I spent 3  minutes counseling the patient.            This document has been electronically signed by Cierra Franklin MD on October 13, 2022 08:15 CDT

## 2022-10-24 DIAGNOSIS — J43.9 PULMONARY EMPHYSEMA, UNSPECIFIED EMPHYSEMA TYPE: ICD-10-CM

## 2022-10-24 DIAGNOSIS — R06.09 OTHER FORM OF DYSPNEA: ICD-10-CM

## 2022-10-24 DIAGNOSIS — F17.200 SMOKER: Primary | ICD-10-CM

## 2022-10-27 ENCOUNTER — OFFICE VISIT (OUTPATIENT)
Dept: CARDIOLOGY | Facility: CLINIC | Age: 72
End: 2022-10-27

## 2022-10-27 VITALS
OXYGEN SATURATION: 100 % | DIASTOLIC BLOOD PRESSURE: 82 MMHG | BODY MASS INDEX: 28.29 KG/M2 | WEIGHT: 202.1 LBS | HEART RATE: 61 BPM | HEIGHT: 71 IN | SYSTOLIC BLOOD PRESSURE: 158 MMHG

## 2022-10-27 DIAGNOSIS — I50.32 CHRONIC HEART FAILURE WITH PRESERVED EJECTION FRACTION (HFPEF): Primary | ICD-10-CM

## 2022-10-27 PROCEDURE — 99214 OFFICE O/P EST MOD 30 MIN: CPT | Performed by: NURSE PRACTITIONER

## 2022-11-22 ENCOUNTER — OFFICE VISIT (OUTPATIENT)
Dept: CARDIAC SURGERY | Facility: CLINIC | Age: 72
End: 2022-11-22

## 2022-11-22 VITALS
WEIGHT: 209 LBS | HEART RATE: 71 BPM | HEIGHT: 71 IN | DIASTOLIC BLOOD PRESSURE: 74 MMHG | SYSTOLIC BLOOD PRESSURE: 151 MMHG | OXYGEN SATURATION: 98 % | BODY MASS INDEX: 29.26 KG/M2

## 2022-11-22 DIAGNOSIS — I73.9 PVD (PERIPHERAL VASCULAR DISEASE) WITH CLAUDICATION: ICD-10-CM

## 2022-11-22 DIAGNOSIS — I87.2 CHRONIC VENOUS INSUFFICIENCY: ICD-10-CM

## 2022-11-22 DIAGNOSIS — E78.2 MIXED HYPERLIPIDEMIA: Primary | ICD-10-CM

## 2022-11-22 DIAGNOSIS — I82.411 ACUTE DEEP VEIN THROMBOSIS (DVT) OF RIGHT FEMORAL VEIN: ICD-10-CM

## 2022-11-22 PROCEDURE — 99214 OFFICE O/P EST MOD 30 MIN: CPT | Performed by: NURSE PRACTITIONER

## 2022-11-22 NOTE — PATIENT INSTRUCTIONS
Small blood clot in femoral vein.      Recommend anticoagulation with eliquis twice per day.  You are also taking primidone which can reduce the levels of eliquis in the blood.  Please call neurologist to discuss if there is alternative medication to primidone.     Repeat venous ultrasound in 3 months     Start eliquis 5mg twice per day

## 2022-11-23 NOTE — PROGRESS NOTES
CVTS Office Progress Note     11/23/2022    Conrado Cazares  1950    Chief Complaint:    Chief Complaint   Patient presents with   • Peripheral Vascular Disease       HPI:      PCP:  Deepa Servin APRN  Cardiology:  Dr. Browning  Nephrology:  Dr. Deras  Pulmonary: Dr. Cazares    72 y.o. male with HTN(stable, increased risk stroke, rupture), Hyperlipidemia(stable, increased risk cardiovascular events), Obesity(uncontrolled, increased risk cardiovascular events), Smoker(uncontrolled, increased risk cardiovascular events), COPD(stable, increased risk pulmonary complications) and Chronic Kidney Disease(stable, increased risk renal failure) CAD (prior CABG), PVD (chronic, asymptomatic), Carotid stenosis (new, increased risk of CVA).  former smoker and now uses e-cigarette.  Established with [facility] 2014 for claudication, repeat studies post medical management improved, no prior intervention, no current claudication.  Recent LHC grafts patent.  Serial CT scans for lung cancer screening followed by Dr. Cazares, no longer established.  Reports today for bilateral venous with worsening leg edema no other associated signs, symptoms or modifying factors.    2007 CABG [facility], Dr. Sheikh     06/2014 [facility]: 0.66 tri/bi of the RIGHT, and 0.56 tri/bi of the LEFT  12/21/2015 [facility]:  RIGHT .80 triphasic.  LEFT .88 triphasic.  6/27/2016 [facility]:  RIGHT .88 triphasic.  LEFT .79 triphasic.  01/03/17   [facility]:  RIGHT .82  triphasic.  LEFT .70  Triphasic.  07/06/17  [facility]:  RIGHT .86  triphasic.  LEFT .70  Triphasic.  01/18/18   [facility]:  RIGHT 1.01  Triphasic.  LEFT .88  Triphasic.  07/19/18   [facility]:  RIGHT 0.96  Triphasic.  LEFT .79  Triphasic.  01/24/19 [facility]: RIGHT 1.08 Triphasic. LEFT 0.78 Biphasic from femoral to distal  8/1/2019 [facility]: Right 0.91 triphasic.  Left 0.80 triphasic.  8/2020 [facility]: Right 0.79 triphasic.  Left 0.70 triphasic pop, biphasic distally.   3/2021 [facility]: Right 0.91 triphasic.  Left 0.86 triphasic.  5/2022 [facility]: Right 0.75  triphasic.  Left 0.69 triphasic pop, biphasic distally.     8/2020 Carotid Duplex: DIAZ 50-69% mPSV 176c/s Ratio 2.2, LICA 0-49% mPSV 87c/s Ratio 1.5, Antegrade vertebrals  3/2021 Carotid Duplex: DIAZ 50-69% mPSV 176c/s Ratio 1.2, LICA 50-69% mPSV 145c/s Ratio 2.5, Antegrade vertebrals  9/2021 Carotid Duplex: DIAZ 50-69% mPSV 145c/s Ratio 2.7, LICA 50-69% mPSV 150c/s Ratio 2.1, Antegrade vertebrals  5/2022 Carotid Duplex: DIAZ 50-69% mPSV 136c/s Ratio 1.8, LICA 50-69% mPSV 136c/s Ratio 2.6, Antegrade vertebrals    11/2022 bilateral venous: Appears nonocclusive thrombosis right superficial femoral junction proximal GSV.  Pulsatile flow left GSV absent harvested for bypass      The following portions of the patient's history were reviewed and updated as appropriate: allergies, current medications, past family history, past medical history, past social history, past surgical history and problem list.  Recent images independently reviewed.  Available laboratory values reviewed.    PMH:  Past Medical History:   Diagnosis Date   • Anxiety    • Class 1 obesity due to excess calories with serious comorbidity and body mass index (BMI) of 31.0 to 31.9 in adult 9/6/2019   • COPD (chronic obstructive pulmonary disease) ([facility])    • Dehydration    • Depression    • Dyslipidemia    • Emphysema of lung ([facility])    • Essential (primary) hypertension    • Essential hypertension    • GERD (gastroesophageal reflux disease)    • Heat exhaustion    • Hyperlipidemia    • Neuropathy     bilateral legs    • Nicotine dependence      other tobacco product, uncomplicated - vapor cig      • Other atherosclerosis of native arteries of extremities, bilateral legs (HCC)     with intermittent claudication   • Renal failure     stage 3   • Sleep apnea    • Tobacco dependence syndrome      Past Surgical History:   Procedure Laterality Date   • CARDIAC CATHETERIZATION     • CARDIAC CATHETERIZATION N/A 2/4/2020    Procedure: Left Heart Cath/PCI if  indicated;  Surgeon: Cierra Franklin MD;  Location: [facility] CATH INVASIVE LOCATION;  Service: Cardiology;  Laterality: N/A;   • CORONARY ARTERY BYPASS GRAFT     • GALLBLADDER SURGERY       Family History   Problem Relation Age of Onset   • Heart disease Other      Social History     Tobacco Use   • Smoking status: Every Day     Types: Electronic Cigarette   • Smokeless tobacco: Never   Substance Use Topics   • Alcohol use: No   • Drug use: No       ALLERGIES:  Allergies   Allergen Reactions   • Sulfamethoxazole-Trimethoprim Other (See Comments)     Decreased Kidney Function. Pat unable to tolerate.      • Dopamine Dizziness     Dropped BP          MEDICATIONS:    Current Outpatient Medications:   •  aspirin 81 MG chewable tablet, Chew 1 tablet Daily., Disp: , Rfl:   •  atorvastatin (LIPITOR) 20 MG tablet, TAKE 1 TABLET BY MOUTH ONCE DAILY AT NIGHT, Disp: 30 tablet, Rfl: 11  •  bumetanide (BUMEX) 1 MG tablet, Take 1 mg by mouth Daily., Disp: , Rfl:   •  carvedilol (COREG) 25 MG tablet, Take 1 tablet by mouth 2 (Two) Times a Day With Meals., Disp: , Rfl:   •  Cholecalciferol (VITAMIN D3) 1000 units capsule, Take 1,000 Units by mouth Daily., Disp: , Rfl:   •  dutasteride (AVODART) 0.5 MG capsule, Take 1 capsule by mouth Daily., Disp: , Rfl:   •  escitalopram (LEXAPRO) 20 MG tablet, Take 10 mg by mouth Daily. 3 tablets by mouth daily, Disp: , Rfl:   •  gabapentin (NEURONTIN) 100 MG capsule, Take 100 mg by mouth 3 (Three) Times a Day. 1 am 2 pm, Disp: , Rfl:   •  hydrALAZINE (APRESOLINE) 50 MG tablet, Take 50 mg by mouth 2 (two) times a day., Disp: , Rfl:   •  isosorbide mononitrate (IMDUR) 30 MG 24 hr tablet, TAKE 1 TABLET BY MOUTH ONCE DAILY IN THE MORNING, Disp: 90 tablet, Rfl: 3  •  nitroglycerin (NITROSTAT) 0.4 MG SL tablet, Place 0.4 mg under the tongue Every 5 (Five) Minutes As Needed for Chest Pain. Take no more than 3 doses in 15 minutes., Disp: , Rfl:   •  omega-3 acid ethyl esters (LOVAZA) 1 g capsule, Take 2 g  by mouth 2 (Two) Times a Day., Disp: , Rfl:   •  pantoprazole (PROTONIX) 40 MG EC tablet, Take 40 mg by mouth Daily., Disp: , Rfl:   •  Potassium 75 MG tablet, Take  by mouth., Disp: , Rfl:   •  primidone (MYSOLINE) 250 MG tablet, Take 1 tablet by mouth 4 (Four) Times a Day., Disp: , Rfl:   •  rOPINIRole (REQUIP) 1 MG tablet, Take 1 tablet by mouth Every Night. Take 1 hour before bedtime., Disp: , Rfl:   •  tamsulosin (FLOMAX) 0.4 MG capsule 24 hr capsule, Take 1 capsule by mouth Daily., Disp: , Rfl:   •  umeclidinium-vilanterol (ANORO ELLIPTA) 62.5-25 MCG/INH aerosol powder  inhaler, Inhale 1 puff Daily., Disp: 1 each, Rfl: 11  •  apixaban (ELIQUIS) 5 MG tablet tablet, Take 1 tablet by mouth 2 (Two) Times a Day., Disp: 60 tablet, Rfl: 1  •  metFORMIN (GLUCOPHAGE) 500 MG tablet, Take 500 mg by mouth Daily., Disp: , Rfl:       Review of Systems   Constitutional: Negative for chills, decreased appetite, fever and weight loss.   HENT: Negative for congestion, nosebleeds and sore throat.    Eyes: Negative for blurred vision, visual disturbance and visual halos.   Cardiovascular: Negative for chest pain, claudication, dyspnea on exertion and leg swelling.   Respiratory: Negative for cough, shortness of breath, sputum production and wheezing.    Endocrine: Negative for cold intolerance and polyuria.   Hematologic/Lymphatic: Negative for bleeding problem. Bruises/bleeds easily.        Does not report S/S of adverse bleeding event including nose bleeds, hematuria, melena, gingival bleeding, or hematemesis.   Skin: Positive for unusual hair distribution. Negative for flushing and nail changes.   Musculoskeletal: Positive for arthritis, back pain and joint pain.   Gastrointestinal: Negative for bloating, abdominal pain, hematemesis, melena, nausea and vomiting.   Genitourinary: Negative for flank pain and hematuria.   Neurological: Negative for brief paralysis, difficulty with concentration, focal weakness, light-headedness,  "loss of balance, numbness, paresthesias and weakness.        No amaurosis fugax, TIA, or CVA.     Psychiatric/Behavioral: Negative for altered mental status, depression, substance abuse and suicidal ideas.   Allergic/Immunologic: Negative for hives and persistent infections.         Vitals:    11/22/22 1515   BP: 151/74   BP Location: Left arm   Patient Position: Sitting   Cuff Size: Adult   Pulse: 71   SpO2: 98%   Weight: 94.8 kg (209 lb)   Height: 180.3 cm (71\")     Physical Exam   Constitutional: He is oriented to person, place, and time. He appears well-developed.   HENT:   Head: Normocephalic and atraumatic.   Eyes: Pupils are equal, round, and reactive to light. Conjunctivae are normal.   Cardiovascular: Normal rate.   No murmur heard.  Pulmonary/Chest: Effort normal and breath sounds normal. No respiratory distress.   Abdominal: Soft. Bowel sounds are normal. He exhibits no distension.   Musculoskeletal: Normal range of motion. No tenderness.   Neurological: He is alert and oriented to person, place, and time. No cranial nerve deficit.   Skin: Skin is warm and dry. Capillary refill takes 2 to 3 seconds.   There is no evidence of skin breakdown of the bilateral lower extremities.  BLE pink, no evidence of ischemia.  Feet are absent of dependent rubor.   Psychiatric: Judgment normal.   Nursing note and vitals reviewed.      Assessment & Plan     Independent Review of Studies      1. Smoker  Smoking cessation assistance options offered including behavioral counseling (Smoking Cessation Classes), Nicotine replacement therapy (patches or gum), pharmacologic therapy (Chantix, Wellbutrin). Understands tobacco increases risk of expanding AAA, MI, CVA, PAD, carcinoma. Discussion and question answer period 5-7 minutes.    - CT Chest Low Dose Wo; Future    3. Bilateral lower extremity edema/DVT R SFJ  Worsening BLE edema.  This is likely the result of pulsatile flow secondary to hypervolemic state likely due to his " HFpEF, recommend follow-up with cardiology accordingly    Recommend continued use of compression stockings 20-30mmHg daily, may remove at night.  Advised elevation of legs while at rest.  Encouraged daily exercise.     trental for venous stasis dermatitis    Very small SF J/GSV thrombosis.  Recommend 3 months anticoagulation patient is currently on primidone which interferes with NOAC absorption we will plan to start Eliquis he will contact his neurology office to see if there is an alternative to his current primidone therapy if not we will plan to transition to warfarin although this would make his INR also difficult to regulate.    First unprovoked event, consider hypercoagulable evaluation following discontinuing anticoagulation    Will repeat venous in 3 months     - Duplex Venous Lower Extremity - Bilateral CAR; Future    4. Encounter for screening for lung cancer  >30 pack year history, due May 2023.              Detailed discussion regarding risks, benefits, and treatment plan. Images independently reviewed. Patient understands, agrees, and wishes to proceed with plan.       This document has been electronically signed by MAL BelcherCNP-BC @  On November 23, 2022 15:12 CST

## 2022-12-01 RX ORDER — ATORVASTATIN CALCIUM 20 MG/1
20 TABLET, FILM COATED ORAL NIGHTLY
Qty: 30 TABLET | Refills: 11 | Status: SHIPPED | OUTPATIENT
Start: 2022-12-01

## 2023-01-10 DIAGNOSIS — I82.411 ACUTE DEEP VEIN THROMBOSIS (DVT) OF RIGHT FEMORAL VEIN: Primary | ICD-10-CM

## 2023-02-28 ENCOUNTER — OFFICE VISIT (OUTPATIENT)
Dept: PULMONOLOGY | Facility: CLINIC | Age: 73
End: 2023-02-28
Payer: MEDICARE

## 2023-02-28 ENCOUNTER — PROCEDURE VISIT (OUTPATIENT)
Dept: PULMONOLOGY | Facility: CLINIC | Age: 73
End: 2023-02-28
Payer: MEDICARE

## 2023-02-28 ENCOUNTER — OFFICE VISIT (OUTPATIENT)
Dept: CARDIOLOGY | Facility: CLINIC | Age: 73
End: 2023-02-28
Payer: MEDICARE

## 2023-02-28 VITALS
HEART RATE: 60 BPM | HEIGHT: 71 IN | OXYGEN SATURATION: 99 % | WEIGHT: 209 LBS | BODY MASS INDEX: 29.26 KG/M2 | SYSTOLIC BLOOD PRESSURE: 136 MMHG | DIASTOLIC BLOOD PRESSURE: 60 MMHG

## 2023-02-28 VITALS
OXYGEN SATURATION: 97 % | BODY MASS INDEX: 29.31 KG/M2 | SYSTOLIC BLOOD PRESSURE: 136 MMHG | HEART RATE: 63 BPM | WEIGHT: 209.4 LBS | HEIGHT: 71 IN | DIASTOLIC BLOOD PRESSURE: 60 MMHG

## 2023-02-28 DIAGNOSIS — J43.9 PULMONARY EMPHYSEMA, UNSPECIFIED EMPHYSEMA TYPE: ICD-10-CM

## 2023-02-28 DIAGNOSIS — I50.32 CHRONIC HEART FAILURE WITH PRESERVED EJECTION FRACTION (HFPEF): ICD-10-CM

## 2023-02-28 DIAGNOSIS — Z87.891 PERSONAL HISTORY OF NICOTINE DEPENDENCE: ICD-10-CM

## 2023-02-28 DIAGNOSIS — I27.20 PULMONARY HYPERTENSION: Primary | ICD-10-CM

## 2023-02-28 DIAGNOSIS — F17.200 SMOKER: ICD-10-CM

## 2023-02-28 DIAGNOSIS — J44.9 MODERATE COPD (CHRONIC OBSTRUCTIVE PULMONARY DISEASE): Primary | ICD-10-CM

## 2023-02-28 DIAGNOSIS — R06.09 OTHER FORM OF DYSPNEA: ICD-10-CM

## 2023-02-28 PROCEDURE — 94060 EVALUATION OF WHEEZING: CPT | Performed by: INTERNAL MEDICINE

## 2023-02-28 PROCEDURE — 94727 GAS DIL/WSHOT DETER LNG VOL: CPT | Performed by: INTERNAL MEDICINE

## 2023-02-28 PROCEDURE — 99213 OFFICE O/P EST LOW 20 MIN: CPT | Performed by: NURSE PRACTITIONER

## 2023-02-28 PROCEDURE — 94729 DIFFUSING CAPACITY: CPT | Performed by: INTERNAL MEDICINE

## 2023-02-28 PROCEDURE — 99214 OFFICE O/P EST MOD 30 MIN: CPT | Performed by: NURSE PRACTITIONER

## 2023-02-28 RX ORDER — ALBUTEROL SULFATE 90 UG/1
2 AEROSOL, METERED RESPIRATORY (INHALATION) EVERY 4 HOURS PRN
Qty: 18 G | Refills: 5 | Status: SHIPPED | OUTPATIENT
Start: 2023-02-28

## 2023-02-28 NOTE — PROGRESS NOTES
FULL PFT WITH BRONCHODILATOR PERFORMED.     GOOD PATIENT EFFORT AND COOPERATION.     7+ SECOND EXHALATION ON SPIROMETRY, NO PLATEAU ACHIEVED, END OF TEST CRITERIA NOT MET.     ORDERED BY LAURA PEARSON; READ BY DR. BAEZ

## 2023-02-28 NOTE — PROGRESS NOTES
Pulmonary Office Visit      Thank you for asking me to see Conrado Cazares for   Chief Complaint   Patient presents with   • COPD   • Emphysema   • Shortness of Breath       History of Present Illness    Mr. Cazares is a 72 year old patient with Stage 2 COPD who was referred from Cardiology for dyspnea. He last saw Dr. Alvarado in 2021 and recommended a 1 year follow up. He had PFTs prior to this appt, there are not any in the system to refer to. He has mildly/moderate obstruction on PFTs with mildly reduced DLCO.     He was recommend to re-establish with Pulmonology. He does not have any current complaints. He does anything he wants to do without difficultly.   He is on Anoro. He does not have an albuterol inhaler on his list.      Due for LDCT in May 2023. He is not sure that he wants to continue with screening.       Tobacco use history:  Social History     Socioeconomic History   • Marital status:    Tobacco Use   • Smoking status: Former     Packs/day: 2.00     Years: 50.00     Pack years: 100.00     Types: Cigarettes     Start date:      Quit date: 3/29/2014     Years since quittin.9   • Smokeless tobacco: Never   • Tobacco comments:     Will use e-cig now.    Vaping Use   • Vaping Use: Every day   • Start date: 2014   • Substances: Nicotine   • Devices: Refillable tank   Substance and Sexual Activity   • Alcohol use: No   • Drug use: No   • Sexual activity: Defer         Review of Systems: History obtained from chart review and the patient.  Review of Systems    As described in the HPI. Otherwise, remainder of ROS (14 systems) were negative.    Patient Active Problem List   Diagnosis   • PVD (peripheral vascular disease) (HCC)   • Essential hypertension   • Mixed hyperlipidemia   • Reactive depression   • Neuropathy   • Left carotid bruit   • Vapes nicotine containing substance   • Encounter for screening for lung cancer   • Stopped smoking with greater than 40 pack year history    • Pulmonary hypertensive venous disease (HCC)   • Nonrheumatic mitral valve regurgitation   • Nonrheumatic tricuspid valve regurgitation   • Nonrheumatic pulmonary valve insufficiency   • Coronary artery disease involving native coronary artery of native heart without angina pectoris   • Lung nodule   • HALIMA (obstructive sleep apnea)   • Moderate COPD (chronic obstructive pulmonary disease) (HCC)   • Nocturnal hypoxemia   • Personal history of tobacco use, presenting hazards to health   • Dyspnea on exertion   • Abnormal stress echo   • Carotid stenosis, asymptomatic, bilateral   • Acute deep vein thrombosis (DVT) of right femoral vein ([facility])         Current Outpatient Medications:   •  apixaban (ELIQUIS) 5 MG tablet tablet, Take 1 tablet by mouth 2 (Two) Times a Day., Disp: 60 tablet, Rfl: 1  •  aspirin 81 MG chewable tablet, Chew 1 tablet Daily., Disp: , Rfl:   •  atorvastatin (LIPITOR) 20 MG tablet, Take 1 tablet by mouth Every Night., Disp: 30 tablet, Rfl: 11  •  bumetanide (BUMEX) 1 MG tablet, Take 1 tablet by mouth Daily., Disp: , Rfl:   •  carvedilol (COREG) 25 MG tablet, Take 1 tablet by mouth 2 (Two) Times a Day With Meals., Disp: , Rfl:   •  Cholecalciferol (VITAMIN D3) 1000 units capsule, Take 1 capsule by mouth Daily., Disp: , Rfl:   •  dutasteride (AVODART) 0.5 MG capsule, Take 1 capsule by mouth Daily., Disp: , Rfl:   •  escitalopram (LEXAPRO) 20 MG tablet, Take 10 mg by mouth Daily. 3 tablets by mouth daily, Disp: , Rfl:   •  gabapentin (NEURONTIN) 100 MG capsule, Take 1 capsule by mouth 3 (Three) Times a Day. 1 am 2 pm, Disp: , Rfl:   •  hydrALAZINE (APRESOLINE) 50 MG tablet, Take 1 tablet by mouth 2 (two) times a day., Disp: , Rfl:   •  isosorbide mononitrate (IMDUR) 30 MG 24 hr tablet, TAKE 1 TABLET BY MOUTH ONCE DAILY IN THE MORNING, Disp: 90 tablet, Rfl: 3  •  nitroglycerin (NITROSTAT) 0.4 MG SL tablet, Place 1 tablet under the tongue Every 5 (Five) Minutes As Needed for Chest Pain. Take no more  "than 3 doses in 15 minutes., Disp: , Rfl:   •  omega-3 acid ethyl esters (LOVAZA) 1 g capsule, Take 2 capsules by mouth 2 (Two) Times a Day., Disp: , Rfl:   •  pantoprazole (PROTONIX) 40 MG EC tablet, Take 1 tablet by mouth Daily., Disp: , Rfl:   •  primidone (MYSOLINE) 250 MG tablet, Take 1 tablet by mouth 4 (Four) Times a Day., Disp: , Rfl:   •  rOPINIRole (REQUIP) 1 MG tablet, Take 1 tablet by mouth Every Night. Take 1 hour before bedtime., Disp: , Rfl:   •  tamsulosin (FLOMAX) 0.4 MG capsule 24 hr capsule, Take 1 capsule by mouth Daily., Disp: , Rfl:   •  albuterol sulfate  (90 Base) MCG/ACT inhaler, Inhale 2 puffs Every 4 (Four) Hours As Needed for Wheezing., Disp: 18 g, Rfl: 5  •  metFORMIN (GLUCOPHAGE) 500 MG tablet, Take 500 mg by mouth Daily., Disp: , Rfl:     Allergies   Allergen Reactions   • Sulfamethoxazole-Trimethoprim Other (See Comments)     Decreased Kidney Function. Pat unable to tolerate.      • Dopamine Dizziness     Dropped BP        Advance Care Planning   ACP discussion was declined by the patient. Patient does not have an advance directive, declines further assistance.          Objective     /60   Pulse 60   Ht 180.3 cm (71\")   Wt 94.8 kg (209 lb)   SpO2 99%   BMI 29.15 kg/m²     BMI is >= 25 and <30. (Overweight) The following options were offered after discussion;: pharmacological intervention options      Physical Exam    PFTs          PFTs: Done on: 2/28/23 (independently reviewed by myself, interpreted by physician)  Ratio 68  FVC 78  FEV1 70  TLC 78  DLCO 55    Post:  Ratio: 70  FVC: 76  FEV1: 70      Radiology (independently reviewed by me, interpreted by physcian)    No radiology results for the last 30 days.           Assessment       Discussion/ Recommendations:    Diagnosis Plan   1. Moderate COPD (chronic obstructive pulmonary disease) (HCC)  Ratio 70  FEV1: 92  Asymptomatic.    Albuterol HFA PRN  Can stop Anoro due to cost and no improvement with inhaler.       "   2. Personal history of nicotine dependence  CT Chest Low Dose Follow Up Without Contrast  Due on or after 5/18/23.     He does not want to schedule at this time.   He still Vapes, 6mg, but quit cigarettes in 2014.             Follow up: 1 year for stable COPD    I spent 35 minutes caring for Conrado on this date of service. This time includes time spent by me in the following activities: preparing for the visit, reviewing tests, obtaining and/or reviewing a separately obtained history, performing a medically appropriate examination and/or evaluation, counseling and educating the patient/family/caregiver, ordering medications, tests, or procedures, referring and communicating with other health care professionals, documenting information in the medical record, independently interpreting results and communicating that information with the patient/family/caregiver and care coordination            This document has been electronically signed by LAURA Van on February 28, 2023 13:44 CST

## 2023-02-28 NOTE — PROGRESS NOTES
Chronic heart failure with preserved ejection fraction     History of Present Illness  Mr. Conrado Cazares is a 72-year-old male with medical history of pulmonary hypertension, hypertension, hyperlipidemia, PVD, COPD stage III, CKD, HALIMA coronary artery disease with CABG.  He also has nonrheumatic mitral valve regurgitation, tricuspid valve regurgitation and pulmonary valve insufficiency.    He was recently admitted to [facility] in [place] with urosepsis, acute kidney injury, non-STEMI type II demand ischemia due to hypertension and sepsis.  Found to have Klebsiella in urine and developed hypotension.  An echocardiogram was performed there and showed preserved LV EF but pulmonary hypertension.  Last labs showed creatinine 1.9, EGFR 35.    8/29/2022 He presents today for hospital follow-up.  He complains of shortness of breath and bilateral lower leg swelling.  This is gotten worse over the last couple of days.  He has good urinary output and he reports that it is pale yellow in he has increased frequency since Bumex has increased to 1 mg twice daily.  This was started on Friday.  His weight at her office was 206 and today 207.  He has known pulmonary hypertension.  Blood pressure slightly elevated today.    9/14/22, he presents for 2-week follow-up.  He went to see his PCP who gave him 80 mg of IV Lasix (8/31/2022)(9/1/2022).  He was admitted to [facility] on 9/5/2022 with bladder obstruction and acute kidney injury.  And found to have BPH.  He presents with Benjamin catheter to drainage bag.  Son is with him today he also developed an IRIS with creatinine at 2.9 but is now at 1.8.  He is seeing nephrologist and urologist due to having bladder and prostate obstruction.    We treated his lower leg cellulitis a few weeks ago with Bactrim.  This evidently caused some worsening of renal function.  However,  they did failed to mention the amount of IV Lasix that was given by PCP prior to admission for urinary retention  "and IRIS.    His lower leg edema has improved some but still has some redness left lower leg.  Son reports that it is better.   He does have home health coming to see him.  He has leg pumps at home that has not been used. Has leg pumps but can't leave them on.    10/27/22, he presents for follow-up regarding heart failure with preserved EF.  He has seen Dr. Deras nephrology for his stage III CKD.  He is taking Bumex 1 mg daily, lower leg edema slightly improved from last visit.  He has leg pumps at home but has trouble getting them on and off, compliance issue.  Weight stable.  Appetite not good.    Today, he presents for follow-up regarding heart failure with preserved EF.  He tells me that he is \"feeling pretty good.\"  Denying orthopnea, PND, CIFUENTES, or worsening leg swelling.  Does have fatigue but still able to do his ADLs.  Weight is stable.  We discussed medication list and he is not taking Eliquis due to having suprapubic catheter and developing bleeding.  We discussed his risk for developing a pulmonary embolus.  He was placed on this for DVT.  He tells me that he is aware and is not taking it.    The ASCVD Risk score (Morena DK, et al., 2019) failed to calculate for the following reasons:    Cannot find a previous HDL lab    Cannot find a previous total cholesterol lab    Cardiac Risk Factors:  diabetes mellitus, hypercholesterolemia, hypertension, Sedentary life style and Obesity, former smoker  Previous PCI and History of Heart Failure    Past Medical History:   Diagnosis Date   • Anxiety    • Class 1 obesity due to excess calories with serious comorbidity and body mass index (BMI) of 31.0 to 31.9 in adult 9/6/2019   • COPD (chronic obstructive pulmonary disease) (HCC)    • Dehydration    • Depression    • Dyslipidemia    • Emphysema of lung (HCC)    • Essential (primary) hypertension    • Essential hypertension    • GERD (gastroesophageal reflux disease)    • Heat exhaustion    • Hyperlipidemia    • " Neuropathy     bilateral legs    • Nicotine dependence      other tobacco product, uncomplicated - vapor cig      • Other atherosclerosis of native arteries of extremities, bilateral legs (HCC)     with intermittent claudication   • Renal failure     stage 3   • Sleep apnea    • Tobacco dependence syndrome      Past Surgical History:   Procedure Laterality Date   • CARDIAC CATHETERIZATION     • CARDIAC CATHETERIZATION N/A 2020    Procedure: Left Heart Cath/PCI if indicated;  Surgeon: Cierra Franklin MD;  Location: [facility] CATH INVASIVE LOCATION;  Service: Cardiology;  Laterality: N/A;   • CORONARY ARTERY BYPASS GRAFT     • GALLBLADDER SURGERY     • PROSTATE SURGERY       Social History     Socioeconomic History   • Marital status:    Tobacco Use   • Smoking status: Former     Packs/day: 2.00     Years: 50.00     Pack years: 100.00     Types: Cigarettes     Start date:      Quit date: 3/29/2014     Years since quittin.9   • Smokeless tobacco: Never   • Tobacco comments:     Will use e-cig now.    Vaping Use   • Vaping Use: Every day   • Start date: 2014   • Substances: Nicotine   • Devices: Refillable tank   Substance and Sexual Activity   • Alcohol use: No   • Drug use: No   • Sexual activity: Defer     Family History   Problem Relation Age of Onset   • Heart disease Other        ALLERGIES:  Allergies   Allergen Reactions   • Sulfamethoxazole-Trimethoprim Other (See Comments)     Decreased Kidney Function. Pat unable to tolerate.      • Dopamine Dizziness     Dropped BP        Review of Systems   Constitutional: Positive for malaise/fatigue. Negative for fever and night sweats.   HENT: Negative.    Eyes: Negative.    Cardiovascular: Positive for leg swelling (Unchanged). Negative for chest pain, dyspnea on exertion, near-syncope, orthopnea, palpitations and paroxysmal nocturnal dyspnea.   Respiratory: Negative for cough, shortness of breath and wheezing.    Endocrine: Negative for polydipsia  and polyphagia.   Hematologic/Lymphatic: Negative for bleeding problem. Does not bruise/bleed easily.   Skin: Negative.    Musculoskeletal: Negative for falls, muscle cramps and muscle weakness.   Gastrointestinal: Negative for bloating, heartburn and nausea.   Genitourinary:        Has Benjamin to drainage bag due to bladder obstruction/prostate enlargement    Neurological: Negative for dizziness, headaches and light-headedness.   Psychiatric/Behavioral: Negative.        Current Outpatient Medications   Medication Sig Dispense Refill   • albuterol sulfate  (90 Base) MCG/ACT inhaler Inhale 2 puffs Every 4 (Four) Hours As Needed for Wheezing. 18 g 5   • aspirin 81 MG chewable tablet Chew 1 tablet Daily.     • atorvastatin (LIPITOR) 20 MG tablet Take 1 tablet by mouth Every Night. 30 tablet 11   • bumetanide (BUMEX) 1 MG tablet Take 1 tablet by mouth Daily.     • carvedilol (COREG) 25 MG tablet Take 1 tablet by mouth 2 (Two) Times a Day With Meals.     • Cholecalciferol (VITAMIN D3) 1000 units capsule Take 1 capsule by mouth Daily.     • dutasteride (AVODART) 0.5 MG capsule Take 1 capsule by mouth Daily.     • escitalopram (LEXAPRO) 20 MG tablet Take 10 mg by mouth Daily. 3 tablets by mouth daily     • gabapentin (NEURONTIN) 100 MG capsule Take 1 capsule by mouth 3 (Three) Times a Day. 1 am 2 pm     • hydrALAZINE (APRESOLINE) 50 MG tablet Take 1 tablet by mouth 3 (Three) Times a Day.     • isosorbide mononitrate (IMDUR) 30 MG 24 hr tablet TAKE 1 TABLET BY MOUTH ONCE DAILY IN THE MORNING 90 tablet 3   • nitroglycerin (NITROSTAT) 0.4 MG SL tablet Place 1 tablet under the tongue Every 5 (Five) Minutes As Needed for Chest Pain. Take no more than 3 doses in 15 minutes.     • omega-3 acid ethyl esters (LOVAZA) 1 g capsule Take 2 capsules by mouth 2 (Two) Times a Day.     • pantoprazole (PROTONIX) 40 MG EC tablet Take 1 tablet by mouth Daily.     • primidone (MYSOLINE) 250 MG tablet Take 1 tablet by mouth 4 (Four) Times  "a Day.     • rOPINIRole (REQUIP) 1 MG tablet Take 1 tablet by mouth Every Night. Take 1 hour before bedtime.     • tamsulosin (FLOMAX) 0.4 MG capsule 24 hr capsule Take 1 capsule by mouth Daily.     • metFORMIN (GLUCOPHAGE) 500 MG tablet Take 500 mg by mouth Daily.       No current facility-administered medications for this visit.       OBJECTIVE:    Physical Exam:   Vitals reviewed.   Constitutional:       Appearance: Well-groomed and not in distress. Chronically ill-appearing.   Eyes:      General: Lids are normal.      Conjunctiva/sclera: Conjunctivae normal.   HENT:      Head: Normocephalic and atraumatic.   Neck:      Vascular: No JVD. JVD normal.      Trachea: Trachea normal.   Pulmonary:      Effort: Pulmonary effort is normal.      Breath sounds: Normal breath sounds and air entry. No wheezing.   Cardiovascular:      Normal rate. Regular rhythm. Normal S1 with normal intensity. Normal S2 with normal intensity.      Murmurs: There is no murmur.      No gallop.   Edema:     Ankle: bilateral trace edema of the ankle.     Feet: bilateral trace edema of the feet.  Skin:     General: Skin is warm and dry.      Capillary Refill: Capillary refill takes less than 2 seconds.   Neurological:      Mental Status: Alert, oriented to person, place, and time and oriented to person, place and time.   Psychiatric:         Attention and Perception: Attention normal.         Mood and Affect: Mood normal.       Vitals:    02/28/23 1359   BP: 136/60   BP Location: Left arm   Patient Position: Sitting   Cuff Size: Adult   Pulse: 63   SpO2: 97%   Weight: 95 kg (209 lb 6.4 oz)   Height: 180.3 cm (71\")       DATA REVIEWED:   Results for orders placed in visit on 10/19/22    Adult Transthoracic Echo Complete w/ Color, Spectral and Contrast if Necessary Per Protocol    Interpretation Summary  •  Left ventricular ejection fraction appears to be 56 - 60%. Left ventricular systolic function is normal.  •  Left ventricular wall thickness " is consistent with moderate concentric hypertrophy.  •  Left ventricular diastolic function is consistent with (grade II w/high LAP) pseudonormalization.  •  Left atrial volume is mildly increased.  •  Estimated right ventricular systolic pressure from tricuspid regurgitation is mildly elevated (35-45 mmHg).      No radiology results for the last 30 days.  CXR:     CT:     Labs: BMP, CBC, LIPID, TSH  Lab Results   Component Value Date    GLUCOSE 151 (H) 02/04/2020    CALCIUM 9.8 02/04/2020     02/04/2020    K 3.7 08/17/2022    CO2 27.0 02/04/2020     02/04/2020    BUN 20 02/04/2020    CREATININE 1.42 (H) 02/04/2020    EGFRIFNONA 50 (L) 02/04/2020    BCR 14.1 02/04/2020    ANIONGAP 12.0 02/04/2020     Lab Results   Component Value Date    WBC 5.8 01/11/2023    HGB 11.6 (L) 01/11/2023    HCT 34.4 (L) 01/11/2023    MCV 93.2 01/11/2023     01/11/2023     No results found for: CHOL  No results found for: TRIG  No results found for: HDL  No components found for: LDLCALC  No results found for: LDL  No results found for: HDLLDLRATIO  No components found for: CHOLHDL  No results found for: TSH  No results found for: PROBNP  EKG:       [facility]: 2/4/ 2020  Graft angiogram:  1.  LIMA to LAD is small in caliber but widely patent.  There is competitive flow from the LAD.  2.  SVG is attached to the RPDA and is retrogradely filling the RPL V system.  The graft is widely patent, target vessel is a medium caliber vessel which is widely patent as well.     Conclusion:  1.  Two-vessel obstructive coronary artery disease involving the LAD and chronic total occlusion of the RCA.  2.  2 out of 2 grafts are patent. LIMA to LAD is small in caliber but patent.  SVG to RCA is widely patent.  3.  Moderately elevated left-sided filling pressures     Complications:  None  EBL: 5ml  Specimen: None      Recommmendations:   1.  Optimize medical therapy for angina  2.  Consider diuresis in the setting of elevated left-sided filling  pressures and optimal systemic hypertension control.  3.  IV fluids per protocol for prevention of JERRELL.          The following portions of the patient's history were reviewed and updated as appropriate: allergies, current medications, past family history, past medical history, past social history, past surgical history and problem list.  Old records reviewed and pertinent information is included in the above objective data.     ASSESSMENT/PLAN:     Diagnosis Plan   1. Pulmonary hypertensive venous disease (HCC)        2. Chronic heart failure with preserved ejection fraction (HFpEF) ([facility])          1.  Pulmonary hypertension.  Echocardiogram in 10/2022 showing RVSP 38 [id number]/20222 admitted to [facility] with elevated troponins due to type II demand ischemia from hypertension and urosepsis. 9/2/2022 admitted to [facility] for IRIS, urinary obstruction.    Continue Bumex 1 mg daily   Continue antihypertensive medications carvedilol and hydralazine    2.  HFpEF.  Most recent echocardiogram showing preserved LVEF 56 to 60%, grade 2 diastolic dysfunction. Lower leg edema is now trace. He is seeing nephrology and urologist.  He received IV Lasix at PCP with worsening of kidney function.     Following Dr. Deras and most recent labs on  1/23 showing creatinine 1.6, potassium 4.4, sodium 140, chloride 102    ICM: EF:56-60*%. NYHA Class II-III, Stage C. Patient is currently  euvolemic.  And in a well perfused physiologic state. Hemodynamics are acceptable     BETABLOCKER: Carvedilol  25 mg twice daily  ACE/ARB: IRIS  ENTRESTO: not indicated  DIURETIC: Bumex 1mg in am   ALDOSTERONE ANTAGONIST:N/A  IMDUR/HYDRALAZINE: Hydralazine 50 mg three daily   DIGOXIN: N/A    Fluid restriction: 2 L  Sodium restriction:2 grams    6MWT: Defer  Cardiac Rehab: no  ICD: not indicated   ADHF: no  LVAD: not indicated     I spent 24 minutes caring for Conrado on this date of service. This time includes time spent by me in the following activities: reviewing  tests, obtaining and/or reviewing a separately obtained history, performing a medically appropriate examination and/or evaluation, ordering medications, tests, or procedures and documenting information in the medical record     Return in about 6 months (around 8/28/2023) for Recheck.  Instructed him to call the office and move appointment up if he becomes symptomatic.      This document has been electronically signed by LAURA Becerra on February 28, 2023 15:49 CST   Electronically signed by LAURA Becerra, 02/28/23, 3:49 PM CST.

## 2023-03-02 ENCOUNTER — OFFICE VISIT (OUTPATIENT)
Dept: CARDIAC SURGERY | Facility: CLINIC | Age: 73
End: 2023-03-02
Payer: MEDICARE

## 2023-03-02 VITALS
HEIGHT: 71 IN | HEART RATE: 69 BPM | OXYGEN SATURATION: 97 % | WEIGHT: 199 LBS | BODY MASS INDEX: 27.86 KG/M2 | SYSTOLIC BLOOD PRESSURE: 148 MMHG | DIASTOLIC BLOOD PRESSURE: 66 MMHG

## 2023-03-02 DIAGNOSIS — E78.2 MIXED HYPERLIPIDEMIA: ICD-10-CM

## 2023-03-02 DIAGNOSIS — I65.23 CAROTID STENOSIS, ASYMPTOMATIC, BILATERAL: ICD-10-CM

## 2023-03-02 DIAGNOSIS — I73.9 PVD (PERIPHERAL VASCULAR DISEASE) WITH CLAUDICATION: Primary | ICD-10-CM

## 2023-03-02 DIAGNOSIS — F17.200 SMOKER: ICD-10-CM

## 2023-03-02 DIAGNOSIS — Z86.718 HISTORY OF DVT (DEEP VEIN THROMBOSIS): ICD-10-CM

## 2023-03-02 PROCEDURE — 99214 OFFICE O/P EST MOD 30 MIN: CPT | Performed by: NURSE PRACTITIONER

## 2023-03-03 NOTE — PROGRESS NOTES
CVTS Office Progress Note     3/3/2023    Conrado Cazares  1950    Chief Complaint:    Chief Complaint   Patient presents with   • Mixed hyperlipidemia       HPI:      PCP:  Deepa Servin APRN  Cardiology:  Dr. Browning  Nephrology:  Dr. Deras  Pulmonary: Dr. Cazares    72 y.o. male with HTN(stable, increased risk stroke, rupture), Hyperlipidemia(stable, increased risk cardiovascular events), Obesity(uncontrolled, increased risk cardiovascular events), Smoker(uncontrolled, increased risk cardiovascular events), COPD(stable, increased risk pulmonary complications) and Chronic Kidney Disease(stable, increased risk renal failure) CAD (prior CABG), PVD (chronic, asymptomatic), Carotid stenosis (new, increased risk of CVA).  former smoker and now uses e-cigarette.  Established with [facility] 2014 for claudication, repeat studies post medical management improved, no prior intervention, no current claudication.   Reports today for follow-up duplex following diagnosis of DVT, patient reports he stopped his Eliquis and refuses to be placed back on anticoagulation.    2007 CABG [facility], Dr. Sheikh     06/2014 ASHWIN: 0.66 tri/bi of the RIGHT, and 0.56 tri/bi of the LEFT  12/21/2015 ASHWIN:  RIGHT .80 triphasic.  LEFT .88 triphasic.  6/27/2016 ASHWIN:  RIGHT .88 triphasic.  LEFT .79 triphasic.  01/03/17   ASHWIN:  RIGHT .82  triphasic.  LEFT .70  Triphasic.  07/06/17  ASHWIN:  RIGHT .86  triphasic.  LEFT .70  Triphasic.  01/18/18   ASHWIN:  RIGHT 1.01  Triphasic.  LEFT .88  Triphasic.  07/19/18   ASHWIN:  RIGHT 0.96  Triphasic.  LEFT .79  Triphasic.  01/24/19 ASHWIN: RIGHT 1.08 Triphasic. LEFT 0.78 Biphasic from femoral to distal  8/1/2019 ASHWIN: Right 0.91 triphasic.  Left 0.80 triphasic.  8/2020 ASHWIN: Right 0.79 triphasic.  Left 0.70 triphasic pop, biphasic distally.   3/2021 ASHWIN: Right 0.91 triphasic.  Left 0.86 triphasic.  5/2022 ASHWIN: Right 0.75 triphasic.  Left 0.69 triphasic pop, biphasic distally.     8/2020 Carotid Duplex: DIAZ  50-69% mPSV 176c/s Ratio 2.2, LICA 0-49% mPSV 87c/s Ratio 1.5, Antegrade vertebrals  3/2021 Carotid Duplex: DIAZ 50-69% mPSV 176c/s Ratio 1.2, LICA 50-69% mPSV 145c/s Ratio 2.5, Antegrade vertebrals  9/2021 Carotid Duplex: DIAZ 50-69% mPSV 145c/s Ratio 2.7, LICA 50-69% mPSV 150c/s Ratio 2.1, Antegrade vertebrals  5/2022 Carotid Duplex: DIAZ 50-69% mPSV 136c/s Ratio 1.8, LICA 50-69% mPSV 136c/s Ratio 2.6, Antegrade vertebrals    11/2022 bilateral venous: Appears nonocclusive thrombosis right superficial femoral junction proximal GSV.  Pulsatile flow left GSV absent harvested for bypass  3/2023 Left venous: Negative for DVT    The following portions of the patient's history were reviewed and updated as appropriate: allergies, current medications, past family history, past medical history, past social history, past surgical history and problem list.  Recent images independently reviewed.  Available laboratory values reviewed.    PMH:  Past Medical History:   Diagnosis Date   • Anxiety    • Class 1 obesity due to excess calories with serious comorbidity and body mass index (BMI) of 31.0 to 31.9 in adult 9/6/2019   • COPD (chronic obstructive pulmonary disease) (HCC)    • Dehydration    • Depression    • Dyslipidemia    • Emphysema of lung (HCC)    • Essential (primary) hypertension    • Essential hypertension    • GERD (gastroesophageal reflux disease)    • Heat exhaustion    • Hyperlipidemia    • Neuropathy     bilateral legs    • Nicotine dependence      other tobacco product, uncomplicated - vapor cig      • Other atherosclerosis of native arteries of extremities, bilateral legs (HCC)     with intermittent claudication   • Renal failure     stage 3   • Sleep apnea    • Tobacco dependence syndrome      Past Surgical History:   Procedure Laterality Date   • CARDIAC CATHETERIZATION     • CARDIAC CATHETERIZATION N/A 02/04/2020    Procedure: Left Heart Cath/PCI if indicated;  Surgeon: Cierra Franklin MD;  Location: [facility]  CATH INVASIVE LOCATION;  Service: Cardiology;  Laterality: N/A;   • CORONARY ARTERY BYPASS GRAFT     • GALLBLADDER SURGERY     • PROSTATE SURGERY       Family History   Problem Relation Age of Onset   • Heart disease Other      Social History     Tobacco Use   • Smoking status: Former     Packs/day: 2.00     Years: 50.00     Pack years: 100.00     Types: Cigarettes     Start date:      Quit date: 3/29/2014     Years since quittin.9   • Smokeless tobacco: Never   • Tobacco comments:     Will use e-cig now.    Vaping Use   • Vaping Use: Every day   • Start date: 2014   • Substances: Nicotine   • Devices: Fair and Squareble tank   Substance Use Topics   • Alcohol use: No   • Drug use: No       ALLERGIES:  Allergies   Allergen Reactions   • Sulfamethoxazole-Trimethoprim Other (See Comments)     Decreased Kidney Function. Pat unable to tolerate.      • Dopamine Dizziness     Dropped BP          MEDICATIONS:    Current Outpatient Medications:   •  albuterol sulfate  (90 Base) MCG/ACT inhaler, Inhale 2 puffs Every 4 (Four) Hours As Needed for Wheezing., Disp: 18 g, Rfl: 5  •  aspirin 81 MG chewable tablet, Chew 1 tablet Daily., Disp: , Rfl:   •  atorvastatin (LIPITOR) 20 MG tablet, Take 1 tablet by mouth Every Night., Disp: 30 tablet, Rfl: 11  •  bumetanide (BUMEX) 1 MG tablet, Take 1 tablet by mouth Daily., Disp: , Rfl:   •  carvedilol (COREG) 25 MG tablet, Take 1 tablet by mouth 2 (Two) Times a Day With Meals., Disp: , Rfl:   •  Cholecalciferol (VITAMIN D3) 1000 units capsule, Take 1 capsule by mouth Daily., Disp: , Rfl:   •  dutasteride (AVODART) 0.5 MG capsule, Take 1 capsule by mouth Daily., Disp: , Rfl:   •  escitalopram (LEXAPRO) 20 MG tablet, Take 10 mg by mouth Daily. 3 tablets by mouth daily, Disp: , Rfl:   •  gabapentin (NEURONTIN) 100 MG capsule, Take 1 capsule by mouth 3 (Three) Times a Day. 1 am 2 pm, Disp: , Rfl:   •  hydrALAZINE (APRESOLINE) 50 MG tablet, Take 1 tablet by mouth 3 (Three) Times a  Day., Disp: , Rfl:   •  isosorbide mononitrate (IMDUR) 30 MG 24 hr tablet, TAKE 1 TABLET BY MOUTH ONCE DAILY IN THE MORNING, Disp: 90 tablet, Rfl: 3  •  nitroglycerin (NITROSTAT) 0.4 MG SL tablet, Place 1 tablet under the tongue Every 5 (Five) Minutes As Needed for Chest Pain. Take no more than 3 doses in 15 minutes., Disp: , Rfl:   •  omega-3 acid ethyl esters (LOVAZA) 1 g capsule, Take 2 capsules by mouth 2 (Two) Times a Day., Disp: , Rfl:   •  pantoprazole (PROTONIX) 40 MG EC tablet, Take 1 tablet by mouth Daily., Disp: , Rfl:   •  primidone (MYSOLINE) 250 MG tablet, Take 1 tablet by mouth 4 (Four) Times a Day., Disp: , Rfl:   •  rOPINIRole (REQUIP) 1 MG tablet, Take 1 tablet by mouth Every Night. Take 1 hour before bedtime., Disp: , Rfl:   •  tamsulosin (FLOMAX) 0.4 MG capsule 24 hr capsule, Take 1 capsule by mouth Daily., Disp: , Rfl:   •  metFORMIN (GLUCOPHAGE) 500 MG tablet, Take 500 mg by mouth Daily., Disp: , Rfl:       Review of Systems   Constitutional: Negative for chills, decreased appetite and fever.   HENT: Negative for congestion, nosebleeds and sore throat.    Eyes: Negative for blurred vision, visual disturbance and visual halos.   Cardiovascular: Negative for chest pain, claudication, dyspnea on exertion and leg swelling.   Respiratory: Negative for cough, shortness of breath, sputum production and wheezing.    Endocrine: Negative for cold intolerance and polyuria.   Hematologic/Lymphatic: Negative for bleeding problem. Bruises/bleeds easily.        Does not report S/S of adverse bleeding event including nose bleeds, hematuria, melena, gingival bleeding, or hematemesis.   Skin: Positive for unusual hair distribution. Negative for flushing and nail changes.   Musculoskeletal: Positive for arthritis, back pain and joint pain.   Gastrointestinal: Negative for bloating, abdominal pain, hematemesis, melena, nausea and vomiting.   Genitourinary: Negative for flank pain and hematuria.   Neurological:  "Negative for brief paralysis, difficulty with concentration, focal weakness, light-headedness, loss of balance, numbness, paresthesias and weakness.        No amaurosis fugax, TIA, or CVA.     Psychiatric/Behavioral: Negative for altered mental status, depression, substance abuse and suicidal ideas.   Allergic/Immunologic: Negative for hives and persistent infections.         Vitals:    03/02/23 1450   BP: 148/66   Pulse: 69   SpO2: 97%   Weight: 90.3 kg (199 lb)   Height: 180.3 cm (71\")     Physical Exam   Constitutional: He is oriented to person, place, and time. He appears well-developed.   HENT:   Head: Normocephalic and atraumatic.   Eyes: Pupils are equal, round, and reactive to light. Conjunctivae are normal.   Cardiovascular: Normal rate.   No murmur heard.  Pulmonary/Chest: Effort normal and breath sounds normal. No respiratory distress.   Abdominal: Soft. Bowel sounds are normal. He exhibits no distension.   Musculoskeletal: Normal range of motion. No tenderness.      Right lower leg: No edema.      Left lower leg: No edema.   Neurological: He is alert and oriented to person, place, and time. No cranial nerve deficit.   Skin: Skin is warm and dry. Capillary refill takes 2 to 3 seconds.   There is no evidence of skin breakdown of the bilateral lower extremities.  BLE pink, no evidence of ischemia.  Feet are absent of dependent rubor.   Psychiatric: Judgment normal.   Nursing note and vitals reviewed.      Assessment & Plan     Independent Review of Studies    1. PVD (peripheral vascular disease) with claudication (HCC)  Known moderate reduction of bilateral lower extremity perfusion.  Remains on aspirin, statin, nitrates.    No surgical intervention indicated at this time follow-up in 6 months for repeat ASHWIN    Foot care practices to include daily inspection, bathing in warm never hot water, toenail care, wearing clean dry socks, daily foot moisturize, never walk barefoot    - Doppler Ankle Brachial Index " Single Level CAR; Future    2. Carotid stenosis, asymptomatic, bilateral  Moderate bilateral asymptomatic carotid disease.    Follow-up with interval imaging repeat carotid duplex in 6 months    - Duplex Carotid Ultrasound CAR; Future    3. History of DVT (deep vein thrombosis)  Patient discontinued his anticoagulation fortunately there is no evidence of DVT on today's exam.    First event unprovoked which increases his risk of recurrence patient understands but despite lengthy discussion refuses further anticoagulation if necessary.    4. Mixed hyperlipidemia  Lipid-lowering therapy has been proven beneficial in patients with cardio-vascular disease. Current guidelines recommend statin treatment for all patients with PAD,CAD and carotid stenosis. Statins are beneficial in preventing cardiovascular events, increasing functional capacity and lower the risk of adverse limb loss in PAD. Statins decrease the progression of plaque formation and may improve peripheral vessel lining, and aid in reversing atherosclerosis.    5. Smoker  Smoking cessation assistance options offered including behavioral counseling (Smoking Cessation Classes), Nicotine replacement therapy (patches or gum), pharmacologic therapy (Chantix, Wellbutrin). Understands tobacco increases risk of expanding AAA, MI, CVA, PAD, carcinoma. Discussion and question answer period 5-7 minutes.                Detailed discussion regarding risks, benefits, and treatment plan. Images independently reviewed. Patient understands, agrees, and wishes to proceed with plan.       This document has been electronically signed by MAL [email or web address] @  On March 3, 2023 13:36 CST

## 2023-04-13 ENCOUNTER — OFFICE VISIT (OUTPATIENT)
Dept: CARDIOLOGY | Facility: CLINIC | Age: 73
End: 2023-04-13
Payer: MEDICARE

## 2023-04-13 VITALS
OXYGEN SATURATION: 99 % | WEIGHT: 200.8 LBS | DIASTOLIC BLOOD PRESSURE: 80 MMHG | HEART RATE: 60 BPM | BODY MASS INDEX: 28.11 KG/M2 | SYSTOLIC BLOOD PRESSURE: 170 MMHG | HEIGHT: 71 IN

## 2023-04-13 DIAGNOSIS — I10 HYPERTENSION, ESSENTIAL: Primary | ICD-10-CM

## 2023-04-13 DIAGNOSIS — E78.2 HYPERLIPEMIA, MIXED: ICD-10-CM

## 2023-04-13 DIAGNOSIS — I25.83 CORONARY ARTERY DISEASE DUE TO LIPID RICH PLAQUE: ICD-10-CM

## 2023-04-13 DIAGNOSIS — I25.10 CORONARY ARTERY DISEASE DUE TO LIPID RICH PLAQUE: ICD-10-CM

## 2023-04-13 PROCEDURE — 1159F MED LIST DOCD IN RCRD: CPT | Performed by: INTERNAL MEDICINE

## 2023-04-13 PROCEDURE — 3079F DIAST BP 80-89 MM HG: CPT | Performed by: INTERNAL MEDICINE

## 2023-04-13 PROCEDURE — 3077F SYST BP >= 140 MM HG: CPT | Performed by: INTERNAL MEDICINE

## 2023-04-13 PROCEDURE — 1160F RVW MEDS BY RX/DR IN RCRD: CPT | Performed by: INTERNAL MEDICINE

## 2023-04-13 NOTE — PROGRESS NOTES
[facility] Cardiology  OFFICE NOTE    Cardiovascular Medicine  Cierra Franklin M.D., [facility], FSCAI, RPVI         No referring provider defined for this encounter.    Thank you for asking me to see Conrado Cazares for CAD.    History of Present Illness  This is a 72 y.o. male with:    1.  Coronary artery disease  2.  Hypertension  3.  Hyperlipidemia  4.  CKD  5.  COPD  6.  Pulmonary hypertension.    Conrado Cazares is a 72 y.o. male who presents for consultation today.  Patient has multiple comorbidities including coronary artery disease status post CABG, congestive heart failure preserved ejection fraction, CKD.  His course has been complicated by prostate enlargement causing urinary obstruction and renal failure.  He has a Benjamin in place.  Is being planned for potential UroLift.  Has been doing well from cardiac standpoint.  Denying any chest pain.      04/13/2023:  No acute issues since last visit.  Denying any chest pain or shortness of breath    [facility]: 2/4/ 2020  Graft angiogram:  1.  LIMA to LAD is small in caliber but widely patent.  There is competitive flow from the LAD.  2.  SVG is attached to the RPDA and is retrogradely filling the RPL V system.  The graft is widely patent, target vessel is a medium caliber vessel which is widely patent as well.     Conclusion:  1.  Two-vessel obstructive coronary artery disease involving the LAD and chronic total occlusion of the RCA.  2.  2 out of 2 grafts are patent. LIMA to LAD is small in caliber but patent.  SVG to RCA is widely patent.  3.  Moderately elevated left-sided filling pressures     Complications:  None  EBL: 5ml  Specimen: None      Recommmendations:   1.  Optimize medical therapy for angina  2.  Consider diuresis in the setting of elevated left-sided filling pressures and optimal systemic hypertension control.  3.  IV fluids per protocol for prevention of JERRELL.    Review of Systems - ROS  Constitution: Negative for weakness, weight  gain and weight loss.   HENT: Negative for congestion.    Eyes: Negative for blurred vision.   Cardiovascular: As mentioned above  Respiratory: Negative for cough and hemoptysis.    Endocrine: Negative for polydipsia and polyuria.   Hematologic/Lymphatic: Negative for bleeding problem. Does not bruise/bleed easily.   Skin: Negative for flushing.   Musculoskeletal: Negative for neck pain and stiffness.   Gastrointestinal: Negative for abdominal pain, diarrhea, jaundice, melena, nausea and vomiting.   Genitourinary: Negative for dysuria and hematuria.   Neurological: Negative for dizziness, focal weakness and numbness.   Psychiatric/Behavioral: Negative for altered mental status and depression.     I reviewed the ROS as documented here and confirmed the accuracy of it with the patient today. 4/13/2023        All other systems were reviewed and were negative.    family history includes Heart disease in an other family member.     reports that he quit smoking about 9 years ago. His smoking use included cigarettes. He started smoking about 59 years ago. He has a 100.00 pack-year smoking history. He has never used smokeless tobacco. He reports that he does not drink alcohol and does not use drugs.    Allergies   Allergen Reactions   • Sulfamethoxazole-Trimethoprim Other (See Comments)     Decreased Kidney Function. Pat unable to tolerate.      • Dopamine Dizziness     Dropped BP          Current Outpatient Medications:   •  albuterol sulfate  (90 Base) MCG/ACT inhaler, Inhale 2 puffs Every 4 (Four) Hours As Needed for Wheezing., Disp: 18 g, Rfl: 5  •  aspirin 81 MG chewable tablet, Chew 1 tablet Daily., Disp: , Rfl:   •  atorvastatin (LIPITOR) 20 MG tablet, Take 1 tablet by mouth Every Night., Disp: 30 tablet, Rfl: 11  •  bumetanide (BUMEX) 1 MG tablet, Take 1 tablet by mouth Daily., Disp: , Rfl:   •  carvedilol (COREG) 25 MG tablet, Take 1 tablet by mouth 2 (Two) Times a Day With Meals., Disp: , Rfl:   •   "Cholecalciferol (VITAMIN D3) 1000 units capsule, Take 1 capsule by mouth Daily., Disp: , Rfl:   •  dutasteride (AVODART) 0.5 MG capsule, Take 1 capsule by mouth Daily., Disp: , Rfl:   •  escitalopram (LEXAPRO) 20 MG tablet, Take 10 mg by mouth Daily. 3 tablets by mouth daily, Disp: , Rfl:   •  gabapentin (NEURONTIN) 100 MG capsule, Take 1 capsule by mouth 3 (Three) Times a Day. 1 am 2 pm, Disp: , Rfl:   •  hydrALAZINE (APRESOLINE) 50 MG tablet, Take 1 tablet by mouth 3 (Three) Times a Day., Disp: , Rfl:   •  isosorbide mononitrate (IMDUR) 30 MG 24 hr tablet, TAKE 1 TABLET BY MOUTH ONCE DAILY IN THE MORNING, Disp: 90 tablet, Rfl: 3  •  metFORMIN (GLUCOPHAGE) 500 MG tablet, Take 1 tablet by mouth Daily., Disp: , Rfl:   •  nitroglycerin (NITROSTAT) 0.4 MG SL tablet, Place 1 tablet under the tongue Every 5 (Five) Minutes As Needed for Chest Pain. Take no more than 3 doses in 15 minutes., Disp: , Rfl:   •  omega-3 acid ethyl esters (LOVAZA) 1 g capsule, Take 2 capsules by mouth 2 (Two) Times a Day., Disp: , Rfl:   •  pantoprazole (PROTONIX) 40 MG EC tablet, Take 1 tablet by mouth Daily., Disp: , Rfl:   •  rOPINIRole (REQUIP) 1 MG tablet, Take 1 tablet by mouth Every Night. Take 1 hour before bedtime., Disp: , Rfl:   •  tamsulosin (FLOMAX) 0.4 MG capsule 24 hr capsule, Take 1 capsule by mouth Daily., Disp: , Rfl:   •  primidone (MYSOLINE) 250 MG tablet, Take 1 tablet by mouth 4 (Four) Times a Day., Disp: , Rfl:     Physical Exam:  Vitals:    04/13/23 0758   BP: 170/80   BP Location: Left arm   Patient Position: Sitting   Cuff Size: Adult   Pulse: 60   SpO2: 99%   Weight: 91.1 kg (200 lb 12.8 oz)   Height: 180.3 cm (71\")   PainSc: 0-No pain   PainLoc: Chest     Current Pain Level: none  Pulse Ox: Normal  on room air  General: alert, appears stated age and cooperative     Body Habitus: well-nourished    HEENT: Head: Normocephalic, no lesions, without obvious abnormality. No arcus senilis, xanthelasma or xanthomas.    Neuro: " alert, oriented x3  Pulses: 2+ and symmetric  JVP: Volume/Pulsation: Normal.  Normal waveforms.   Appropriate inspiratory decrease.  No Kussmaul's. No Janis's.   Carotid Exam: no bruit normal pulsation bilaterally   Carotid Volume: normal.     Respirations: no increased work of breathing   Chest:  Normal    Pulmonary:Normal   Precordium: Normal impulses. P2 is not palpable.  RV Heave: absent  LV Heave: absent  [place]:  normal size and placement  Palpable S4: absent.  Heart rate: normal    Heart Rhythm: regular     Heart Sounds: S1: normal  S2: normal  S3: absent   S4: absent  Opening Snap: absent    Pericardial Rub:  Absent: .    Abdomen:   Appearance: normal .  Palpation: Soft, non-tender to palpation, bowel sounds positive in all four quadrants; no guarding or rebound tenderness  Extremity: Chronic venous static changes with the lower extremity swelling.    I have reexamined the patient and the results are consistent with the previously documented exam. Cierra Franklin MD     DATA REVIEWED:     EKG. I personally reviewed and interpreted the EKG.      ECG/EMG Results (all)     None        ---------------------------------------------------  TTE/BRIANNA:  Results for orders placed in visit on 10/19/22    Adult Transthoracic Echo Complete w/ Color, Spectral and Contrast if Necessary Per Protocol    Interpretation Summary  •  Left ventricular ejection fraction appears to be 56 - 60%. Left ventricular systolic function is normal.  •  Left ventricular wall thickness is consistent with moderate concentric hypertrophy.  •  Left ventricular diastolic function is consistent with (grade II w/high LAP) pseudonormalization.  •  Left atrial volume is mildly increased.  •  Estimated right ventricular systolic pressure from tricuspid regurgitation is mildly elevated (35-45 mmHg).    -----------------------------------------------------  CT:   No radiology results for the last 30  days.      --------------------------------------------------------------------------------------------------  LABS:     The CVD Risk score (Tali et al., 2008) failed to calculate for the following reasons:    The patient has a prior MI, stroke, CHF, or peripheral vascular disease diagnosis         Lab Results   Component Value Date    GLUCOSE 151 (H) 02/04/2020    BUN 20 02/04/2020    CREATININE 1.42 (H) 02/04/2020    EGFRIFNONA 50 (L) 02/04/2020    BCR 14.1 02/04/2020    K 3.7 08/17/2022    CO2 27.0 02/04/2020    CALCIUM 9.8 02/04/2020     Lab Results   Component Value Date    WBC 5.8 01/11/2023    HGB 11.6 (L) 01/11/2023    HCT 34.4 (L) 01/11/2023    MCV 93.2 01/11/2023     01/11/2023     No results found for: CHOL, CHLPL, TRIG, HDL, LDL, LDLDIRECT  No results found for: TSH, C6JYMRD, Z0BDCTL, THYROIDAB  Lab Results   Component Value Date    TROPONINT 50 (H) 08/16/2022     Lab Results   Component Value Date    HGBA1C 5.8 03/22/2023     No results found for: DDIMER  No results found for: ALT  Lab Results   Component Value Date    HGBA1C 5.8 03/22/2023    HGBA1C 6.3 07/19/2022    HGBA1C 5.9 09/09/2021     Lab Results   Component Value Date    CREATININE 1.42 (H) 02/04/2020     No results found for: IRON, TIBC, FERRITIN  Lab Results   Component Value Date    INR 0.97 02/04/2020    PROTIME 12.7 02/04/2020       [unfilled]    1.  Coronary artery disease:  Status post CABG.  Last cardiac cath was in February 2020.  Showed patent grafts.  He had moderately elevated left-sided filling pressures at that time.  On aspirin/Coreg/Imdur/statin    2.  Congestive heart failure preserved ejection fraction:  Preserved ejection fraction.  Stage II diastolic dysfunction.  Complicated with chronic kidney disease.    He has chronic lower extremity swelling with venous static changes.  Has been following with vascular.  He has Unna boots and compression stockings and venous pumps available at home.  He appears  euvolemic and I would recommend continuing the current dosages of his diuretics.  Will need updated lab work.    3. HTN:  In the setting of CKD. On coreg 25mg, hydralazine, Imdur.  Blood pressures was elevated on arrival.  Has not taken his home medications this morning.      Prevention:  BMI is >= 25 and <30. (Overweight) The following options were offered after discussion;: exercise counseling/recommendations      Conrado Cazares  reports that he quit smoking about 9 years ago. His smoking use included cigarettes. He started smoking about 59 years ago. He has a 100.00 pack-year smoking history. He has never used smokeless tobacco.. I have educated him on the risk of diseases from using tobacco products such as cancer, COPD and heart disease.     I advised him to quit and he is not willing to quit.    I spent 3  minutes counseling the patient.            This document has been electronically signed by Cierra Franklin MD on April 13, 2023 08:06 CDT

## 2023-08-29 ENCOUNTER — OFFICE VISIT (OUTPATIENT)
Dept: CARDIOLOGY | Facility: CLINIC | Age: 73
End: 2023-08-29
Payer: MEDICARE

## 2023-08-29 VITALS
SYSTOLIC BLOOD PRESSURE: 148 MMHG | OXYGEN SATURATION: 98 % | HEART RATE: 53 BPM | DIASTOLIC BLOOD PRESSURE: 80 MMHG | HEIGHT: 71 IN | WEIGHT: 192.8 LBS | BODY MASS INDEX: 26.99 KG/M2

## 2023-08-29 DIAGNOSIS — I10 HYPERTENSION, ESSENTIAL: ICD-10-CM

## 2023-08-29 DIAGNOSIS — I50.32 CHRONIC HEART FAILURE WITH PRESERVED EJECTION FRACTION (HFPEF): ICD-10-CM

## 2023-08-29 DIAGNOSIS — I27.20 PULMONARY HYPERTENSION: Primary | ICD-10-CM

## 2023-08-29 LAB
QT INTERVAL: 516 MS
QTC INTERVAL: 484 MS

## 2023-08-29 RX ORDER — ISOSORBIDE MONONITRATE 30 MG/1
30 TABLET, EXTENDED RELEASE ORAL EVERY MORNING
Qty: 90 TABLET | Refills: 3 | Status: SHIPPED | OUTPATIENT
Start: 2023-08-29

## 2023-08-29 NOTE — PROGRESS NOTES
Pulmonary hypertension    History of Present Illness  Mr. Conrado Cazares is a 72-year-old male with medical history of pulmonary hypertension, hypertension, hyperlipidemia, PVD, COPD stage III, CKD, HALIMA coronary artery disease with CABG.  He also has nonrheumatic mitral valve regurgitation, tricuspid valve regurgitation and pulmonary valve insufficiency.    He was recently admitted to [facility] in [place] with urosepsis, acute kidney injury, non-STEMI type II demand ischemia due to hypertension and sepsis.  Found to have Klebsiella in urine and developed hypotension.  An echocardiogram was performed there and showed preserved LV EF but pulmonary hypertension.  Last labs showed creatinine 1.9, EGFR 35.    8/29/2022 He presents today for hospital follow-up.  He complains of shortness of breath and bilateral lower leg swelling.  This is gotten worse over the last couple of days.  He has good urinary output and he reports that it is pale yellow in he has increased frequency since Bumex has increased to 1 mg twice daily.  This was started on Friday.  His weight at her office was 206 and today 207.  He has known pulmonary hypertension.  Blood pressure slightly elevated today.    9/14/22, he presents for 2-week follow-up.  He went to see his PCP who gave him 80 mg of IV Lasix (8/31/2022)(9/1/2022).  He was admitted to [facility] on 9/5/2022 with bladder obstruction and acute kidney injury.  And found to have BPH.  He presents with Benjamin catheter to drainage bag.  Son is with him today he also developed an IRIS with creatinine at 2.9 but is now at 1.8.  He is seeing nephrologist and urologist due to having bladder and prostate obstruction.    We treated his lower leg cellulitis a few weeks ago with Bactrim.  This evidently caused some worsening of renal function.  However,  they did failed to mention the amount of IV Lasix that was given by PCP prior to admission for urinary retention and IRIS.    His lower leg edema  "has improved some but still has some redness left lower leg.  Son reports that it is better.   He does have home health coming to see him.  He has leg pumps at home that has not been used. Has leg pumps but can't leave them on.    10/27/22, he presents for follow-up regarding heart failure with preserved EF.  He has seen Dr. Deras nephrology for his stage III CKD.  He is taking Bumex 1 mg daily, lower leg edema slightly improved from last visit.  He has leg pumps at home but has trouble getting them on and off, compliance issue.  Weight stable.  Appetite not good.    2/2023, he presents for follow-up regarding heart failure with preserved EF.  He tells me that he is \"feeling pretty good.\"  Denying orthopnea, PND, CIFUENTES, or worsening leg swelling.  Does have fatigue but still able to do his ADLs.  Weight is stable.  We discussed medication list and he is not taking Eliquis due to having suprapubic catheter and developing bleeding.  We discussed his risk for developing a pulmonary embolus.  He was placed on this for DVT.  He tells me that he is aware and is not taking it.    In between office visits with me, he has seen Dr. Franklin on 4/13/23. No changes to medications.    Today presents for HFpEF.  Doing well. No complaints today. No change in sleeping habits.  Tells me has been sleeping in recliner for 8 years.  Appetite is good.  He is working as a  and walking 1 mile a day.  On weekends he can work up to 3 miles a day.  Denying shortness of breath, dyspnea on exertion, orthopnea or PND.  Lower leg stockings on.    Reporting good diet intake.  Breakfast consist of banana, avocado and 2 boiled eggs.  Likes salads, strawberries, and grapes.    The ASCVD Risk score (Morena DK, et al., 2019) failed to calculate for the following reasons:    The valid total cholesterol range is 130 to 320 mg/dL    Cardiac Risk Factors:  diabetes mellitus, hypercholesterolemia, hypertension, Sedentary life style and Obesity, " former smoker  Previous PCI and History of Heart Failure    Past Medical History:   Diagnosis Date    Anxiety     Class 1 obesity due to excess calories with serious comorbidity and body mass index (BMI) of 31.0 to 31.9 in adult 2019    COPD (chronic obstructive pulmonary disease)     Dehydration     Depression     Dyslipidemia     Emphysema of lung     Essential (primary) hypertension     Essential hypertension     GERD (gastroesophageal reflux disease)     Heat exhaustion     Hyperlipidemia     Neuropathy     bilateral legs     Nicotine dependence      other tobacco product, uncomplicated - vapor cig       Other atherosclerosis of native arteries of extremities, bilateral legs     with intermittent claudication    Renal failure     stage 3    Sleep apnea     Tobacco dependence syndrome      Past Surgical History:   Procedure Laterality Date    CARDIAC CATHETERIZATION      CARDIAC CATHETERIZATION N/A 2020    Procedure: Left Heart Cath/PCI if indicated;  Surgeon: Cierra Franklin MD;  Location: [facility] CATH INVASIVE LOCATION;  Service: Cardiology;  Laterality: N/A;    CORONARY ARTERY BYPASS GRAFT      GALLBLADDER SURGERY      PROSTATE SURGERY       Social History     Socioeconomic History    Marital status:    Tobacco Use    Smoking status: Former     Packs/day: 2.00     Years: 50.00     Pack years: 100.00     Types: Cigarettes     Start date:      Quit date: 3/29/2014     Years since quittin.4    Smokeless tobacco: Never    Tobacco comments:     Will use e-cig now.    Vaping Use    Vaping Use: Every day    Start date: 2014    Substances: Nicotine    Devices: Refillable tank   Substance and Sexual Activity    Alcohol use: No    Drug use: No    Sexual activity: Defer     Family History   Problem Relation Age of Onset    Heart disease Other        ALLERGIES:  Allergies   Allergen Reactions    Sulfamethoxazole-Trimethoprim Other (See Comments)     Decreased Kidney Function. Pat unable to  tolerate.       Dopamine Dizziness     Dropped BP        Review of Systems   Constitutional: Positive for malaise/fatigue (Same). Negative for decreased appetite, fever and night sweats.   HENT: Negative.     Cardiovascular:  Positive for leg swelling (Improved). Negative for chest pain, dyspnea on exertion, near-syncope, orthopnea, palpitations and paroxysmal nocturnal dyspnea.   Respiratory:  Negative for cough and shortness of breath.         Sleep study in the future per his report   Hematologic/Lymphatic: Negative for bleeding problem. Does not bruise/bleed easily.   Skin: Negative.    Musculoskeletal:  Negative for muscle cramps, muscle weakness and myalgias.   Gastrointestinal:  Negative for nausea and vomiting.   Genitourinary:         Has Benjamin to drainage bag due to bladder obstruction/prostate enlargement    Neurological:  Negative for headaches, light-headedness, loss of balance and weakness.     Current Outpatient Medications   Medication Sig Dispense Refill    albuterol sulfate  (90 Base) MCG/ACT inhaler Inhale 2 puffs Every 4 (Four) Hours As Needed for Wheezing. 18 g 5    aspirin 81 MG chewable tablet Chew 1 tablet Daily.      atorvastatin (LIPITOR) 20 MG tablet Take 1 tablet by mouth Every Night. 30 tablet 11    bumetanide (BUMEX) 1 MG tablet Take 1 tablet by mouth Daily.      carvedilol (COREG) 25 MG tablet Take 1 tablet by mouth 2 (Two) Times a Day With Meals.      Cholecalciferol (VITAMIN D3) 1000 units capsule Take 1 capsule by mouth Daily.      dutasteride (AVODART) 0.5 MG capsule Take 1 capsule by mouth Daily.      escitalopram (LEXAPRO) 20 MG tablet Take 0.5 tablets by mouth Daily. 3 tablets by mouth daily      gabapentin (NEURONTIN) 100 MG capsule Take 1 capsule by mouth 3 (Three) Times a Day. 1 am 2 pm      hydrALAZINE (APRESOLINE) 50 MG tablet Take 1 tablet by mouth 3 (Three) Times a Day.      isosorbide mononitrate (IMDUR) 30 MG 24 hr tablet Take 1 tablet by mouth Every Morning. 90  tablet 3    nitroglycerin (NITROSTAT) 0.4 MG SL tablet Place 1 tablet under the tongue Every 5 (Five) Minutes As Needed for Chest Pain. Take no more than 3 doses in 15 minutes.      omega-3 acid ethyl esters (LOVAZA) 1 g capsule Take 2 capsules by mouth 2 (Two) Times a Day.      pantoprazole (PROTONIX) 40 MG EC tablet Take 1 tablet by mouth Daily.      primidone (MYSOLINE) 250 MG tablet Take 1 tablet by mouth 4 (Four) Times a Day.      rOPINIRole (REQUIP) 1 MG tablet Take 1 tablet by mouth Every Night. Take 1 hour before bedtime.      tamsulosin (FLOMAX) 0.4 MG capsule 24 hr capsule Take 1 capsule by mouth Daily.      metFORMIN (GLUCOPHAGE) 500 MG tablet Take 1 tablet by mouth Daily.       No current facility-administered medications for this visit.       OBJECTIVE:    Physical Exam:   Vitals reviewed.   Constitutional:       Appearance: Well-developed, well-groomed, overweight and not in distress.   Eyes:      General: Lids are normal.      Conjunctiva/sclera: Conjunctivae normal.   HENT:      Head: Normocephalic.    Mouth/Throat:      Lips: Pink.      Mouth: Mucous membranes are moist.   Neck:      Vascular: No JVD. JVD normal.      Trachea: Trachea normal.   Pulmonary:      Effort: Pulmonary effort is normal.      Breath sounds: Normal breath sounds and air entry. No decreased breath sounds.   Cardiovascular:      Normal rate. Regular rhythm. Normal S1 with normal intensity. Normal S2 with normal intensity.       Murmurs: There is no murmur.      No gallop.    Edema:     Ankle: bilateral trace edema of the ankle.     Feet: bilateral trace edema of the feet.  Skin:     General: Skin is warm and dry.      Capillary Refill: Capillary refill takes less than 2 seconds.      Coloration: Skin is not ashen, cyanotic or pale.   Neurological:      Mental Status: Alert, oriented to person, place, and time and oriented to person, place and time.      Gait: Gait is intact.   Psychiatric:         Attention and Perception:  "Attention normal.         Mood and Affect: Mood normal.         Speech: Speech normal.         Thought Content: Thought content normal.     Vitals:    08/29/23 0853   BP: 148/80   BP Location: Left arm   Patient Position: Sitting   Cuff Size: Adult   Pulse: 53   SpO2: 98%   Weight: 87.5 kg (192 lb 12.8 oz)   Height: 180.3 cm (71\")       DATA REVIEWED:   Results for orders placed in visit on 10/19/22    Adult Transthoracic Echo Complete w/ Color, Spectral and Contrast if Necessary Per Protocol    Interpretation Summary    Left ventricular ejection fraction appears to be 56 - 60%. Left ventricular systolic function is normal.    Left ventricular wall thickness is consistent with moderate concentric hypertrophy.    Left ventricular diastolic function is consistent with (grade II w/high LAP) pseudonormalization.    Left atrial volume is mildly increased.    Estimated right ventricular systolic pressure from tricuspid regurgitation is mildly elevated (35-45 mmHg).      No radiology results for the last 30 days.  CXR:     CT:     Labs: BMP, CBC, LIPID, TSH  Lab Results   Component Value Date    GLUCOSE 151 (H) 02/04/2020    CALCIUM 9.8 02/04/2020     02/04/2020    K 3.7 08/17/2022    CO2 27.0 02/04/2020     02/04/2020    BUN 20 02/04/2020    CREATININE 1.42 (H) 02/04/2020    EGFRIFNONA 50 (L) 02/04/2020    BCR 14.1 02/04/2020    ANIONGAP 12.0 02/04/2020     Lab Results   Component Value Date    WBC 14.9 (H) 06/24/2023    HGB 10.2 (L) 06/24/2023    HCT 30.3 (L) 06/24/2023    MCV 94.7 06/24/2023     06/24/2023     No results found for: CHOL  Lab Results   Component Value Date    TRIG 94 06/24/2023     Lab Results   Component Value Date    HDL 34 (L) 06/24/2023     No components found for: LDLCALC  Lab Results   Component Value Date    LDL 58 06/24/2023     No results found for: HDLLDLRATIO  No components found for: CHOLHDL  No results found for: TSH  No results found for: PROBNP  EKG:       [facility]: 2/4/ " 2020  Graft angiogram:  1.  LIMA to LAD is small in caliber but widely patent.  There is competitive flow from the LAD.  2.  SVG is attached to the RPDA and is retrogradely filling the RPL V system.  The graft is widely patent, target vessel is a medium caliber vessel which is widely patent as well.     Conclusion:  1.  Two-vessel obstructive coronary artery disease involving the LAD and chronic total occlusion of the RCA.  2.  2 out of 2 grafts are patent. LIMA to LAD is small in caliber but patent.  SVG to RCA is widely patent.  3.  Moderately elevated left-sided filling pressures     Complications:  None  EBL: 5ml  Specimen: None      Recommmendations:   1.  Optimize medical therapy for angina  2.  Consider diuresis in the setting of elevated left-sided filling pressures and optimal systemic hypertension control.  3.  IV fluids per protocol for prevention of JERRELL.          The following portions of the patient's history were reviewed and updated as appropriate: allergies, current medications, past family history, past medical history, past social history, past surgical history and problem list.  Old records reviewed and pertinent information is included in the above objective data.     ASSESSMENT/PLAN:     Diagnosis Plan   1. Pulmonary hypertension  ECG 12 Lead      2. Chronic heart failure with preserved ejection fraction (HFpEF)        3. Hypertension, essential  ECG 12 Lead    isosorbide mononitrate (IMDUR) 30 MG 24 hr tablet        1.  Pulmonary hypertension.  Echocardiogram in 10/2022 showing RVSP 38.   Denying shortness of breath, dyspnea on exertion, PND, or orthopnea.  Lower leg swelling unchanged.     Continue Bumex 1 mg daily   Continue antihypertensive medications carvedilol and hydralazine    2.  HFpEF.  Most recent echocardiogram showing preserved LVEF 56 to 60%, grade 2 diastolic dysfunction. Lower leg edema is now trace. He is seeing nephrology and urologist.      Following Dr. Deras nephrology.   July 2023 labs showing creatinine 1.4.  BUN 20, sodium 138, potassium 4.6.    EKG today showing sinus bradycardia at 53 bpm.  No ST or T wave abnormalities.    Looks the best that I have seen him today.  Weight is good.  He tells me that he is working as a  and working at least 4 days a week.  Walking 1 to 3 miles while on the job.    ICM: EF:56-60*%. NYHA Class II, Stage C. Patient is currently  euvolemic.  And in a well perfused physiologic state. Hemodynamics are acceptable     BETABLOCKER: Carvedilol  25 mg twice daily  ACE/ARB: IRIS  ENTRESTO: not indicated  DIURETIC: Bumex 1mg in am   ALDOSTERONE ANTAGONIST: N/A  IMDUR/HYDRALAZINE: Hydralazine 50 mg three daily /Imdur 30 mg daily, refilled  DIGOXIN: N/A    Fluid restriction: 2 L  Sodium restriction:2 grams    6MWT: Defer  Cardiac Rehab: no  ICD: not indicated   ADHF: no  LVAD: not indicated     3.  Hypertension, essential.  Normotensive today.    Continue Imdur, carvedilol, and hydralazine    I spent 30 minutes caring for Conrado on this date of service. This time includes time spent by me in the following activities: reviewing tests, obtaining and/or reviewing a separately obtained history, performing a medically appropriate examination and/or evaluation, ordering medications, tests, or procedures and documenting information in the medical record   Return in about 6 months (around 2/29/2024) for Recheck.        This document has been electronically signed by LAURA Becerra on August 29, 2023 09:37 CDT   Electronically signed by LAURA Becerra, 08/29/23, 9:37 AM CDT.

## 2023-09-13 ENCOUNTER — OFFICE VISIT (OUTPATIENT)
Dept: CARDIAC SURGERY | Facility: CLINIC | Age: 73
End: 2023-09-13
Payer: MEDICARE

## 2023-09-13 VITALS
DIASTOLIC BLOOD PRESSURE: 80 MMHG | SYSTOLIC BLOOD PRESSURE: 146 MMHG | HEART RATE: 55 BPM | WEIGHT: 193 LBS | OXYGEN SATURATION: 99 % | HEIGHT: 71 IN | BODY MASS INDEX: 27.02 KG/M2

## 2023-09-13 DIAGNOSIS — I65.23 CAROTID STENOSIS, ASYMPTOMATIC, BILATERAL: Primary | ICD-10-CM

## 2023-09-13 DIAGNOSIS — E78.2 MIXED HYPERLIPIDEMIA: ICD-10-CM

## 2023-09-13 DIAGNOSIS — I73.9 PVD (PERIPHERAL VASCULAR DISEASE): ICD-10-CM

## 2023-09-13 LAB
QT INTERVAL: 440 MS
QTC INTERVAL: 414 MS

## 2023-09-13 PROCEDURE — 3079F DIAST BP 80-89 MM HG: CPT | Performed by: NURSE PRACTITIONER

## 2023-09-13 PROCEDURE — 1159F MED LIST DOCD IN RCRD: CPT | Performed by: NURSE PRACTITIONER

## 2023-09-13 PROCEDURE — 3077F SYST BP >= 140 MM HG: CPT | Performed by: NURSE PRACTITIONER

## 2023-09-13 PROCEDURE — 1160F RVW MEDS BY RX/DR IN RCRD: CPT | Performed by: NURSE PRACTITIONER

## 2023-09-13 PROCEDURE — 99214 OFFICE O/P EST MOD 30 MIN: CPT | Performed by: NURSE PRACTITIONER

## 2023-09-13 NOTE — PATIENT INSTRUCTIONS
The results of your vascular studies of your right leg shows mild disease with good  circulation, in the left leg shows moderatedisease with fair  circulation.      If signs and symptoms of ischemia should occur including but not limited to pale/blue discoloration of limb, increasing pain with ambulation or at rest, or a non-healing wound. Patient is to notify Heart and Vascular center for immediate evaluation.    The results of your carotid ultrasound shows moderate disease of the right carotid and is stable from previous exam, ultrasound of the left carotid shows mild disease and is stable from previous exam.    Follow up in 12 months    If you should experience any neurological symptoms including but not limited to visual or speech disturbances confusion, seizures, or weakness of limbs of one side of your body notify Heart and Vascular center immediately for evaluation or if after hours present to the nearest Emergency Department.

## 2023-09-14 NOTE — PROGRESS NOTES
CVTS Office Progress Note     9/14/2023    Conrado Cazares  1950    Chief Complaint:    Chief Complaint   Patient presents with    Peripheral Vascular Disease    Carotid Artery Disease       HPI:      PCP:  Deepa Servin APRN  Cardiology:  Dr. Franklin  Nephrology:  Dr. Deras    73 y.o. male with HTN(stable, increased risk stroke, rupture), Hyperlipidemia(stable, increased risk cardiovascular events), Obesity(uncontrolled, increased risk cardiovascular events), Smoker(uncontrolled, increased risk cardiovascular events), COPD(stable, increased risk pulmonary complications) and Chronic Kidney Disease(stable, increased risk renal failure) CAD (prior CABG), PVD (chronic, asymptomatic), Carotid stenosis (new, increased risk of CVA).  former smoker and now uses e-cigarette .  Established with [facility] 2014 for claudication, repeat studies post medical management improved, no prior intervention, no current claudication.  Diagnosed with DVT refused anticoagulation, spontaneous resolution.  Lost wife last year.  Returns today in follow-up carotid ultrasound and ASHWIN    [place] [facility]     06/2014 ASHWIN: 0.66 tri/bi of the RIGHT, and 0.56 tri/bi of the LEFT  12/21/2015 ASHWIN:  RIGHT .80 triphasic.  LEFT .88 triphasic.  6/27/2016 ASHWIN:  RIGHT .88 triphasic.  LEFT .79 triphasic.  01/03/17   ASHWIN:  RIGHT .82  triphasic.  LEFT .70  Triphasic.  07/06/17  ASHWIN:  RIGHT .86  triphasic.  LEFT .70  Triphasic.  01/18/18   ASHWIN:  RIGHT 1.01  Triphasic.  LEFT .88  Triphasic.  07/19/18   ASHWIN:  RIGHT 0.96  Triphasic.  LEFT .79  Triphasic.  01/24/19 ASHWIN: RIGHT 1.08 Triphasic. LEFT 0.78 Biphasic from femoral to distal  8/1/2019 ASHWIN: Right 0.91 triphasic.  Left 0.80 triphasic.  8/2020 ASHWIN: Right 0.79 triphasic.  Left 0.70 triphasic pop, biphasic distally.   3/2021 ASHWIN: Right 0.91 triphasic.  Left 0.86 triphasic.  5/2022 ASHWIN: Right 0.75 triphasic.  Left 0.69 triphasic pop, biphasic distally.   9/2023 ASHWIN: Right 0.94  triphasic.  Left 0.83 triphasic    8/2020 Carotid Duplex: DIAZ 50-69% mPSV 176c/s Ratio 2.2, LICA 0-49% mPSV 87c/s Ratio 1.5, Antegrade vertebrals  3/2021 Carotid Duplex: DIAZ 50-69% mPSV 176c/s Ratio 1.2, LICA 50-69% mPSV 145c/s Ratio 2.5, Antegrade vertebrals  9/2021 Carotid Duplex: DIAZ 50-69% mPSV 145c/s Ratio 2.7, LICA 50-69% mPSV 150c/s Ratio 2.1, Antegrade vertebrals  5/2022 Carotid Duplex: DIAZ 50-69% mPSV 136c/s Ratio 1.8, LICA 50-69% mPSV 136c/s Ratio 2.6, Antegrade vertebrals  9/2023 Carotid Duplex: DIAZ 50-69% mPSV 160c/s mEDV 22c/s Ratio 1.2, LICA 0-49% mPSV 103c/s mEDV 23c/s Ratio 1.4, Antegrade vertebrals      11/2022 bilateral venous: Appears nonocclusive thrombosis right superficial femoral junction proximal GSV.  Pulsatile flow left GSV absent harvested for bypass  3/2023 Left venous: Negative for DVT    The following portions of the patient's history were reviewed and updated as appropriate: allergies, current medications, past family history, past medical history, past social history, past surgical history and problem list.  Recent images independently reviewed.  Available laboratory values reviewed.    PMH:  Past Medical History:   Diagnosis Date    Anxiety     Class 1 obesity due to excess calories with serious comorbidity and body mass index (BMI) of 31.0 to 31.9 in adult 9/6/2019    COPD (chronic obstructive pulmonary disease)     Dehydration     Depression     Dyslipidemia     Emphysema of lung     Essential (primary) hypertension     Essential hypertension     GERD (gastroesophageal reflux disease)     Heat exhaustion     Hyperlipidemia     Neuropathy     bilateral legs     Nicotine dependence      other tobacco product, uncomplicated - vapor cig       Other atherosclerosis of native arteries of extremities, bilateral legs     with intermittent claudication    Renal failure     stage 3    Sleep apnea     Tobacco dependence syndrome      Past Surgical History:   Procedure Laterality Date     CARDIAC CATHETERIZATION      CARDIAC CATHETERIZATION N/A 2020    Procedure: Left Heart Cath/PCI if indicated;  Surgeon: Cierra Franklin MD;  Location: [facility] CATH INVASIVE LOCATION;  Service: Cardiology;  Laterality: N/A;    CORONARY ARTERY BYPASS GRAFT      GALLBLADDER SURGERY      PROSTATE SURGERY       Family History   Problem Relation Age of Onset    Heart disease Other      Social History     Tobacco Use    Smoking status: Former     Packs/day: 2.00     Years: 50.00     Pack years: 100.00     Types: Cigarettes     Start date:      Quit date: 3/29/2014     Years since quittin.4    Smokeless tobacco: Never    Tobacco comments:     Will use e-cig now.    Vaping Use    Vaping Use: Every day    Start date: 2014    Substances: Nicotine    Devices: [employer] tank   Substance Use Topics    Alcohol use: No    Drug use: No       ALLERGIES:  Allergies   Allergen Reactions    Sulfamethoxazole-Trimethoprim Other (See Comments)     Decreased Kidney Function. Pat unable to tolerate.       Dopamine Dizziness     Dropped BP          MEDICATIONS:    Current Outpatient Medications:     albuterol sulfate  (90 Base) MCG/ACT inhaler, Inhale 2 puffs Every 4 (Four) Hours As Needed for Wheezing., Disp: 18 g, Rfl: 5    aspirin 81 MG chewable tablet, Chew 1 tablet Daily., Disp: , Rfl:     atorvastatin (LIPITOR) 20 MG tablet, Take 1 tablet by mouth Every Night., Disp: 30 tablet, Rfl: 11    bumetanide (BUMEX) 1 MG tablet, Take 1 tablet by mouth Daily., Disp: , Rfl:     carvedilol (COREG) 25 MG tablet, Take 1 tablet by mouth 2 (Two) Times a Day With Meals., Disp: , Rfl:     Cholecalciferol (VITAMIN D3) 1000 units capsule, Take 1 capsule by mouth Daily., Disp: , Rfl:     dutasteride (AVODART) 0.5 MG capsule, Take 1 capsule by mouth Daily., Disp: , Rfl:     escitalopram (LEXAPRO) 20 MG tablet, Take 0.5 tablets by mouth Daily. 3 tablets by mouth daily, Disp: , Rfl:     gabapentin (NEURONTIN) 100 MG capsule, Take 1  capsule by mouth 3 (Three) Times a Day. 1 am 2 pm, Disp: , Rfl:     hydrALAZINE (APRESOLINE) 50 MG tablet, Take 1 tablet by mouth 3 (Three) Times a Day., Disp: , Rfl:     isosorbide mononitrate (IMDUR) 30 MG 24 hr tablet, Take 1 tablet by mouth Every Morning., Disp: 90 tablet, Rfl: 3    nitroglycerin (NITROSTAT) 0.4 MG SL tablet, Place 1 tablet under the tongue Every 5 (Five) Minutes As Needed for Chest Pain. Take no more than 3 doses in 15 minutes., Disp: , Rfl:     omega-3 acid ethyl esters (LOVAZA) 1 g capsule, Take 2 capsules by mouth 2 (Two) Times a Day., Disp: , Rfl:     pantoprazole (PROTONIX) 40 MG EC tablet, Take 1 tablet by mouth Daily., Disp: , Rfl:     primidone (MYSOLINE) 250 MG tablet, Take 1 tablet by mouth 4 (Four) Times a Day., Disp: , Rfl:     rOPINIRole (REQUIP) 1 MG tablet, Take 1 tablet by mouth Every Night. Take 1 hour before bedtime., Disp: , Rfl:     tamsulosin (FLOMAX) 0.4 MG capsule 24 hr capsule, Take 1 capsule by mouth Daily., Disp: , Rfl:     metFORMIN (GLUCOPHAGE) 500 MG tablet, Take 1 tablet by mouth Daily., Disp: , Rfl:       Review of Systems   Constitutional: Negative for chills, decreased appetite and fever.   HENT:  Negative for congestion, nosebleeds and sore throat.    Eyes:  Negative for blurred vision, visual disturbance and visual halos.   Cardiovascular:  Negative for chest pain, claudication, dyspnea on exertion and leg swelling.   Respiratory:  Negative for cough, shortness of breath, sputum production and wheezing.    Endocrine: Negative for cold intolerance and polyuria.   Hematologic/Lymphatic: Negative for bleeding problem. Bruises/bleeds easily.        Does not report S/S of adverse bleeding event including nose bleeds, hematuria, melena, gingival bleeding, or hematemesis.   Skin:  Positive for unusual hair distribution. Negative for flushing and nail changes.   Musculoskeletal:  Positive for arthritis, back pain and joint pain.   Gastrointestinal:  Negative for  "bloating, abdominal pain, hematemesis, melena, nausea and vomiting.   Genitourinary:  Negative for flank pain and hematuria.   Neurological:  Negative for brief paralysis, difficulty with concentration, focal weakness, light-headedness, loss of balance, numbness, paresthesias and weakness.        No amaurosis fugax, TIA, or CVA.     Psychiatric/Behavioral:  Negative for altered mental status, depression, substance abuse and suicidal ideas.    Allergic/Immunologic: Negative for hives and persistent infections.       Vitals:    09/13/23 1514   BP: 146/80   BP Location: Left arm   Pulse: 55   SpO2: 99%   Weight: 87.5 kg (193 lb)   Height: 180.3 cm (71\")     Physical Exam   Constitutional: He is oriented to person, place, and time. He appears well-developed.   HENT:   Head: Normocephalic and atraumatic.   Eyes: Pupils are equal, round, and reactive to light. Conjunctivae are normal.   Cardiovascular: Normal rate.   No murmur heard.  Pulmonary/Chest: Effort normal and breath sounds normal. No respiratory distress.   Abdominal: Soft. Bowel sounds are normal. He exhibits no distension.   Musculoskeletal: Normal range of motion. No tenderness.      Right lower leg: No edema.      Left lower leg: No edema.   Neurological: He is alert and oriented to person, place, and time. No cranial nerve deficit.   Skin: Skin is warm and dry. Capillary refill takes 2 to 3 seconds.   There is no evidence of skin breakdown of the bilateral lower extremities.  BLE pink, no evidence of ischemia.  Feet are absent of dependent rubor.   Psychiatric: Judgment normal.   Nursing note and vitals reviewed.    Assessment & Plan     Independent Review of Studies    1. PVD (peripheral vascular disease) with claudication (HCC)  Known mild reduction of bilateral lower extremity perfusion.  Remains on aspirin, statin, nitrates.    No surgical intervention indicated at this time follow-up in 12 months for repeat ASHWIN    Foot care practices to include daily " inspection, bathing in warm never hot water, toenail care, wearing clean dry socks, daily foot moisturize, never walk barefoot    - Doppler Ankle Brachial Index Single Level CAR; Future    2. Carotid stenosis, asymptomatic, bilateral  Moderate RIGHT ICA asymptomatic carotid disease.    Stable velocities    Follow-up with interval imaging repeat carotid duplex in 12 months    - Duplex Carotid Ultrasound CAR; Future    3. Mixed hyperlipidemia  Treatment of hyperlipidemia prevents the progression of peripheral vascular disease and atherosclerosis in multiple beds.  Lipid-lowering therapy may improve claudication distance.  Statins are also considered mandatory in patients with PAD by virtue of reducing cardiovascular events as shown in the heart protection study.  Target LDL for PAD patients is less than 70 mg/dL.  Consideration of PCSK9 inhibitors and/or other adjuvant therapies and statin intolerant patients is recommended.  Continued follow up with PCP is recommended for patients on lipid lowering therapies.    Lab Results   Component Value Date    CHLPL 111 06/24/2023    TRIG 94 06/24/2023    HDL 34 (L) 06/24/2023    LDL 58 06/24/2023               Detailed discussion regarding risks, benefits, and treatment plan. Images independently reviewed. Patient understands, agrees, and wishes to proceed with plan.       This document has been electronically signed by JUANA Belcher-BC Bolivar  On September 14, 2023 12:55 CDT

## (undated) DEVICE — GW PERIPH GUIDERIGHT STD/J/TP PTFE/PCOAT SS 0.038IN 5X150CM

## (undated) DEVICE — KT INTRO MINISTICK MAX W/GW NITNL/TUNG ECHO 4F 21G 7CM

## (undated) DEVICE — CATH DIAG EXPO .056 FL4 6F 100CM

## (undated) DEVICE — MODEL BT2000 P/N 700287-012KIT CONTENTS: MANIFOLD WITH SALINE AND CONTRAST PORTS, SALINE TUBING WITH SPIKE AND HAND SYRINGE, TRANSDUCER: Brand: BT2000 AUTOMATED MANIFOLD KIT

## (undated) DEVICE — INTRO SHEATH ART/FEM ENGAGE .038 6F12CM

## (undated) DEVICE — ELECTRODE,RT,STRESS,FOAM,50PK: Brand: MEDLINE

## (undated) DEVICE — X-DRAPE ABS 12"X17" .25MM LEAD EQUIV STERILE X-RAY SHIELD 10/BOX: Brand: X-DRAPE

## (undated) DEVICE — PK CATH LAB 60

## (undated) DEVICE — CATH DIAG EXPO .052 MPA2 6F 100CM

## (undated) DEVICE — CATH DIAG EXPO .056 FR4 6F 100CM